# Patient Record
Sex: FEMALE | Race: WHITE | NOT HISPANIC OR LATINO | Employment: OTHER | URBAN - METROPOLITAN AREA
[De-identification: names, ages, dates, MRNs, and addresses within clinical notes are randomized per-mention and may not be internally consistent; named-entity substitution may affect disease eponyms.]

---

## 2017-02-06 ENCOUNTER — HOSPITAL ENCOUNTER (INPATIENT)
Facility: HOSPITAL | Age: 82
LOS: 2 days | Discharge: HOME HEALTH | DRG: 965 | End: 2017-02-09
Attending: EMERGENCY MEDICINE | Admitting: SURGERY
Payer: MEDICARE

## 2017-02-06 DIAGNOSIS — S42.034A CLOSED NONDISPLACED FRACTURE OF ACROMIAL END OF RIGHT CLAVICLE, INITIAL ENCOUNTER: ICD-10-CM

## 2017-02-06 DIAGNOSIS — S09.90XA HEAD TRAUMA: ICD-10-CM

## 2017-02-06 DIAGNOSIS — R53.81 DEBILITY: ICD-10-CM

## 2017-02-06 DIAGNOSIS — S09.90XA HEAD TRAUMA, INITIAL ENCOUNTER: ICD-10-CM

## 2017-02-06 DIAGNOSIS — S32.591A INFERIOR PUBIC RAMUS FRACTURE, RIGHT, CLOSED, INITIAL ENCOUNTER: ICD-10-CM
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
## 2017-02-06 DIAGNOSIS — S06.5XAA SUBDURAL HEMATOMA: Primary | ICD-10-CM

## 2017-02-06 DIAGNOSIS — S42.031A: ICD-10-CM

## 2017-02-06 PROCEDURE — 85025 COMPLETE CBC W/AUTO DIFF WBC: CPT

## 2017-02-06 PROCEDURE — 82550 ASSAY OF CK (CPK): CPT

## 2017-02-06 PROCEDURE — 80048 BASIC METABOLIC PNL TOTAL CA: CPT

## 2017-02-06 PROCEDURE — 85610 PROTHROMBIN TIME: CPT

## 2017-02-06 PROCEDURE — 84484 ASSAY OF TROPONIN QUANT: CPT

## 2017-02-06 NOTE — IP AVS SNAPSHOT
[facility]  [address]  [place] [address]  Phone: [phone]           Patient Discharge Instructions     Our goal is to set you up for success. This packet includes information on your condition, medications, and your home care. It will help you to care for yourself so you don't get sicker and need to go back to the hospital.     Please ask your nurse if you have any questions.        There are many details to remember when preparing to leave the hospital. Here is what you will need to do:    1. Take your medicine. If you are prescribed medications, review your Medication List in the following pages. You may have new medications to  at the pharmacy and others that you'll need to stop taking. Review the instructions for how and when to take your medications. Talk with your doctor or nurses if you are unsure of what to do.     2. Go to your follow-up appointments. Specific follow-up information is listed in the following pages. Your may be contacted by a transition nurse or clinical provider about future appointments. Be sure we have all of the phone numbers to reach you, if needed. Please contact your provider's office if you are unable to make an appointment.     3. Watch for warning signs. Your doctor or nurse will give you detailed warning signs to watch for and when to call for assistance. These instructions may also include educational information about your condition. If you experience any of warning signs to your health, call your doctor.               [facility]  Unless otherwise directed by your provider, please contact [facility], our nurse care line that is available for 24/7 assistance.     [phone] (toll-free)    Registered nurses in the [facility] provide clinical advisement, health education, appointment booking, and other advisory services.                    ** Verify the list of medication(s) below is accurate and up  to date. Carry this with you in case of emergency. If your medications have changed, please notify your healthcare provider.             Medication List      START taking these medications        Additional Info                      docusate sodium 50 MG capsule   Commonly known as:  COLACE   Refills:  0   Dose:  50 mg    Instructions:  Take 1 capsule (50 mg total) by mouth 2 (two) times daily.     Begin Date    AM    Noon    PM    Bedtime       tramadol 50 mg tablet   Commonly known as:  ULTRAM   Quantity:  40 tablet   Refills:  0   Dose:  50 mg    Instructions:  Take 1 tablet (50 mg total) by mouth every 6 (six) hours as needed for Pain.     Begin Date    AM    Noon    PM    Bedtime         CONTINUE taking these medications        Additional Info                      alendronate 70 MG tablet   Commonly known as:  FOSAMAX   Refills:  0      Begin Date    AM    Noon    PM    Bedtime       escitalopram oxalate 20 MG tablet   Commonly known as:  LEXAPRO   Quantity:  90 tablet   Refills:  3    Last time this was given:  20 mg on 2/9/2017  9:42 AM   Instructions:  TAKE 1 TABLET ONCE DAILY     Begin Date    AM    Noon    PM    Bedtime       lisinopril 20 MG tablet   Commonly known as:  PRINIVIL,ZESTRIL   Quantity:  90 tablet   Refills:  3    Last time this was given:  20 mg on 2/9/2017  9:42 AM   Instructions:  TAKE 1 TABLET DAILY FOR    HYPERTENSION     Begin Date    AM    Noon    PM    Bedtime       lovastatin 10 MG tablet   Commonly known as:  MEVACOR   Refills:  0      Begin Date    AM    Noon    PM    Bedtime            Where to Get Your Medications      You can get these medications from any pharmacy     Bring a paper prescription for each of these medications     tramadol 50 mg tablet         Information about where to get these medications is not yet available     ! Ask your nurse or doctor about these medications     docusate sodium 50 MG capsule                  Please bring to all follow up  appointments:    1. A copy of your discharge instructions.  2. All medicines you are currently taking in their original bottles.  3. Identification and insurance card.    Please arrive 15 minutes ahead of scheduled appointment time.    Please call 24 hours in advance if you must reschedule your appointment and/or time.        Your Scheduled Appointments     Feb 20, 2017  2:40 PM CST   New Patient with Red Griffith MD   [facility] ([facility])    [address] [address]   [phone]            Feb 22, 2017  8:00 AM CST   Ct Head Non Contrast with [facility] CT2 64- LIMIT 600 LBS   [facility] ([facility] )    [address] [facility] [id number] [phone]            Feb 22, 2017  9:40 AM CST   Established Patient Visit with LAURA Puga - [facility] ([facility] )    [address]  [place] [id number] [phone]            Feb 22, 2017  2:00 PM CST   New Problem with YUE Ludwig - Orthopedics ([facility] )    [address]  [place] [id number] [phone]              Follow-up Information     Follow up with Efra Fowler - [facility] On 2/22/2017.    Specialty:  Neurosurgery    Why:  Subdural hematoma follow-up scheduled on 2/22/17: 1. 08:00 AM Cat Scan Head on 2nd floor [facility], 2. 09:40 AM see Patrick SANCHEZ 7th floor.     Contact information:    [address] [facility] [id number] [phone]    Additional information:    7th Floor        Follow up with Rekha Gutierrez PA-C. Schedule an appointment as soon as possible for a visit in 2 weeks.    Specialty:  Orthopedic Surgery    Why:  Orthopedic follow-up ([facility] not go to [facility], however, if Son would like to go to their own Orthopedic Dr near home they may)/regarding right clavicle fracture, pelvic fracture: Office to call you w/appt.     Contact  "information:    [address]  [facility] [address]  [phone]          Follow up with Red Griffith MD In 2 weeks.    Specialty:  Family Medicine    Why:  Son requests for transfer to [facility] PCP, Dr. Red Griffith;Hospital follow-up appt scheduled on 2/20/17 at 2:40 PM.     Contact information:    [address] [facility] [address]  [phone]          Follow up with [facility]    Specialties:  Home Health Services, Physical Therapy, Occupational Therapy    Why:  Home health;they will contact you & initial visit is day after discharge    Contact information:    [address]  [address]  [facility] [address]  [phone]          Follow up with [facility]    Specialty:  DME Provider    Why:  DME: Wheelchair, bedside commode to be delivered to hospital room.    Contact information:    [address]  [address]  [facility] [address]  [phone]          Discharge Instructions     Future Orders    CT Head Without Contrast     Questions:    Reason for Exam:  fu CT head, right SDH.    May the Radiologist modify the order per protocol to meet the clinical needs of the patient?:  Yes    Call MD for:  difficulty breathing or increased cough     Call MD for:  persistent nausea and vomiting or diarrhea     Call MD for:  redness, tenderness, or signs of infection (pain, swelling, redness, odor or green/yellow discharge around incision site)     Call MD for:  severe uncontrolled pain     Call MD for:  temperature >100.4     COMMODE FOR HOME USE     Comments:    Discharging home today    Questions:    Type:  Standard    Height:  5' 8" (1.727 m)    Weight:  68 kg (150 lb)    Does patient have medical equipment at home?:   Comment - RW, shower chair    Length of need (1-99 months):  99    Vendor:  Other (use comments) Comment - [employer]    Expected Date of Delivery:  2/9/2017    Expected Time of Delivery:      Diet general     Questions:    Total calories:      Fat restriction, if " "any:      Protein restriction, if any:      Na restriction, if any:      Fluid restriction:      Additional restrictions:      Weight bearing restrictions (specify)     Comments:    NWB RUE and is to wear the sling for comfort  WBAT on LLE    WHEELCHAIR FOR HOME USE     Questions:    Hours in W/C per day:  8    Type of Wheelchair:  Standard    Size(Width):  18"(STD adult)    Leg Support:  STD footrests    Arm Height:      Desired seat depth:      Back height:      Lower leg length:      Actual seat depth:      Lap Belt:  Velcro    Accessories:      Cushion:  Basic    Justification for cushion:      Height:  5' 8" (1.727 m)    Weight:  68 kg (150 lb)    Does patient have medical equipment at home?:   Comment - RW, shower chair    Length of need (1-99 months):  99    Please check all that apply:  Caregiver is capable and willing to operate wheelchair safely.    The patient requires the use of a w/c for activities of daily living within the Home.    Vendor:  Other (use comments) Comment - [employer]     Expected Date of Delivery:  2/9/2017    Expected Time of Delivery:          Primary Diagnosis     Your primary diagnosis was:  Subdural Hematoma      Admission Information     Date & Time Provider Department CSN    2/6/2017 10:08 PM Jack Boles MD [facility] [id number]      Care Providers     Provider Role Specialty Primary office phone    Jack Boles MD Attending Provider General Surgery [phone]      Important Medicare Message          Most Recent Value    Important Message from Medicare Regarding Discharge Appeal Rights  Given to patient/caregiver, Explained to patient/caregiver, Signed/date by patient/caregiver yes 02/09/2017 1115      Your Vitals Were     BP Pulse Temp Resp Height Weight    143/65 (BP Location: Left arm, Patient Position: Sitting, BP Method: Automatic) 78 97.8 °F (36.6 °C) (Oral) 22 5' 8" (1.727 m) 68 kg (150 lb)    SpO2 BMI             96% 22.81 kg/m2    "      Recent Lab Values     No lab values to display.      Allergies as of 2/9/2017        Reactions    Penicillins       Advance Directives     An advance directive is a document which, in the event you are no longer able to make decisions for yourself, tells your healthcare team what kind of treatment you do or do not want to receive, or who you would like to make those decisions for you.  If you do not currently have an advance directive, [facility] encourages you to create one.  For more information call:  [phone]), [phone] ([phone]),  or log on to [email or web address].        Smoking Cessation     If you would like to quit smoking:   You may be eligible for free services if you are a [place] resident and started smoking cigarettes before September 1, 1988.  Call the Smoking Cessation Trust (SCT) toll free at (700) 565-7636 or (343) 407-6356.   Call 6-713-QUIT-NOW if you do not meet the above criteria.            Language Assistance Services     ATTENTION: Language assistance services are available, free of charge. Please call [phone].      ATENCIÓN: Si habla español, tiene a teran disposición servicios gratuitos de asistencia lingüística. Llame al [phone].     ISRRAEL Ý: N?u b?n nói Ti?ng Vi?t, có các d?ch v? h? tr? ngôn ng? mi?n phí dành cho b?n. G?i s? [phone].         [facility] complies with applicable Federal civil rights laws and does not discriminate on the basis of race, color, national origin, age, disability, or sex.

## 2017-02-07 PROBLEM — S42.031A: Status: ACTIVE | Noted: 2017-02-07

## 2017-02-07 PROBLEM — S06.5XAA SUBDURAL HEMATOMA: Status: ACTIVE | Noted: 2017-02-07

## 2017-02-07 PROBLEM — S42.033A: Status: ACTIVE | Noted: 2017-02-07

## 2017-02-07 LAB
ANION GAP SERPL CALC-SCNC: 9 MMOL/L
BASOPHILS # BLD AUTO: 0.02 K/UL
BASOPHILS NFR BLD: 0.2 %
BILIRUB UR QL STRIP: NEGATIVE
BUN SERPL-MCNC: 12 MG/DL
CALCIUM SERPL-MCNC: 9.2 MG/DL
CHLORIDE SERPL-SCNC: 101 MMOL/L
CK SERPL-CCNC: 335 U/L
CLARITY UR REFRACT.AUTO: CLEAR
CO2 SERPL-SCNC: 27 MMOL/L
COLOR UR AUTO: YELLOW
CREAT SERPL-MCNC: 0.9 MG/DL
DIFFERENTIAL METHOD: ABNORMAL
EOSINOPHIL # BLD AUTO: 0 K/UL
EOSINOPHIL NFR BLD: 0 %
ERYTHROCYTE [DISTWIDTH] IN BLOOD BY AUTOMATED COUNT: 14.2 %
EST. GFR  (AFRICAN AMERICAN): >60 ML/MIN/1.73 M^2
EST. GFR  (NON AFRICAN AMERICAN): 58 ML/MIN/1.73 M^2
GLUCOSE SERPL-MCNC: 115 MG/DL
GLUCOSE UR QL STRIP: NEGATIVE
HCT VFR BLD AUTO: 36.3 %
HGB BLD-MCNC: 12.3 G/DL
HGB UR QL STRIP: NEGATIVE
INR PPP: 1
KETONES UR QL STRIP: NEGATIVE
LEUKOCYTE ESTERASE UR QL STRIP: NEGATIVE
LYMPHOCYTES # BLD AUTO: 0.7 K/UL
LYMPHOCYTES NFR BLD: 5.5 %
MCH RBC QN AUTO: 31.6 PG
MCHC RBC AUTO-ENTMCNC: 33.9 %
MCV RBC AUTO: 93 FL
MONOCYTES # BLD AUTO: 1.1 K/UL
MONOCYTES NFR BLD: 8.2 %
NEUTROPHILS # BLD AUTO: 11.3 K/UL
NEUTROPHILS NFR BLD: 86.1 %
NITRITE UR QL STRIP: NEGATIVE
PH UR STRIP: 8 [PH] (ref 5–8)
PLATELET # BLD AUTO: 290 K/UL
PMV BLD AUTO: 10.5 FL
POCT GLUCOSE: 107 MG/DL (ref 70–110)
POTASSIUM SERPL-SCNC: 4.2 MMOL/L
PROT UR QL STRIP: NEGATIVE
PROTHROMBIN TIME: 10.6 SEC
RBC # BLD AUTO: 3.89 M/UL
SODIUM SERPL-SCNC: 137 MMOL/L
SP GR UR STRIP: 1.01 (ref 1–1.03)
TROPONIN I SERPL DL<=0.01 NG/ML-MCNC: 0.02 NG/ML
URN SPEC COLLECT METH UR: NORMAL
UROBILINOGEN UR STRIP-ACNC: NEGATIVE EU/DL
WBC # BLD AUTO: 13.15 K/UL

## 2017-02-07 PROCEDURE — 99291 CRITICAL CARE FIRST HOUR: CPT | Mod: ,,, | Performed by: EMERGENCY MEDICINE

## 2017-02-07 PROCEDURE — 25000003 PHARM REV CODE 250: Performed by: PHYSICIAN ASSISTANT

## 2017-02-07 PROCEDURE — 99223 1ST HOSP IP/OBS HIGH 75: CPT | Mod: ,,, | Performed by: SURGERY

## 2017-02-07 PROCEDURE — 94761 N-INVAS EAR/PLS OXIMETRY MLT: CPT

## 2017-02-07 PROCEDURE — 99291 CRITICAL CARE FIRST HOUR: CPT | Mod: 25

## 2017-02-07 PROCEDURE — 20000000 HC ICU ROOM

## 2017-02-07 PROCEDURE — 25000003 PHARM REV CODE 250: Performed by: SURGERY

## 2017-02-07 PROCEDURE — 99223 1ST HOSP IP/OBS HIGH 75: CPT | Mod: ,,, | Performed by: NEUROLOGICAL SURGERY

## 2017-02-07 PROCEDURE — 81003 URINALYSIS AUTO W/O SCOPE: CPT

## 2017-02-07 RX ORDER — SODIUM CHLORIDE 0.9 % (FLUSH) 0.9 %
3 SYRINGE (ML) INJECTION EVERY 8 HOURS
Status: DISCONTINUED | OUTPATIENT
Start: 2017-02-07 | End: 2017-02-10 | Stop reason: HOSPADM

## 2017-02-07 RX ORDER — ACETAMINOPHEN 325 MG/1
650 TABLET ORAL EVERY 8 HOURS PRN
Status: DISCONTINUED | OUTPATIENT
Start: 2017-02-07 | End: 2017-02-10 | Stop reason: HOSPADM

## 2017-02-07 RX ORDER — ONDANSETRON 8 MG/1
8 TABLET, ORALLY DISINTEGRATING ORAL EVERY 8 HOURS PRN
Status: DISCONTINUED | OUTPATIENT
Start: 2017-02-07 | End: 2017-02-10 | Stop reason: HOSPADM

## 2017-02-07 RX ORDER — HYDROCODONE BITARTRATE AND ACETAMINOPHEN 5; 325 MG/1; MG/1
1 TABLET ORAL EVERY 4 HOURS PRN
Status: DISCONTINUED | OUTPATIENT
Start: 2017-02-07 | End: 2017-02-10 | Stop reason: HOSPADM

## 2017-02-07 RX ORDER — SODIUM CHLORIDE 9 MG/ML
INJECTION, SOLUTION INTRAVENOUS CONTINUOUS
Status: DISCONTINUED | OUTPATIENT
Start: 2017-02-07 | End: 2017-02-08

## 2017-02-07 RX ORDER — ESCITALOPRAM OXALATE 20 MG/1
20 TABLET ORAL DAILY
Status: DISCONTINUED | OUTPATIENT
Start: 2017-02-07 | End: 2017-02-10 | Stop reason: HOSPADM

## 2017-02-07 RX ORDER — HEPARIN SODIUM 5000 [USP'U]/ML
5000 INJECTION, SOLUTION INTRAVENOUS; SUBCUTANEOUS EVERY 8 HOURS
Status: DISCONTINUED | OUTPATIENT
Start: 2017-02-07 | End: 2017-02-10 | Stop reason: HOSPADM

## 2017-02-07 RX ADMIN — Medication 3 ML: at 10:02

## 2017-02-07 RX ADMIN — Medication 3 ML: at 02:02

## 2017-02-07 RX ADMIN — HEPARIN SODIUM 5000 UNITS: 5000 INJECTION, SOLUTION INTRAVENOUS; SUBCUTANEOUS at 10:02

## 2017-02-07 RX ADMIN — Medication 3 ML: at 08:02

## 2017-02-07 RX ADMIN — HEPARIN SODIUM 5000 UNITS: 5000 INJECTION, SOLUTION INTRAVENOUS; SUBCUTANEOUS at 02:02

## 2017-02-07 RX ADMIN — SODIUM CHLORIDE: 0.9 INJECTION, SOLUTION INTRAVENOUS at 08:02

## 2017-02-07 NOTE — PROGRESS NOTES
Paged on call for Dr. Boles. Dr. Sibley on call, asked him if pt still needed to be NPO. Informed him that pt is cleared from Ortho standpoint (no surgical intervention). Dr. Sibley states he would like to read CT of head and then will determine if pt can eat or not. Will inform pt and cont to monitor.

## 2017-02-07 NOTE — ED NOTES
Patient identifiers verified and correct for Oksana Seeling Finger.    LOC: The patient is awake, alert and aware of environment with an appropriate affect, the patient is oriented x 3 and speaking appropriately.  APPEARANCE: Patient resting comfortably and in no acute distress, patient is clean and well groomed, patient's clothing is properly fastened.  SKIN: The skin is warm and dry, color consistent with ethnicity, patient has normal skin turgor and moist mucus membranes, skin intact, no breakdown noted. Bruising to right shoulder, pt has scaling skin to bilateral lower extremities  MUSCULOSKELETAL: Patient moving all extremities spontaneously, no obvious swelling or deformities noted.  RESPIRATORY: Airway is open and patent, respirations are spontaneous, patient has a normal effort and rate, no accessory muscle use noted, bilateral breath sounds even and clear.  CARDIAC: Patient has a normal rate and regular rhythm, no periphreal edema noted, capillary refill < 3 seconds.  ABDOMEN: Soft and non tender to palpation, no distention noted, normoactive bowel sounds present in all four quadrants.  NEUROLOGIC: Pupils equal bilaterally, eyes open spontaneously, behavior appropriate to situation, follows commands, facial expression symmetrical, bilateral hand grasp equal and even, purposeful motor response noted, normal sensation in all extremities when touched with a finger.

## 2017-02-07 NOTE — ED TRIAGE NOTES
Oksana Estrada, a 86 y.o. female presents to the ED complaining that she fell but does not remember when. Pt complaining of a headache and right shoulder pain. Pt denies chest pain, n/v/d, fever      Chief Complaint   Patient presents with    Fall     ems reports the pt c/o falling today at home. Pt states she fall around noon and just pressed her life alert button tonight. Pt denies hitting head and has pain and bruising to her right shoulder     Review of patient's allergies indicates:   Allergen Reactions    Penicillins      Past Medical History   Diagnosis Date    Dementia     Depression     Hypertension     Stroke

## 2017-02-07 NOTE — ED PROVIDER NOTES
"Encounter Date: 2/6/2017    SCRIBE #1 NOTE: I, Octavio-Nusrat Selam , am scribing for, and in the presence of,  Demetrius Stallworth MD. I have scribed the following portions of the note - Other sections scribed: HPI/ROS .       History     Chief Complaint   Patient presents with    Fall     ems reports the pt c/o falling today at home. Pt states she fall around noon and just pressed her life alert button tonight. Pt denies hitting head and has pain and bruising to her right shoulder     Review of patient's allergies indicates:   Allergen Reactions    Penicillins      HPI Comments: CC: Fall     HPI: This 86 y.o. female with HTN, dementia, and hx of CVA presents to the ED via EMS, accompanied by her son, for evaluation of a suspected fall that occurred today. Pt reports bruising and moderate pain (4/10) with movement of her R shoulder that began today. Pt's son states the pt pressed her life alert button around 7 PM tonight, resulting her being found on the floor of her bathroom by EMS. He reports she is normally ambulatory at home with the assistance of a walker, but has not walked since EMS picked her up from home. Pt's son states his sister called the pt around 2 or 3 PM today, but the pt did not answer. Pt initially repeatedly denied a fall tonight stating, "I don't think I fell down." However, she later stated, "I was laying on the cold floor for hours." She does not recall hitting her head or LOC. Pt's son notes she is currently at normal mental baseline, stating, "Her not remembering anything is normal," but that her memory  "comes in and out." Pt states she is not on blood thinners.Pt denies hip pain and CP. Hx is otherwise limited.       The history is provided by the patient and a relative (son). History limited by: dementia  No  was used.     Past Medical History   Diagnosis Date    Dementia     Depression     Hypertension     Stroke      No past medical history pertinent " negatives.  Past Surgical History   Procedure Laterality Date    Breast surgery       History reviewed. No pertinent family history.  Social History   Substance Use Topics    Smoking status: Former Smoker     Types: Cigarettes    Smokeless tobacco: None      Comment: quit 25 years ago    Alcohol use No     Review of Systems   Unable to perform ROS: Dementia   Constitutional: Negative for fever.   HENT: Negative for sore throat.    Respiratory: Negative for shortness of breath.    Cardiovascular: Negative for chest pain.   Gastrointestinal: Negative for nausea.   Genitourinary: Negative for dysuria.   Musculoskeletal: Positive for arthralgias (R shoulder ). Negative for back pain and neck pain.   Skin: Positive for color change (bruising to the R shoulder ). Negative for rash.   Neurological: Negative for weakness and headaches.   Hematological: Does not bruise/bleed easily.       Physical Exam   Initial Vitals   BP Pulse Resp Temp SpO2   02/06/17 2120 02/06/17 2120 02/06/17 2120 02/06/17 2120 02/06/17 2120   151/103 86 18 98.1 °F (36.7 °C) 100 %     Physical Exam    Vitals reviewed.  Constitutional: She appears well-developed and well-nourished.   HENT:   Head: Normocephalic and atraumatic.   Nose: Nose normal.   Mouth/Throat: No oropharyngeal exudate.   Eyes: EOM are normal. Pupils are equal, round, and reactive to light.   Neck: Normal range of motion. Neck supple. No JVD present.   Cardiovascular: Regular rhythm and normal heart sounds. Exam reveals no gallop and no friction rub.    No murmur heard.  Pulmonary/Chest: Breath sounds normal. No stridor. No respiratory distress. She has no wheezes. She has no rhonchi. She has no rales. She exhibits no tenderness.   Abdominal: Soft. Bowel sounds are normal. She exhibits no distension and no mass. There is no tenderness. There is no rebound and no guarding.   Musculoskeletal: Normal range of motion. She exhibits no edema.        Right shoulder: She exhibits  tenderness, bony tenderness and swelling.        Right hip: She exhibits tenderness. She exhibits normal range of motion, normal strength, no swelling, no crepitus and no deformity.        Cervical back: Normal.        Thoracic back: Normal.        Lumbar back: Normal.        Right upper arm: Normal.        Left upper arm: Normal.        Arms:       Right hand: Normal.        Left hand: Normal.        Right upper leg: Normal.        Left upper leg: Normal.        Right lower leg: Normal.        Left lower leg: Normal.   Neurological: She is alert and oriented to person, place, and time. She has normal strength. No cranial nerve deficit or sensory deficit. Coordination normal. GCS eye subscore is 4. GCS verbal subscore is 5. GCS motor subscore is 6.   5 x 5 strength in all extremities.  Strong  strength bilaterally and strong dorsi flexion laterally.  No cranial nerve deficits appreciated   Skin: Skin is warm and dry.   Psychiatric: She has a normal mood and affect. Thought content normal.         ED Course   Critical Care  Date/Time: 2/7/2017 12:23 AM  Performed by: RAKESH COTE  Authorized by: RAKESH COTE   Total critical care time (exclusive of procedural time) : 44 minutes  Critical care was necessary to treat or prevent imminent or life-threatening deterioration of the following conditions: CNS failure or compromise.  Critical care was time spent personally by me on the following activities: discussions with consultants, examination of patient, obtaining history from patient or surrogate, ordering and performing treatments and interventions, ordering and review of laboratory studies, ordering and review of radiographic studies, pulse oximetry, re-evaluation of patient's condition and review of old charts.        Labs Reviewed   CBC W/ AUTO DIFFERENTIAL - Abnormal; Notable for the following:        Result Value    WBC 13.15 (*)     RBC 3.89 (*)     Hematocrit 36.3 (*)     MCH 31.6 (*)      Gran # 11.3 (*)     Lymph # 0.7 (*)     Mono # 1.1 (*)     Gran% 86.1 (*)     Lymph% 5.5 (*)     All other components within normal limits   BASIC METABOLIC PANEL   CK   TROPONIN I   PROTIME-INR          X-Rays:   Independently Interpreted Readings:   Chest X-Ray: Normal heart size.  No infiltrates.  No acute abnormalities. There is a fracture of the right clavicle. No pneumothorax     Medical Decision Making:   History:   Old Medical Records: I decided to obtain old medical records.  Independently Interpreted Test(s):   I have ordered and independently interpreted X-rays - see prior notes.  I have ordered and independently interpreted EKG Reading(s) - see prior notes  Clinical Tests:   Lab Tests: Ordered and Reviewed  Radiological Study: Ordered and Reviewed  Medical Tests: Reviewed and Ordered      Medical decision-making:    The patient received a medical screening exam. If performed, the EKG was independently evaluated by me and is pending final cardiology evaluation.  If performed, all radiographic studies were independently evaluated by me and are pending final radiology evaluation. If labs were ordered, they were reviewed. Vital signs are independently assessed by me.  If performed, the pulse oximetry was independently evaluated by me.  I decided to obtain the patient's past medical record.  If available, I reviewed the patient's past medical record, including most recent labs and radiology reports.    Patient presents via EMS for evaluation after falling in her home today.  Patient is a poor historian and does not remember falling.  Unclear of the etiology.  In total duration of downtime.  Reportedly, she pushed her medical alert button around 7 PM.  EMS arrived and found the patient on her bathroom floor.  EKG does not show any evidence of cardiac ischemia.  Patient denies chest pain.  Troponin is pending.  Metabolic profile is pending.  Concern for rhabdomyolysis.  CPK pending.    Patient had no visible  sign of head trauma.  Patient's family at bedside states that she has baseline memory problems and he would not be uncommon for her not to remember falling.  She is moving all extremities and shows no focal neurological deficits.  A CT scan of the head was performed and shows a right-sided subdural hematoma.  Patient will be transferred to [facility] main [facility] for immediate neurosurgical consultation.    Chest x-ray was performed and shows a right clavicular fracture.  There is no sign of pneumothorax or focal infiltration to suggest pneumonia.  X-ray of the pelvis shows an inferior pubic rami fracture on the right.  This is a closed fracture.  Patient was only minimally tender on exam.    There were no appreciable neurovascular deficits at the time of evaluation.    Transfer center was called and the patient was immediately transferred to [facility].    All the above findings were discussed at length with the patient's son who was at bedside.    EN Stallworth M.D. 12:29 AM 2/7/2017         Scribe Attestation:   Scribe #1: I performed the above scribed service and the documentation accurately describes the services I performed. I attest to the accuracy of the note.    Attending Attestation:           Physician Attestation for Scribe:  Physician Attestation Statement for Scribe #1: I, Demetrius Stallworth MD, reviewed documentation, as scribed by Caroline Doss  in my presence, and it is both accurate and complete.                 ED Course     Clinical Impression:   The primary encounter diagnosis was Subdural hematoma. Diagnoses of Fall, Closed nondisplaced fracture of acromial end of right clavicle, initial encounter, and Inferior pubic ramus fracture, right, closed, initial encounter were also pertinent to this visit.          Demetrius Stallworth MD  02/07/17 0029

## 2017-02-07 NOTE — CONSULTS
Consult Note  General Surgery    Consult Requested By: ED  Reason for Consult: fall/polytrauma    SUBJECTIVE:     History of Present Illness:  Patient is a 86 y.o. female with PMH of CVA and dementia, who lives alone at baseline, she sustained a fall from standing at home around noon today, some time after this she pressed her life alert button. She was taken to an outside facility where work-up revealed an acute SDH, right clavicle fracture and pubic ramus fracture. She has been evaluated by neurosurgery and no acute intervention is necessary and she will be receiving a repeat CT scan to assess for stability. She reports some pain in right shoulder and decreased range of motion, but no other complaints.     Scheduled Meds:   escitalopram oxalate  20 mg Oral Daily    sodium chloride 0.9%  3 mL Intravenous Q8H     Continuous Infusions:   sodium chloride 0.9%       PRN Meds:acetaminophen, hydrocodone-acetaminophen 5-325mg, ondansetron    Review of patient's allergies indicates:   Allergen Reactions    Penicillins        Past Medical History   Diagnosis Date    Dementia     Depression     Hypertension     Stroke      Past Surgical History   Procedure Laterality Date    Breast surgery       History reviewed. No pertinent family history.  Social History   Substance Use Topics    Smoking status: Former Smoker     Types: Cigarettes    Smokeless tobacco: None      Comment: quit 25 years ago    Alcohol use No        Review of Systems:  Constitutional: no fever or chills, pain well controlled  Eyes: no visual changes  ENT: no nasal congestion or sore throat  Respiratory: no cough or shortness of breath  Cardiovascular: no chest pain or palpitations  Gastrointestinal: no nausea or vomiting, tolerating diet  Genitourinary: no hematuria or dysuria  Musculoskeletal: positive for bone pain and right shoulder    OBJECTIVE:     Vital Signs (Most Recent)  Temp: 98.2 °F (36.8 °C) (02/07/17 0020)  Pulse: 92 (02/07/17  0020)  Resp: 18 (02/07/17 0020)  BP: (!) 179/72 (02/07/17 0020)  SpO2: 96 % (02/07/17 0014)    Vital Signs Range (Last 24H):  Temp:  [98.1 °F (36.7 °C)-98.2 °F (36.8 °C)]   Pulse:  [64-92]   Resp:  [18]   BP: (151-181)/()   SpO2:  [96 %-100 %]     Physical Exam:  Awake and alert, cooperative  Stable midface, no ecchymosis  No posterior neck tenderness  Chest wall stable no crepitus  CTAB, RRR  Right shoulder tender to palpation with bruising, left shoulder normal  Abdomen soft and non tender, no distension  Extremities warm well perfused, no tenderness or range of motion isues  neurologically and strength intact    Laboratory:  CBC:   Recent Labs  Lab 02/06/17  2351   WBC 13.15*   RBC 3.89*   HGB 12.3   HCT 36.3*        CMP:   Recent Labs  Lab 02/06/17  2351   *   CALCIUM 9.2      K 4.2   CO2 27      BUN 12   CREATININE 0.9     Coagulation:   Recent Labs  Lab 02/06/17  2351   INR 1.0       Diagnostic Results:  X-Ray: Reviewed  CT: Reviewed    ASSESSMENT/PLAN:     86 year old female s/p fall with small SDH, and clavicle fracture, possible pubic ramus fracture    Neurosurgery following along, adamant that the patient does not need ICU admission  Will repeat CT scan to assess for stability, appreciate their input   Consult orthopedics for recommendations regarding bone fractures  Repeat labs in am  Gentle hydration  Pain control  Npo until consulting services assure no operative interventions  Likely able to be discharged home pending stable imaging and orthopedics evaluation of fractures    I have personally performed a detailed history and physical examination on this patient. My findings are summarized in the resident's note included in the record.

## 2017-02-07 NOTE — ED NOTES
[facility] spoke with Isabella neurosurgeon ; Ramana general surgeon; Kye CAMPO MD ; [phone] number to call report; Link on the way.

## 2017-02-07 NOTE — ED TRIAGE NOTES
Pt reports to ED via EMS after having a fall at home and pressing her life alert button; pt pressed her life alert button around 7pm but does not remember falling or what time she fell; pt has hx of dementia and is currently AAOx4; pt has bruising to R shoulder and pain 5/10 to area; pt's son at bedside; will continue to monitor.

## 2017-02-07 NOTE — PLAN OF CARE
[facility]    HOME HEALTH ORDERS  FACE TO FACE ENCOUNTER    Patient Name: Oksana Estrada  YOB: 1930    PCP: Kaelyn Rae MD   PCP Address: [address] [facility] [place] [address]  PCP Phone Number: [phone]  PCP Fax: [phone]    Discharging Team(s): Data Unavailable    Encounter Date: 02/07/2017    Admit to Home Health    Diagnoses:  Active Hospital Problems    Diagnosis  POA    *Subdural hematoma [I62.00]  Yes    Closed traumatic minimally displaced fracture of acromial end of right clavicle [S42.031A]  Unknown      Resolved Hospital Problems    Diagnosis Date Resolved POA   No resolved problems to display.       No future appointments.  Follow-up Information     Follow up with Efra Fowler - Neurosurgery 7th Fl.    Specialty:  Neurosurgery    Contact information:    [address] [facility] [address] [phone]    Additional information:    7th Floor        Follow up with Efra Fowler - Orthopedics.    Specialty:  Orthopedics    Contact information:    [address] [facility] [address] [phone]    Additional information:    [facility] 5th Floor        Follow up with Kaelyn Rae MD.    Specialty:  Internal Medicine    Contact information:    [address] [facility]  [place] [address] [phone]              I have seen and examined this patient face to face today. My clinical findings that support the need for the home health skilled services and home bound status are the following:  Medical restrictions requiring assistance of another human to leave home due to  Unstable ambulation.    Allergies:  Review of patient's allergies indicates:   Allergen Reactions    Penicillins        Diet: regular diet    Activities: activity as tolerated; Sling for comfort RUE;  - NWB RUE  - FWB LLE    Nursing:   SN to complete comprehensive assessment including routine vital signs.  Instruct on disease process and s/s of complications to report to MD. Review/verify medication list sent home with the patient at time of discharge  and instruct patient/caregiver as needed. Frequency may be adjusted depending on start of care date.    Notify MD if SBP > 160 or < 90; DBP > 90 or < 50; HR > 120 or < 50; Temp > 101        CONSULTS:    Physical Therapy to evaluate and treat. Evaluate for home safety and equipment needs; Establish/upgrade home exercise program. Perform / instruct on therapeutic exercises, gait training, transfer training, and Range of Motion.  Occupational Therapy to evaluate and treat. Evaluate home environment for safety and equipment needs. Perform/Instruct on transfers, ADL training, ROM, and therapeutic exercises.  Aide to provide assistance with personal care, ADLs, and vital signs.      Medications: Review discharge medications with patient and family and provide education.      Current Discharge Medication List      CONTINUE these medications which have NOT CHANGED    Details   lisinopril (PRINIVIL,ZESTRIL) 20 MG tablet TAKE 1 TABLET DAILY FOR    HYPERTENSION  Qty: 90 tablet, Refills: 3      alendronate (FOSAMAX) 70 MG tablet       escitalopram oxalate (LEXAPRO) 20 MG tablet TAKE 1 TABLET ONCE DAILY  Qty: 90 tablet, Refills: 3      lovastatin (MEVACOR) 10 MG tablet              I certify that this patient is confined to her home and needs intermittent skilled nursing care, physical therapy and occupational therapy.

## 2017-02-07 NOTE — CONSULTS
Consult Note  Neurosurgery    Consult Requested By: ED  Reason for Consult: SDH    SUBJECTIVE:     History of Present Illness:  Patient is a 86 y.o. female with pmhx of dementia s/p CVA presents s/p fall at home.  Pt doesn't recall event.  CT head at OSH showed right SDH and xrays showed left inferior pubic ramus fracture and right clavicle fracture.  Pt denies any HA currently, n/v, focal numbness, weakness.  She takes no blood thinners.    Scheduled Meds:  Continuous Infusions:  PRN Meds:    Review of patient's allergies indicates:   Allergen Reactions    Penicillins        Past Medical History   Diagnosis Date    Dementia     Depression     Hypertension     Stroke      Past Surgical History   Procedure Laterality Date    Breast surgery       History reviewed. No pertinent family history.  Social History   Substance Use Topics    Smoking status: Former Smoker     Types: Cigarettes    Smokeless tobacco: None      Comment: quit 25 years ago    Alcohol use No        Review of Systems:  See HPI    OBJECTIVE:     Vital Signs (Most Recent)  Temp: 98.2 °F (36.8 °C) (02/07/17 0020)  Pulse: 92 (02/07/17 0020)  Resp: 18 (02/07/17 0020)  BP: (!) 179/72 (02/07/17 0020)  SpO2: 96 % (02/07/17 0014)    Vital Signs Range (Last 24H):  Temp:  [98.1 °F (36.7 °C)-98.2 °F (36.8 °C)]   Pulse:  [64-92]   Resp:  [18]   BP: (151-181)/()   SpO2:  [96 %-100 %]     Physical Exam:  Awake, alert, oriented to person/place  PERRL, EOMI, face symm, tongue midline  ROD symmetrically  SILT  No drift  Visual fields grossly intact  Mild paraspinal tenderness to palpation    Laboratory:  CBC:   Recent Labs  Lab 02/06/17  2351   WBC 13.15*   RBC 3.89*   HGB 12.3   HCT 36.3*      MCV 93   MCH 31.6*   MCHC 33.9     CMP:   Recent Labs  Lab 02/06/17  2351   *   CALCIUM 9.2      K 4.2   CO2 27      BUN 12   CREATININE 0.9     Coagulation:   Recent Labs  Lab 02/06/17 2351   INR 1.0     Cardiac markers:   Recent  Labs  Lab 02/06/17  2351   TROPONINI 0.020     Specimen     None            Diagnostic Results:  CT head: small right convexity SDH.  No midline shift.    ASSESSMENT/PLAN:     86 F with pmhx of dementia presents s/p fall from standing with right SDH, right clavicle frx, left inf pubic ramus frx  -Pt neuro stable  -Rec admission to gen surgery for polytrauma  -CT c spine  -Fu repeat CT head  -Hold all anticoagulation  -Fu gen surgery recs for extremity fractures.  -Syncope mcguire per primary team.

## 2017-02-07 NOTE — NURSING
Report received from JARROD Medley prior to transfer. Pt admitted to SICU 6079 from Floor 5. Upon arrival, connected to continuous ECG and SpO2 monitoring.  AAOx4 With intermittent confusion. Pupils equal and reactive. Denies HA or blurry vision. Moves all extremities. RUE in sling for comfort; bruising noted.   Unlabored respirations with equal rise/fall of chest. Neurovascular checks Q1. Vital signs stable and patient SpO2 95-97% on RA. Remains NPO at this time.  Skin intact on sacrum, buttocks, posterior of head, and heels. Safety measures maintained.

## 2017-02-07 NOTE — PLAN OF CARE
"Patient lives alone in a 1 story house. Not medically stable for discharge;Consults followin. Neurosurgery, 2. Orthopedics. See below. Updated ALMA Olmedo. MICHAEL will continue to follow.     CM dept out of [facility].        17 1330   Discharge Assessment   Assessment Type Discharge Planning Assessment   Confirmed/corrected address and phone number on facesheet? Yes   Assessment information obtained from? Patient;Medical Record   Expected Length of Stay (days) (2+)   Communicated expected length of stay with patient/caregiver yes   Type of Healthcare Directive Received (Unknown)   Prior to hospitilization cognitive status: Alert/Oriented;No Deficits   Prior to hospitalization functional status: Independent;Assistive Equipment   Current cognitive status: Alert/Oriented;No Deficits   Current Functional Status: Independent;Needs Assistance;Partially Dependent  (Not up out of bed yet;TBD)   Arrived From home or self-care  (Via ER)   Lives With alone   Able to Return to Prior Arrangements unable to determine at this time (comments)  (PT/OT recs pending)   Is patient able to care for self after discharge? Unable to determine at this time (comments)   How many people do you have in your home that can help with your care after discharge? 0   Who are your caregiver(s) and their phone number(s)? (Does not live w/her: Son: Michele BAEZ [phone]. Has ladyKiana, pays her to come help her 3 hours 2 times a week. )   Patient's perception of discharge disposition home or selfcare;home health;other (comments)  (Patient states,"I wish I could get HH. I will take 1st available HH Agency." MICHAEL informed her: PT/OT will work w/her, give recommendations for discharge plan. HELENA MARQUEZ, will  make arrangements accordingly after she visits her about her decision . )   Readmission Within The Last 30 Days no previous admission in last 30 days   Patient currently being followed by outpatient case management? " No   Patient currently receives home health services? No   Does the patient currently use HME? Yes   Name and contact number for HME provider: (Unknown)   Patient currently receives private duty nursing? N/A   Patient currently receives any other outside agency services? No   Equipment Currently Used at Home walker, rolling;shower chair;cane, straight  (Not using straight cane. )   Do you have any problems affording any of your prescribed medications? No   Is the patient taking medications as prescribed? yes   Do you have any financial concerns preventing you from receiving the healthcare you need? No   Does the patient have transportation to healthcare appointments? Yes   Transportation Available car;family or friend will provide   On Dialysis? No   Does the patient receive services at the [facility]? No   Discharge Plan A Home;Home Health  (TBD/Per PT/OT recs. )   Discharge Plan B Skilled Nursing Facility;Rehab  (as above)   Patient/Family In Agreement With Plan unable to assess

## 2017-02-07 NOTE — CONSULTS
Consult Note  Orthopedic Surgery      SUBJECTIVE:     History of Present Illness:  Oksana Estrada is a 86 y.o. left hand dominant female with dementia and prior CVA who presents with right shoulder pain after after fall yesterday at home from standing height. Does not recall events. Used life alert to notify emergency services. Presented to outside ED, where head CT demonstrated right subdural hematoma. Patient was subsequently transferred here. Denies blood thinners. Lives alone. Ambulates at baseline with walker. 12 years ago had pelvic fracture treated nonoperatively. Denies any other MSK pain.    Past Medical History   Diagnosis Date    Dementia     Depression     Hypertension     Stroke        Past Surgical History   Procedure Laterality Date    Breast surgery         History reviewed. No pertinent family history.    Social History     Social History    Marital status:      Spouse name: N/A    Number of children: N/A    Years of education: N/A     Social History Main Topics    Smoking status: Former Smoker     Types: Cigarettes    Smokeless tobacco: None      Comment: quit 25 years ago    Alcohol use No    Drug use: No    Sexual activity: No     Other Topics Concern    None     Social History Narrative       Current Facility-Administered Medications   Medication Dose Route Frequency Provider Last Rate Last Dose    0.9%  NaCl infusion   Intravenous Continuous Denis Johnson MD        acetaminophen tablet 650 mg  650 mg Oral Q8H PRN Denis Johnson MD        escitalopram oxalate tablet 20 mg  20 mg Oral Daily Denis Johnson MD        hydrocodone-acetaminophen 5-325mg per tablet 1 tablet  1 tablet Oral Q4H PRN Denis Johnson MD        ondansetron disintegrating tablet 8 mg  8 mg Oral Q8H PRN Denis Johnson MD        sodium chloride 0.9% flush 3 mL  3 mL Intravenous Q8H Denis Johnson MD         Current Outpatient Prescriptions   Medication Sig Dispense Refill    lisinopril (PRINIVIL,ZESTRIL) 20 MG  tablet TAKE 1 TABLET DAILY FOR    HYPERTENSION 90 tablet 3    alendronate (FOSAMAX) 70 MG tablet       escitalopram oxalate (LEXAPRO) 20 MG tablet TAKE 1 TABLET ONCE DAILY 90 tablet 3    lovastatin (MEVACOR) 10 MG tablet          Review of patient's allergies indicates:   Allergen Reactions    Penicillins          Review of Systems:  ROS: Patient denies constitutional symptoms, cardiac symptoms, respiratory symptoms, GI symptoms. The remainder of the musculoskeletal ROS is included in the HPI.      OBJECTIVE:     Vital Signs (Most Recent)  Vitals:    02/07/17 0531 02/07/17 0532 02/07/17 0601 02/07/17 0631   BP:  (!) 156/76 (!) 140/64 135/61   BP Location:       Patient Position:       Pulse: 81  66 63   Resp:   15 19   Temp:       TempSrc:       SpO2: 95%  96% 95%   Weight:       Height:             Physical Exam:  Constitutional:  NAD; well-developed and well-nourished  Pulmonary/Chest: Effort normal  Skin: Warm and dry  Neuro: Alert and oriented to person, place, and time; no focal numbness or weakness  RUE: 4 inch x 6 inch area of ecchymosis over superior shoulder/trapezius region, tenderness to palpation over distal clavicle, some pain with ROM of shoulder, no pain with ROM of elbow, wrist, or fingers; M,R,U, AIN, PIN, and axillary nerve tested in isolation and no deficits noted, brisk capillary refill, 2+ radial pulse  BLE: skin intact, painless full active ROM of hip, knee, ankle and foot, NVI, no tenderness to palpation    Diagnostic Results:  X-Ray: Reviewed left distal clavicle fracture, shoulder reduced  CT pelvis reviewed: remote left inferior pubic ramus fracture, well healed, no acute fracture    ASSESSMENT/PLAN:     86 y.o. female with right distal clavicle fracture and remote left inferior pubic ramus fracture  - Sling for comfort RUE  - NWB RUE  - FWB LLE  - PT/OT for distal clavicle fracture, gentle passive ROM  - Please call with questions    Wallace Malave MD PGY-1  Orthopedic  Surgery      Attg Note:  Patient seen and examined.  I agree with the above assessment and plan.   TTP and ecchymosis.  Minimally displaced right distal clavicle fracture at AC joint.  No elevation of medial fragment.  Intact CC ligaments.  Early ROM.  Use of arm for ADLs ok.      Brady Chapin MD

## 2017-02-07 NOTE — PROGRESS NOTES
Progress Note  ACS    Admit Date: 2/6/2017  Attending: Ramana  S/P: * No surgery found *    Post-operative Day:      Hospital Day: 2    SUBJECTIVE:     Pt resting comfortably. No focal deficits.  No headache.  No belly pain.     OBJECTIVE:     Vital Signs (Most Recent)  Temp: 98.2 °F (36.8 °C) (02/07/17 0020)  Pulse: 63 (02/07/17 0631)  Resp: 19 (02/07/17 0631)  BP: 135/61 (02/07/17 0631)  SpO2: 95 % (02/07/17 0631)    Vital Signs Range (Last 24H):  Temp:  [98.1 °F (36.7 °C)-98.2 °F (36.8 °C)]   Pulse:  [63-92]   Resp:  [15-19]   BP: (135-181)/()   SpO2:  [95 %-100 %]     I & O (Last 24H):  Intake/Output Summary (Last 24 hours) at 02/07/17 0820  Last data filed at 02/07/17 0555   Gross per 24 hour   Intake                0 ml   Output              700 ml   Net             -700 ml     Physical Exam:  General: well developed, well nourished, no distress  Lungs:  clear to auscultation bilaterally and normal respiratory effort  Heart: regular rate and rhythm  Abdomen: soft, non-tender non-distented; bowel sounds normal; no masses,  no organomegaly  Neuro:  ROD    Wound/Incision:  clean, dry, intact    Laboratory:  CBC:   Recent Labs  Lab 02/06/17  2351   WBC 13.15*   RBC 3.89*   HGB 12.3   HCT 36.3*      MCV 93   MCH 31.6*   MCHC 33.9     CMP:   Recent Labs  Lab 02/06/17  2351   *   CALCIUM 9.2      K 4.2   CO2 27      BUN 12   CREATININE 0.9     Coagulation:   Recent Labs  Lab 02/06/17 2351   INR 1.0     Labs within the past 24 hours have been reviewed.    ASSESSMENT/PLAN:     Assessment: 87 y/o female s/p fall with acute right SDH and right clavicle fracture, possible pelvic fracture    Plan:   Will admit to ICU for q1h neuro checks  F/u repeat Head CT  F/u ortho recs  Hold anticoagulation until repeat CT back and per NSG recs  Likely start diet later today    Carlos Winters MD  General Surgery, PGY-V  [phone]

## 2017-02-07 NOTE — ED PROVIDER NOTES
Encounter Date: 2/6/2017    SCRIBE #1 NOTE: I, Dionicio Sullivan, am scribing for, and in the presence of,  Dr. Fishman. I have scribed the entire note.       History     Chief Complaint   Patient presents with    Fall     ems reports the pt c/o falling today at home. Pt states she fall around noon and just pressed her life alert button tonight. Pt denies hitting head and has pain and bruising to her right shoulder     Review of patient's allergies indicates:   Allergen Reactions    Penicillins      HPI Comments: Time seen by provider: 3:25 AM    This is a 86 y.o. female with active co-morbidities of dementia and CVA status post a fall today at home suspected around noon and she was unable to get up. The patient presented to the outside facility and had a workup there including a Head CT which showed an acute right subdural with no midline shift or mass effect. She had a right clavicle fracture and left pubic ramus fracture. Neurosurgery was consulted and advised transfer.     On evaluation, the patient does not remember falling. She states that she is tired, but denies any C-spine pain.       The history is provided by medical records.     Past Medical History   Diagnosis Date    Dementia     Depression     Hypertension     Stroke      No past medical history pertinent negatives.  Past Surgical History   Procedure Laterality Date    Breast surgery       History reviewed. No pertinent family history.  Social History   Substance Use Topics    Smoking status: Former Smoker     Types: Cigarettes    Smokeless tobacco: None      Comment: quit 25 years ago    Alcohol use No     Review of Systems   Constitutional: Negative for fever.   HENT: Negative for sore throat.    Respiratory: Negative for shortness of breath.    Cardiovascular: Negative for chest pain.   Gastrointestinal: Negative for nausea.   Genitourinary: Negative for dysuria.   Musculoskeletal: Negative for back pain.        Clavicle and pubic ramus  fracture.   Skin: Negative for rash.   Neurological: Negative for weakness.        Subdural hematoma.   Hematological: Does not bruise/bleed easily.       Physical Exam   Initial Vitals   BP Pulse Resp Temp SpO2   02/06/17 2120 02/06/17 2120 02/06/17 2120 02/06/17 2120 02/06/17 2120   151/103 86 18 98.1 °F (36.7 °C) 100 %     Physical Exam    Nursing note and vitals reviewed.  Constitutional: She appears well-developed and well-nourished. She is not diaphoretic. No distress.   HENT:   Head: Normocephalic and atraumatic.   Mouth/Throat: Oropharynx is clear and moist.   No signs of head trauma.   Eyes: Conjunctivae and EOM are normal. Pupils are equal, round, and reactive to light.   Neck: Normal range of motion. Neck supple.   Right lateral clavicle tenderness with diffuse ecchymosis, but no pain to the shoulder with ROM.   Cardiovascular: Normal rate and regular rhythm.   No murmur heard.  Pulmonary/Chest: Breath sounds normal. No respiratory distress.   Abdominal: Soft. She exhibits distension. There is tenderness. There is no guarding.   Some lower abdominal distension with tenderness.   Musculoskeletal: Normal range of motion.   Good ROM at the hips without significant pain.    Neurological: She is alert.   A&O x2. Able to follow commands. Answers simple questions though pt with baseline dementia. No focal weakness.   Skin: Skin is warm and dry. No rash noted.         ED Course   Critical Care  Date/Time: 2/11/2017 11:21 AM  Performed by: MANDI ARREOLA  Authorized by: MARQUIS STONE   Total critical care time (exclusive of procedural time) : 40 minutes  Critical care time was exclusive of teaching time and separately billable procedures and treating other patients.  Critical care was necessary to treat or prevent imminent or life-threatening deterioration of the following conditions: CNS failure or compromise.  Critical care was time spent personally by me on the following activities: pulse oximetry,  review of old charts, ordering and review of laboratory studies, obtaining history from patient or surrogate, discussions with consultants, examination of patient, development of treatment plan with patient or surrogate, ordering and performing treatments and interventions, ordering and review of radiographic studies and re-evaluation of patient's condition.        Labs Reviewed   CBC W/ AUTO DIFFERENTIAL - Abnormal; Notable for the following:        Result Value    WBC 13.15 (*)     RBC 3.89 (*)     Hematocrit 36.3 (*)     MCH 31.6 (*)     Gran # 11.3 (*)     Lymph # 0.7 (*)     Mono # 1.1 (*)     Gran% 86.1 (*)     Lymph% 5.5 (*)     All other components within normal limits   BASIC METABOLIC PANEL - Abnormal; Notable for the following:     Glucose 115 (*)     eGFR if non  58 (*)     All other components within normal limits   CK - Abnormal; Notable for the following:      (*)     All other components within normal limits   TROPONIN I   PROTIME-INR   URINALYSIS             Medical Decision Making:   History:   Old Medical Records: I decided to obtain old medical records.  Initial Assessment:   85 yo f, h/o dementia, s/p fall today, ? Mechanical, with subdural hematoma, R clavicle fx and R inferior pubic ramus fx.  Pt well-appearing with no focal weakness.  No anticoagulation or antiplatelet agents.    Differential Diagnosis:   R subdural: small, neurologically seems stable  -repeat CT head  -add CT cspine  -neurosurgery consulted - think pt should be admitted to general surgery as trauma pt    R inferior pubic ramus fx   -CT pelvis to further characterize    General surgery consulted - will likely admit pt  Clinical Tests:   Lab Tests: Ordered and Reviewed  Radiological Study: Ordered and Reviewed  Medical Tests: Ordered and Reviewed            Scribe Attestation:   Scribe #1: I performed the above scribed service and the documentation accurately describes the services I performed. I attest  to the accuracy of the note.    Attending Attestation:           Physician Attestation for Scribe:  Physician Attestation Statement for Scribe #1: I, Dr. Fishman, reviewed documentation, as scribed by Dionicio Sullivan in my presence, and it is both accurate and complete.                 ED Course     Clinical Impression:   The primary encounter diagnosis was Subdural hematoma. Diagnoses of Fall, Closed nondisplaced fracture of acromial end of right clavicle, initial encounter, Inferior pubic ramus fracture, right, closed, initial encounter, Head trauma, Closed traumatic minimally displaced fracture of acromial end of right clavicle, Head trauma, initial encounter, and Debility were also pertinent to this visit.          Gala Fishman MD  02/11/17 1122

## 2017-02-08 ENCOUNTER — TELEPHONE (OUTPATIENT)
Dept: NEUROSURGERY | Facility: CLINIC | Age: 82
End: 2017-02-08

## 2017-02-08 PROBLEM — S09.90XA HEAD TRAUMA: Status: ACTIVE | Noted: 2017-02-08

## 2017-02-08 LAB
ANION GAP SERPL CALC-SCNC: 8 MMOL/L
BASOPHILS # BLD AUTO: 0.02 K/UL
BASOPHILS NFR BLD: 0.3 %
BUN SERPL-MCNC: 10 MG/DL
CALCIUM SERPL-MCNC: 8.5 MG/DL
CHLORIDE SERPL-SCNC: 104 MMOL/L
CO2 SERPL-SCNC: 25 MMOL/L
CREAT SERPL-MCNC: 0.8 MG/DL
DIFFERENTIAL METHOD: ABNORMAL
EOSINOPHIL # BLD AUTO: 0.3 K/UL
EOSINOPHIL NFR BLD: 4 %
ERYTHROCYTE [DISTWIDTH] IN BLOOD BY AUTOMATED COUNT: 14.5 %
EST. GFR  (AFRICAN AMERICAN): >60 ML/MIN/1.73 M^2
EST. GFR  (NON AFRICAN AMERICAN): >60 ML/MIN/1.73 M^2
GLUCOSE SERPL-MCNC: 84 MG/DL
HCT VFR BLD AUTO: 34.6 %
HGB BLD-MCNC: 11.7 G/DL
INR PPP: 0.9
LYMPHOCYTES # BLD AUTO: 1 K/UL
LYMPHOCYTES NFR BLD: 16.6 %
MAGNESIUM SERPL-MCNC: 1.9 MG/DL
MCH RBC QN AUTO: 31.1 PG
MCHC RBC AUTO-ENTMCNC: 33.8 %
MCV RBC AUTO: 92 FL
MONOCYTES # BLD AUTO: 0.8 K/UL
MONOCYTES NFR BLD: 12 %
NEUTROPHILS # BLD AUTO: 4.2 K/UL
NEUTROPHILS NFR BLD: 66.9 %
PHOSPHATE SERPL-MCNC: 2.7 MG/DL
PLATELET # BLD AUTO: 261 K/UL
PMV BLD AUTO: 10.6 FL
POTASSIUM SERPL-SCNC: 3.8 MMOL/L
PROTHROMBIN TIME: 10.2 SEC
RBC # BLD AUTO: 3.76 M/UL
SODIUM SERPL-SCNC: 137 MMOL/L
WBC # BLD AUTO: 6.25 K/UL

## 2017-02-08 PROCEDURE — 85610 PROTHROMBIN TIME: CPT

## 2017-02-08 PROCEDURE — 83735 ASSAY OF MAGNESIUM: CPT

## 2017-02-08 PROCEDURE — 97161 PT EVAL LOW COMPLEX 20 MIN: CPT

## 2017-02-08 PROCEDURE — 25000003 PHARM REV CODE 250: Performed by: ANESTHESIOLOGY

## 2017-02-08 PROCEDURE — 99233 SBSQ HOSP IP/OBS HIGH 50: CPT | Mod: 24,GC,, | Performed by: SURGERY

## 2017-02-08 PROCEDURE — 25000003 PHARM REV CODE 250: Performed by: SURGERY

## 2017-02-08 PROCEDURE — 84100 ASSAY OF PHOSPHORUS: CPT

## 2017-02-08 PROCEDURE — 63600175 PHARM REV CODE 636 W HCPCS: Performed by: STUDENT IN AN ORGANIZED HEALTH CARE EDUCATION/TRAINING PROGRAM

## 2017-02-08 PROCEDURE — 86580 TB INTRADERMAL TEST: CPT | Performed by: STUDENT IN AN ORGANIZED HEALTH CARE EDUCATION/TRAINING PROGRAM

## 2017-02-08 PROCEDURE — 11000001 HC ACUTE MED/SURG PRIVATE ROOM

## 2017-02-08 PROCEDURE — 97535 SELF CARE MNGMENT TRAINING: CPT

## 2017-02-08 PROCEDURE — 80048 BASIC METABOLIC PNL TOTAL CA: CPT

## 2017-02-08 PROCEDURE — G8978 MOBILITY CURRENT STATUS: HCPCS | Mod: CN

## 2017-02-08 PROCEDURE — 94761 N-INVAS EAR/PLS OXIMETRY MLT: CPT

## 2017-02-08 PROCEDURE — 97165 OT EVAL LOW COMPLEX 30 MIN: CPT

## 2017-02-08 PROCEDURE — 85025 COMPLETE CBC W/AUTO DIFF WBC: CPT

## 2017-02-08 PROCEDURE — 25000003 PHARM REV CODE 250: Performed by: PHYSICIAN ASSISTANT

## 2017-02-08 PROCEDURE — G8979 MOBILITY GOAL STATUS: HCPCS | Mod: CL

## 2017-02-08 RX ORDER — SODIUM,POTASSIUM PHOSPHATES 280-250MG
2 POWDER IN PACKET (EA) ORAL
Status: DISCONTINUED | OUTPATIENT
Start: 2017-02-08 | End: 2017-02-10 | Stop reason: HOSPADM

## 2017-02-08 RX ORDER — LANOLIN ALCOHOL/MO/W.PET/CERES
800 CREAM (GRAM) TOPICAL
Status: DISCONTINUED | OUTPATIENT
Start: 2017-02-08 | End: 2017-02-10 | Stop reason: HOSPADM

## 2017-02-08 RX ORDER — LISINOPRIL 20 MG/1
20 TABLET ORAL DAILY
Status: DISCONTINUED | OUTPATIENT
Start: 2017-02-08 | End: 2017-02-10 | Stop reason: HOSPADM

## 2017-02-08 RX ORDER — POTASSIUM CHLORIDE 20 MEQ/15ML
40 SOLUTION ORAL
Status: DISCONTINUED | OUTPATIENT
Start: 2017-02-08 | End: 2017-02-10 | Stop reason: HOSPADM

## 2017-02-08 RX ORDER — POTASSIUM CHLORIDE 20 MEQ/15ML
60 SOLUTION ORAL
Status: DISCONTINUED | OUTPATIENT
Start: 2017-02-08 | End: 2017-02-10 | Stop reason: HOSPADM

## 2017-02-08 RX ADMIN — Medication 3 ML: at 01:02

## 2017-02-08 RX ADMIN — Medication 3 ML: at 09:02

## 2017-02-08 RX ADMIN — HEPARIN SODIUM 5000 UNITS: 5000 INJECTION, SOLUTION INTRAVENOUS; SUBCUTANEOUS at 01:02

## 2017-02-08 RX ADMIN — Medication 3 ML: at 05:02

## 2017-02-08 RX ADMIN — Medication 5 UNITS: at 01:02

## 2017-02-08 RX ADMIN — ESCITALOPRAM 20 MG: 20 TABLET, FILM COATED ORAL at 08:02

## 2017-02-08 RX ADMIN — HEPARIN SODIUM 5000 UNITS: 5000 INJECTION, SOLUTION INTRAVENOUS; SUBCUTANEOUS at 09:02

## 2017-02-08 RX ADMIN — LISINOPRIL 20 MG: 20 TABLET ORAL at 09:02

## 2017-02-08 RX ADMIN — POTASSIUM & SODIUM PHOSPHATES POWDER PACK 280-160-250 MG 2 PACKET: 280-160-250 PACK at 12:02

## 2017-02-08 RX ADMIN — ACETAMINOPHEN 650 MG: 325 TABLET ORAL at 09:02

## 2017-02-08 RX ADMIN — POTASSIUM & SODIUM PHOSPHATES POWDER PACK 280-160-250 MG 2 PACKET: 280-160-250 PACK at 08:02

## 2017-02-08 RX ADMIN — HEPARIN SODIUM 5000 UNITS: 5000 INJECTION, SOLUTION INTRAVENOUS; SUBCUTANEOUS at 05:02

## 2017-02-08 NOTE — TELEPHONE ENCOUNTER
----- Message from Olya Morrow MA sent at 2/8/2017  7:47 AM CST -----  Patient scheduled. Appointment slips mailed.   ----- Message -----     From: Porter Duenas DO     Sent: 2/7/2017   6:55 PM       To: Olya Morrow MA    Please coordinate fu in clinic in 2 weeks with PA.  I have ordered CT head.  Thanks.

## 2017-02-08 NOTE — PROGRESS NOTES
Progress Note  ACS    Admit Date: 2/6/2017  Attending: Ramana  S/P: * No surgery found *    Post-operative Day:      Hospital Day: 3    SUBJECTIVE:     Pt resting comfortably. No focal deficits.  No headache.  No belly pain. PT saw her this morning,     OBJECTIVE:     Vital Signs (Most Recent)    Temp: 98.4 °F (36.9 °C) (02/08/17 0715)  Pulse: 68 (02/08/17 1000)  Resp: 17 (02/08/17 1000)  BP: (!) 149/74 (02/08/17 1000)  SpO2: 100 % (02/08/17 1000)    Vital Signs Range (Last 24H):  Temp:  [98 °F (36.7 °C)-98.5 °F (36.9 °C)]   Pulse:  [53-75]   Resp:  [15-31]   BP: (127-166)/()   SpO2:  [93 %-100 %]     I & O (Last 24H):    Intake/Output Summary (Last 24 hours) at 02/08/17 1014  Last data filed at 02/08/17 0500   Gross per 24 hour   Intake             1298 ml   Output              200 ml   Net             1098 ml     Physical Exam:    General: well developed, well nourished, no distress  Lungs:  clear to auscultation bilaterally and normal respiratory effort  Heart: regular rate and rhythm  Abdomen: soft, non-tender non-distented; bowel sounds normal; no masses,  no organomegaly  Neuro:  ROD    Wound/Incision:  clean, dry, intact    Laboratory:  CBC:     Recent Labs  Lab 02/08/17  0400   WBC 6.25   RBC 3.76*   HGB 11.7*   HCT 34.6*      MCV 92   MCH 31.1*   MCHC 33.8     CMP:     Recent Labs  Lab 02/08/17  0400   GLU 84   CALCIUM 8.5*      K 3.8   CO2 25      BUN 10   CREATININE 0.8     Coagulation:     Recent Labs  Lab 02/08/17  0400   INR 0.9     Labs within the past 24 hours have been reviewed.    ASSESSMENT/PLAN:     Assessment:     87 y/o female s/p fall with acute right SDH and right clavicle fracture and pelvic fracture    Plan:     Follow up with NSGY in two weeks for repeat CT head  F/u ortho recs: Sling for RUE NWB, WB as tolerated with bilateral LE.   [facility].   [facility] with [facility]  Regular diet.   Dispo: discussion with care management.     Simona  Kera Tavares MD  General Surgery PGY 3  Beeper: [phone]

## 2017-02-08 NOTE — PT/OT/SLP EVAL
Occupational Therapy  Evaluation    Oksana Estrada   MRN: [id number]   Admitting Diagnosis: Subdural hematoma    OT Date of Treatment: 02/08/17   OT Start Time: 1340  OT Stop Time: 1358  OT Total Time (min): 18 min    Billable Minutes:  Evaluation 9  Self Care/Home Management 9    Diagnosis: Subdural hematoma s/p fall at home  Pt also fractured her R clavicle and L pubic rami  She is NWB R UE and is to wear the sling  Pt is WBAT on L LE    Past Medical History   Diagnosis Date    Dementia     Depression     Hypertension     Stroke       Past Surgical History   Procedure Laterality Date    Breast surgery         Referring physician: MD Frieda  Date referred to OT: 2/7/2017    General Precautions: Standard,    Orthopedic Precautions: RUE non weight bearing, LLE weight bearing as tolerated  Braces: UE Sling (Right side)    Do you have any cultural, spiritual, Moravian conflicts, given your current situation?: none     Patient History:  Living Environment  Lives With: alone (but this is not the d/c plan)  Transportation Available: car, family or friend will provide  Equipment Currently Used at Home: walker, rolling, shower chair, cane, straight     PT spoke with pt's son just prior to evaluation. Son wishes to take his mother home and will hire assistance during the hours he is at work.  He does not have steps and has a walk in shower.  Pt will need a bedside commode, w/c and home health OT services.      Prior level of function:   Bed Mobility/Transfers: independent  Grooming: independent  Bathing: independent  Upper Body Dressing: independent  Lower Body Dressing: independent  Toileting: independent  Home Management Skills: independent      Dominant hand: left    Subjective:  Communicated with RN prior to session.  Pt agreeable to therapy. Expressed need for BSC  Chief Complaint: 5/10 pain in R shoulder  Patient/Family stated goals: Pt agreeable to progressing towards independence with ADLs and  functional mobility.     Pain Ratin/10     Pain Addressed: Nurse notified, Reposition  Pain Rating Post-Intervention: 5/10    Objective:    Cognitive Exam:  Oriented to: Person, Place and year but not situation  Follows Commands/attention: Follows one-step commands  Communication: clear/fluent  Memory:  Impaired LTM  Safety awareness/insight to disability: pt kept putting weight onto R UE, attempted to push up using R UE  Coping skills/emotional control: Appropriate to situation    Physical Exam:  Postural examination/scapula alignment: Rounded shoulder and Head forward  Skin integrity: Bruising of R shoulder and upper arm  Edema: Mild R UE    Sensation:   Intact    Upper Extremity Range of Motion:  Right Upper Extremity: not  Tested, immobilized  Left Upper Extremity: WFL    Upper Extremity Strength:  Right Upper Extremity: not tested, immobilized  Left Upper Extremity: WFL   Strength: WFL    Fine motor coordination:   Intact    Gross motor coordination: impaired    Functional Mobility:  Bed Mobility:  Pt was already up in bedside chair upon arrival  Would likely need max A for supine <>sit    Transfers:  Sit <> Stand Assistance: Maximum Assistance  Sit <> Stand Assistive Device: No Assistive Device  Toilet Transfer Assistance: Total Assistance (total assist for ambulating ~4 feet from chair to BSC)  Toilet Transfer Assistive Device: No Assistive Device    Functional Ambulation: total A x2 (leans back and has very short stride length)    Activities of Daily Living:  Feeding Level of Assistance: Activity did not occur  Feeding adaptive equipment: none  UE Dressing Level of Assistance: Total assistance  UE adaptive equipment: none  LE Dressing Level of Assistance: Total assistance  LE adaptive equipment: none  Grooming Level of Assistance: Minimum assistance (for washing hands)  Toileting Where Assessed: Bedside commode  Toileting Level of Assistance: Total assistance (pt is able to transfer via ambulation  "with total assist for gait, requires max A for standing but able to clean front and back while standing supported.)    Balance:   Static Sit: FAIR-: Maintains without assist but inconsistent   Dynamic Sit: FAIR+: Maintains balance through MINIMAL excursions of active trunk motion  Static Stand: 0: Needs MAXIMAL assist to maintain   Dynamic stand: 0: N/A    AM-PAC 6 CLICK ADL  How much help from another person does this patient currently need?  1 = Unable, Total/Dependent Assistance  2 = A lot, Maximum/Moderate Assistance  3 = A little, Minimum/Contact Guard/Supervision  4 = None, Modified [place]/Independent    Putting on and taking off regular lower body clothing? : 1  Bathing (including washing, rinsing, drying)?: 1  Toileting, which includes using toilet, bedpan, or urinal? : 2  Putting on and taking off regular upper body clothing?: 1  Taking care of personal grooming such as brushing teeth?: 3  Eating meals?: 3  Total Score: 11    AM-PAC Raw Score CMS "G-Code Modifier Level of Impairment Assistance   6 % Total / Unable   7 - 9 CM 80 - 100% Maximal Assist   10 - 14 CL 60 - 80% Moderate Assist   15 - 19 CK 40 - 60% Moderate Assist   20 - 22 CJ 20 - 40% Minimal Assist   23 CI 1-20% SBA / CGA   24 CH 0% Independent/ Mod I       Patient left in w/c awaiting stepdown to room 1104 with call button in reach    Assessment:  Oksana Estrada is a 86 y.o. female with a medical diagnosis of Subdural hematoma and presents with decline in ADLs and functional mobility. Pt would benefit from skilled OT services in order to maximize independence with ADLs and facilitate safe discharge.    Pt presented with a low complexity OT evaluation.  Pt required a brief an expanded review of medical history and occupational profile.  Pt demod 5+ performance deficits (physical, cognitive, or psychosocial) resulting in limitations and engagement restrictions.  Clinical decision making required low analytical complexity with " several treatment options.  Pt with cormorbidities and required significant modification of task/assistance with assessment.      Rehab identified problem list/impairments: Rehab identified problem list/impairments: weakness, impaired endurance, impaired sensation, impaired functional mobilty, impaired self care skills, gait instability, decreased coordination, decreased upper extremity function, decreased lower extremity function, decreased safety awareness    Rehab potential is good.    Activity tolerance: Good    Discharge recommendations: Discharge Facility/Level Of Care Needs: home with home health (AND 24 HOUR CARE- IF UNAVAILABLE THEN SNF)     Barriers to discharge: Barriers to Discharge: Inaccessible home environment, Decreased caregiver support    Equipment recommendations: bedside commode, wheelchair are neccessary    GOALS:   Occupational Therapy Goals        Problem: Occupational Therapy Goal    Goal Priority Disciplines Outcome Interventions   Occupational Therapy Goal     OT, PT/OT     Description:  Goals to be met by: 2/18/2017    Patient will increase functional independence with ADLs by performing:    Grooming while seated in w/c with Minimal Assistance.  Toileting from bedside commode with Moderate Assistance for hygiene and clothing management.   Bathing from  sitting at sink in w/c with Moderate Assistance.  Toilet transfer to bedside commode with Minimal Assistance to reduce the need for physical assist during transfers.              PLAN:  Patient to be seen 4 x/week to address the above listed problems via self-care/home management, therapeutic activities, therapeutic exercises, neuromuscular re-education  Plan of Care expires:  3/7/2017  Plan of Care reviewed with: patient, GLO Man  02/08/2017

## 2017-02-08 NOTE — PLAN OF CARE
Problem: Occupational Therapy Goal  Goal: Occupational Therapy Goal  Goals to be met by: 2/18/2017    Patient will increase functional independence with ADLs by performing:    Grooming while seated in w/c with Minimal Assistance.  Toileting from bedside commode with Moderate Assistance for hygiene and clothing management.   Bathing from sitting at sink in w/c with Moderate Assistance.  Toilet transfer to bedside commode with Minimal Assistance to reduce the need for physical assist during transfers.  Evaluation completed.  OT plan of care developed and reviewed with patient.

## 2017-02-08 NOTE — H&P
History & Physical  Surgical Intensive Care    SUBJECTIVE:     Chief Complaint/Reason for Admission: SDH    History of Present Illness:  Patient is a 86 y.o. female with active co-morbidities of dementia, CVA, HTN, and breast cancer treated with axillary dissection and radiation. She is s/p a fall at home suspected around noon on 2/6/2017 .She was unable to get up and called for help with her lifealert. The patient presented to the outside facility and had a workup there including a Head CT which showed an acute right subdural with no midline shift or mass effect. She had a right clavicle fracture and left pubic ramus fracture. Neurosurgery was consulted and advised transfer.      On evaluation, the patient does not remember falling. She states that she is tired, but denies any C-spine pain.       PTA Medications   Medication Sig    lisinopril (PRINIVIL,ZESTRIL) 20 MG tablet TAKE 1 TABLET DAILY FOR    HYPERTENSION    alendronate (FOSAMAX) 70 MG tablet     escitalopram oxalate (LEXAPRO) 20 MG tablet TAKE 1 TABLET ONCE DAILY    lovastatin (MEVACOR) 10 MG tablet        Review of patient's allergies indicates:   Allergen Reactions    Penicillins        Past Medical History   Diagnosis Date    Dementia     Depression     Hypertension     Stroke      Past Surgical History   Procedure Laterality Date    Breast surgery       History reviewed. No pertinent family history.  Social History   Substance Use Topics    Smoking status: Former Smoker     Types: Cigarettes    Smokeless tobacco: None      Comment: quit 25 years ago    Alcohol use No        Review of Systems:  Eyes: no visual changes  Respiratory: no cough or shortness of breath  Cardiovascular: no chest pain or palpitations  Gastrointestinal: no nausea or vomiting   Hematologic/Lymphatic:no chills or lymphadenopathy  Musculoskeletal: no arthralgias or myalgias  Neurological: + confusion, no seizures or tremors  Endocrine: +cold intolerance, no heat  intolerance    OBJECTIVE:     Vital Signs (Most Recent)  Temp: 98.5 °F (36.9 °C) (02/07/17 1900)  Pulse: 63 (02/07/17 2200)  Resp: (!) 29 (02/07/17 2200)  BP: (!) 160/69 (02/07/17 2200)  SpO2: 96 % (02/07/17 2200)  Ventilator Data (Last 24H):       Hemodynamic Parameters (Last 24H):       Physical Exam:  General: AOx3, resting comfortably in bed, NAD  HEENT: PERRLA, EOMI, no lateral tongue deviation, no LAD  Chest: RRR, CTA bibasilar  Abd: midline incision inferior to umbilicus, SNTTTP  Ext: no edema      Lines/Drains:       Peripheral IV - Single Lumen 02/06/17 2351 Right Antecubital (Active)   Site Assessment Clean;Dry;No redness;No swelling;Intact;No drainage;No warmth 2/7/2017  7:00 PM   Line Status Infusing 2/7/2017  7:00 PM   Dressing Status Clean;Intact;Dry 2/7/2017  7:00 PM   Dressing Intervention Dressing reinforced 2/7/2017  7:00 PM   Dressing Change Due 02/10/17 2/7/2017  7:00 PM   Site Change Due 02/10/17 2/7/2017  7:00 PM   Reason Not Rotated Not due 2/7/2017  7:00 PM   Number of days:0       Laboratory  CBC:   Recent Labs  Lab 02/06/17 2351   WBC 13.15*   RBC 3.89*   HGB 12.3   HCT 36.3*      MCV 93   MCH 31.6*   MCHC 33.9     BMP:   Recent Labs  Lab 02/06/17  2351   *      K 4.2      CO2 27   BUN 12   CREATININE 0.9   CALCIUM 9.2     CMP:   Recent Labs  Lab 02/06/17  2351   *   CALCIUM 9.2      K 4.2   CO2 27      BUN 12   CREATININE 0.9     Coagulation:   Recent Labs  Lab 02/06/17  2351   INR 1.0     ABGs: No results for input(s): PH, PCO2, PO2, HCO3, POCSATURATED, BE in the last 168 hours.      Chest X-Ray 2/7/2017:  One view: There is a distal clavicle fracture with mild displacement.  There is DJD.  No dislocation seen.    Diagnostic Results:  CT Head 2/7/17:  - Stable thin subdural hematoma over the right cerebral convexity, without significant mass effect.   - Mild generalized cerebral volume loss and chronic microvascular ischemic changes.  -  Moderate spondylotic changes, but no fracture or traumatic malalignment in the cervical spine.    CT Pelv 2/7/2017:   1.  Remote fracture of the left superior and inferior pubic ramus.  No acute fracture or dislocation.  2.  Sigmoid diverticulosis.    ASSESSMENT/PLAN:       Preventive Measures:    PPx:  - protonix  - SCD, TEDs    Plan:  Neuro:   - AOx3   - Q1hr neuro checks for 24 hrs from fall incident  - pain meds    Pulmonary:   - IS    Cardiac:    Renal:   - BUN/Cr 12/0.9  - trend BUN/Cr    Infectious Disease:   - AF  - WBC 13   - trend WBC    Hematology/Oncology:  - H/H 12.3/36.3  - INR 1.0  - aPTT 28.8    Endocrine:    Fluids/Electrolytes/Nutrition/GI:     Dispo:  - Q1 hr Neuro checks until 24 hrs s/p fall  - stepdown       Gala Vasquez MD  PGY-1  Surgical Intensive Care Unit

## 2017-02-08 NOTE — PLAN OF CARE
Problem: Physical Therapy Goal  Goal: Physical Therapy Goal  Goals to be met by: 2/15/17    Patient will increase functional independence with mobility by performin. Supine to sit with MInimal Assistance - not met  2. Sit to stand transfer with Minimal Assistance - not met  3. Gait x 30 feet with Minimal Assistance using AD if needed.- not met  eval completed and goals appropriate. Carola Donohue, PT 2017

## 2017-02-08 NOTE — PLAN OF CARE
CM met with pt to talk about next level of care, CM also reviewed with PT recommended SNF.  Reviewed this with pt's son and he will discuss this with his sister.  According to son, they have been trying to get their mother to move in with one of them because she has fallen in the past - thus the Life Alert button.  According to son he does not feel pt will do well in a NH / SNF and would rather her go to one of their homes and get PT/OT via HH.  Asked son if someone would be with her during the day, he stated at his sisters house but not at his.      Plan:  Updated team, CM waiting to hear back from son to see which house they have decided on so HH can be set up.    Rebeca Barry RN, CCM [facility]  SICU/ENT/Vascular Surgery  Ext [phone]

## 2017-02-08 NOTE — PT/OT/SLP EVAL
Physical Therapy  Evaluation    Oksana Estrada   MRN: [id number]   Admitting Diagnosis: Subdural hematoma non-displaced fx R clavicle, inferior pubic ramus fx.    PT Received On: 02/08/17  PT Start Time: 1348     PT Stop Time: 1400    PT Total Time (min): 12 min       Billable Minutes:  Evaluation 12 min    Diagnosis: Subdural hematoma  To ED after fall. Pt was transferred from [facility].     Past Medical History   Diagnosis Date    Dementia     Depression     Hypertension     Stroke       Past Surgical History   Procedure Laterality Date    Breast surgery         Referring physician: Wallace Malave  Date referred to PT: 2/7/17    General Precautions: Standard,    Orthopedic Precautions: RUE non weight bearing   Braces:  (RUE sling)       Do you have any cultural, spiritual, Yarsanism conflicts, given your current situation?:  (none)    Patient History:  Lives With: alone (pt had paid assistance 3hrs/day x 2 days/wk prior to admit. pt will live with son after discharge. )  Living Arrangements: house ( 1 story, no steps walk in shower. pt son states that he will hire help.)  Equipment Currently Used at Home:  (RW, shower chair, SC)  DME owned (not currently used): none    Previous Level of Function:  Ambulation Skills: needs device (pt used RW prior to admit.)  Transfer Skills: needs device (pt used RW prior to admit.)    Subjective:  Communicated with nurse prior to session.    Chief Complaint: pt c/o pain during treatment but did not quantify it.  Patient goals: to get better and go home.     Pain Rating:  (pt c/o pain but did not quantify it.)                    Objective:   Patient found with: telemetry, blood pressure cuff, pulse ox (continuous)     Cognitive Exam:  Oriented to: Person, Place and Time    Follows Commands/attention: Follows multistep  commands  Communication: clear/fluent  Safety awareness/insight to disability: impaired    Physical Exam:    Lower Extremity Range of Motion:  Right  Lower Extremity: WFL  Left Lower Extremity: WFL    Lower Extremity Strength:  Right Lower Extremity: WFL  Left Lower Extremity: WFL       Functional Mobility:  Bed Mobility:  Rolling/Turning to Left:  (not tested. pt was sitting in chair for treatment. )    Transfers:  Sit <> Stand Assistance: Maximum Assistance (pt needed verbal cues for functional mobility with NWB RUE.)  Sit <> Stand Assistive Device: No Assistive Device    Gait:   Gait Distance:  3 ft  Assistance 1: Total assistance (max assist x 2. pt leaned backwards with standing and with gait .)  Gait Assistive Device: No device      AM-PAC 6 CLICK MOBILITY  How much help from another person does this patient currently need?   1 = Unable, Total/Dependent Assistance  2 = A lot, Maximum/Moderate Assistance  3 = A little, Minimum/Contact Guard/Supervision  4 = None, Modified [place]/Independent    Turning over in bed (including adjusting bedclothes, sheets and blankets)?: 2  Sitting down on and standing up from a chair with arms (e.g., wheelchair, bedside commode, etc.): 2  Moving from lying on back to sitting on the side of the bed?: 2  Moving to and from a bed to a chair (including a wheelchair)?: 2  Need to walk in hospital room?: 2  Climbing 3-5 steps with a railing?: 1  Total Score: 11     -PAC Raw Score CMS G-Code Modifier Level of Impairment Assistance   6 % Total / Unable   7 - 9 CM 80 - 100% Maximal Assist   10 - 14 CL 60 - 80% Moderate Assist   15 - 19 CK 40 - 60% Moderate Assist   20 - 22 CJ 20 - 40% Minimal Assist   23 CI 1-20% SBA / CGA   24 CH 0% Independent/ Mod I     Patient left up in chair with all lines intact and call button in reach.    Assessment:   Oksana Estrada is a 86 y.o. female with a medical diagnosis of Subdural hematoma, non-displaced fx R clavicle, inferior pubic ramus fx. and presents with decreased mobility, balance, transfers and decreased distance ambulated. Pt would benefit from cont PT to address  deficits and can go home to son's house with HHPT and 24 hour supervision. Pt will need BSC and W/C. Pt will benefit from skilled PT 5x/wk to maximize functional return and lessen burden of care for family. .    Rehab identified problem list/impairments: Rehab identified problem list/impairments: impaired endurance, impaired functional mobilty, gait instability, impaired balance    Rehab potential is good.    Activity tolerance: Fair    Discharge recommendations: Discharge Facility/Level Of Care Needs: home health PT (if 24 hour assistance is available.)     Barriers to discharge: Barriers to Discharge: Decreased caregiver support, Inaccessible home environment    Equipment recommendations: Equipment Needed After Discharge: wheelchair, bedside commode     GOALS:   Physical Therapy Goals        Problem: Physical Therapy Goal    Goal Priority Disciplines Outcome Goal Variances Interventions   Physical Therapy Goal     PT/OT, PT      Description:  Goals to be met by: 2/15/17    Patient will increase functional independence with mobility by performin. Supine to sit with MInimal Assistance - not met  2. Sit to stand transfer with Minimal Assistance - not met  3. Gait  x 30 feet with Minimal Assistance using AD if needed.- not met                PLAN:    Patient to be seen 5 x/week to address the above listed problems via gait training, therapeutic activities  Plan of Care expires: 17  Plan of Care reviewed with: patient    Functional Assessment Tool Used: FIM  Score: 1  Functional Limitation: Mobility: Walking and moving around  Mobility: Walking and Moving Around Current Status (): CN  Mobility: Walking and Moving Around Goal Status (): JACINTO Donohue, PT  2017

## 2017-02-08 NOTE — PROGRESS NOTES
Repeat CT head reviewed.  Small right convexity SDH with no detrimental change from prior.  CT c spine w/o acute frx or dislocation.  Will sign off and will coordinate fu in clinic in 2 weeks with repeat CT head.  Continue care per primary team.

## 2017-02-08 NOTE — PLAN OF CARE
ALMA spoke with the CM.  Pt will dc home with HH and 24 hour assistance.  The pt's children are deciding which of their houses the pt will dc to ([place] vs [place]).  According to the assessment, pt has no HH preference.  SW saved the H&P, orders, and fs to [facility].  Once the family decides where the pt will go at dc, the SW will send the HH to an agency.      Marion Infante, [facility] x [phone]

## 2017-02-08 NOTE — NURSING TRANSFER
Nursing Transfer Note      2/8/2017     Transfer To: [address]    Transfer via wheelchair    Transfer with RN    Transported by [employer]    Medicines sent: NA    Chart send with patient: Yes    Notified: son    Patient reassessed at: 02/08/2017 2:38 PM  (date, time)    Upon arrival to floor: patient oriented to room, call bell in reach and bed in lowest position

## 2017-02-08 NOTE — PLAN OF CARE
Patient stable throughout shift. Neurovascular checks Q1 with no changes in LOC. Pt AAOx4. POC reviewed with patient and family. Safety measures maintained. Refer to flowsheet for details.

## 2017-02-08 NOTE — PLAN OF CARE
Problem: Patient Care Overview  Goal: Plan of Care Review  Outcome: Ongoing (interventions implemented as appropriate)  Plan of care reviewed with pt.  Vitals stable throughout shift.  Neuro checks negative and pt did well throughout the night.  Did not want pain medication but did complain of pain when moving.  Pt sat to edge of bed and used bedpan.  Urine did leak onto the floor so total amount was not able to be measured.  Will likely transfer today.

## 2017-02-09 ENCOUNTER — TELEPHONE (OUTPATIENT)
Dept: ORTHOPEDICS | Facility: CLINIC | Age: 82
End: 2017-02-09

## 2017-02-09 VITALS
HEART RATE: 78 BPM | DIASTOLIC BLOOD PRESSURE: 65 MMHG | WEIGHT: 150 LBS | OXYGEN SATURATION: 96 % | HEIGHT: 68 IN | SYSTOLIC BLOOD PRESSURE: 143 MMHG | TEMPERATURE: 98 F | RESPIRATION RATE: 22 BRPM | BODY MASS INDEX: 22.73 KG/M2

## 2017-02-09 LAB
ANION GAP SERPL CALC-SCNC: 8 MMOL/L
BASOPHILS # BLD AUTO: 0.01 K/UL
BASOPHILS NFR BLD: 0.1 %
BUN SERPL-MCNC: 12 MG/DL
CALCIUM SERPL-MCNC: 8.1 MG/DL
CHLORIDE SERPL-SCNC: 104 MMOL/L
CO2 SERPL-SCNC: 23 MMOL/L
CREAT SERPL-MCNC: 0.8 MG/DL
DIFFERENTIAL METHOD: ABNORMAL
EOSINOPHIL # BLD AUTO: 0.3 K/UL
EOSINOPHIL NFR BLD: 3.9 %
ERYTHROCYTE [DISTWIDTH] IN BLOOD BY AUTOMATED COUNT: 14.7 %
EST. GFR  (AFRICAN AMERICAN): >60 ML/MIN/1.73 M^2
EST. GFR  (NON AFRICAN AMERICAN): >60 ML/MIN/1.73 M^2
GLUCOSE SERPL-MCNC: 100 MG/DL
HCT VFR BLD AUTO: 32.6 %
HGB BLD-MCNC: 11.2 G/DL
LYMPHOCYTES # BLD AUTO: 1.2 K/UL
LYMPHOCYTES NFR BLD: 15.8 %
MCH RBC QN AUTO: 32 PG
MCHC RBC AUTO-ENTMCNC: 34.4 %
MCV RBC AUTO: 93 FL
MONOCYTES # BLD AUTO: 0.6 K/UL
MONOCYTES NFR BLD: 7.3 %
NEUTROPHILS # BLD AUTO: 5.6 K/UL
NEUTROPHILS NFR BLD: 72.6 %
PLATELET # BLD AUTO: 242 K/UL
PMV BLD AUTO: 10.7 FL
POTASSIUM SERPL-SCNC: 3.5 MMOL/L
RBC # BLD AUTO: 3.5 M/UL
SODIUM SERPL-SCNC: 135 MMOL/L
WBC # BLD AUTO: 7.72 K/UL

## 2017-02-09 PROCEDURE — 99223 1ST HOSP IP/OBS HIGH 75: CPT | Mod: GC,,, | Performed by: ORTHOPAEDIC SURGERY

## 2017-02-09 PROCEDURE — 36415 COLL VENOUS BLD VENIPUNCTURE: CPT

## 2017-02-09 PROCEDURE — 63600175 PHARM REV CODE 636 W HCPCS: Performed by: PHYSICIAN ASSISTANT

## 2017-02-09 PROCEDURE — 25000003 PHARM REV CODE 250: Performed by: SURGERY

## 2017-02-09 PROCEDURE — 85025 COMPLETE CBC W/AUTO DIFF WBC: CPT

## 2017-02-09 PROCEDURE — 25000003 PHARM REV CODE 250: Performed by: ANESTHESIOLOGY

## 2017-02-09 PROCEDURE — 80048 BASIC METABOLIC PNL TOTAL CA: CPT

## 2017-02-09 PROCEDURE — 25000003 PHARM REV CODE 250: Performed by: PHYSICIAN ASSISTANT

## 2017-02-09 RX ORDER — TRAMADOL HYDROCHLORIDE 50 MG/1
50 TABLET ORAL EVERY 6 HOURS PRN
Qty: 40 TABLET | Refills: 0 | Status: SHIPPED | OUTPATIENT
Start: 2017-02-09 | End: 2017-02-20

## 2017-02-09 RX ORDER — POTASSIUM CHLORIDE 7.45 MG/ML
10 INJECTION INTRAVENOUS
Status: DISPENSED | OUTPATIENT
Start: 2017-02-09 | End: 2017-02-09

## 2017-02-09 RX ADMIN — LISINOPRIL 20 MG: 20 TABLET ORAL at 09:02

## 2017-02-09 RX ADMIN — HEPARIN SODIUM 5000 UNITS: 5000 INJECTION, SOLUTION INTRAVENOUS; SUBCUTANEOUS at 05:02

## 2017-02-09 RX ADMIN — ESCITALOPRAM 20 MG: 20 TABLET, FILM COATED ORAL at 09:02

## 2017-02-09 RX ADMIN — HEPARIN SODIUM 5000 UNITS: 5000 INJECTION, SOLUTION INTRAVENOUS; SUBCUTANEOUS at 03:02

## 2017-02-09 RX ADMIN — Medication 3 ML: at 05:02

## 2017-02-09 RX ADMIN — POTASSIUM CHLORIDE 10 MEQ: 7.46 INJECTION, SOLUTION INTRAVENOUS at 10:02

## 2017-02-09 NOTE — PLAN OF CARE
Problem: Patient Care Overview  Goal: Plan of Care Review  Outcome: Outcome(s) achieved Date Met:  02/09/17  Pt to be D/C to son's home, c/ transport provided by family.     D/C instructions provided c/ F/U information.     Pt maintained stable VS and remained afebrile.     Pt was free from falls and safety was ensured via upright bed rails x2, continual rounding, and call-light c/in reach.

## 2017-02-09 NOTE — PLAN OF CARE
"Transferred from ICU yesterday. Called son, Michele, ph C [phone], & discussed patient is being discharged today w/HH, DME: equipment: BSC, W/C which is covered under insurer, if he would like;informed him LAURA Morse, informed CM & SW, patient is discharging to sons' home. Asked him what his address is? His address: [address] [place] [address] , [place] [address]. He states,"We have arranged for a personal sitter to be w/her when we are at work. My wife, is a NP, working at [employer]-she will get off today around 5:00 PM, & I will meet them here to pick my mother up today. She drives a car & it will be easier for mother to get into. We would like to use [facility] HH, & yes, order DME: W/C, & BSC." Reassured him all would be arranged.   Spoke to him regarding follow-up appts;he requests for patient to see a different PCP, in [place], Dr. Red Griffith.   Future Appointments  Date Time Provider Department Center   2/20/2017 2:40 PM Red Griffith MD [facility] MED [place] [facility]   2/22/2017 8:00 AM [facility] CT2 64- LIMIT 600 LBS [facility] CT SCAN Efra Fowler   2/22/2017 9:40 AM LAURA Puga [facility] NEUROS7 Efra Fowler      informed him appts will be arranged & be on her discharge paper work. He verbalized his understanding. Updated floor nurse, Bertin WHITAKER [phone], & ALMA Olmedo.        02/09/17 1014   Final Note   Assessment Type Final Discharge Note   Discharge Disposition Home-Health   Discharge planning education complete? Yes   What phone number can be called within the next 1-3 days to see how you are doing after discharge? (son: Michele C [phone])   Hospital Follow Up  Appt(s) scheduled? Yes   Discharge plans and expectations educations in teach back method with documentation complete? Yes   [facility] -- Bedside Delivery? n/a   Discharge/Hospital Encounter Summary to (non-[facility]) PCP n/a   Referral to Outpatient Case Management complete? n/a   Referral to / orders for Home Health " Complete? Yes   30 day supply of medicines given at discharge, if documented non-compliance / non-adherence? n/a   Any social issues identified prior to discharge? No   Did you assess the readiness or willingness of the family or caregiver to support self management of care? Yes   Right Care Referral Info   Post Acute Recommendation Home-care

## 2017-02-09 NOTE — TELEPHONE ENCOUNTER
----- Message from Wallace Malave MD sent at 2/9/2017 10:01 AM CST -----  Please schedule patient for 2 week f/u for right distal clavicle fracture and remote left inf pubic ramus fracture. Thank you.

## 2017-02-09 NOTE — PLAN OF CARE
MICHAEL [facility] for [facility] that team was ready to d/c pt yesterday but we were waiting to hear from son to decide which house pt would be going to.  Son has not returned my phone call at this time.    Rebeca Barry RN, CCM [facility]  SICU/ENT/Vascular Surgery  Ext [phone]

## 2017-02-09 NOTE — TELEPHONE ENCOUNTER
Attempted to reach pt.  No answer.   Scheduled appointment for pt with LAURA Guzman 2/22.   Will see pt after she completes her other appointments same day

## 2017-02-09 NOTE — PROGRESS NOTES
Progress Note  General Surgery    Admit Date: 2/6/2017  Post-operative Day:    Hospital Day: 4    SUBJECTIVE:     Pt seen and examined, no acute events overnight, , patient denies pain, right shoulder in sling, states she will be going to son's house; tolerating regular diet    Scheduled Meds:   escitalopram oxalate  20 mg Oral Daily    heparin (porcine)  5,000 Units Subcutaneous Q8H    lisinopril  20 mg Oral Daily    sodium chloride 0.9%  3 mL Intravenous Q8H     Continuous Infusions:   PRN Meds:acetaminophen, hydrocodone-acetaminophen 5-325mg, magnesium oxide, magnesium oxide, ondansetron, potassium chloride 10%, potassium chloride 10%, potassium chloride 10%, potassium, sodium phosphates, potassium, sodium phosphates, potassium, sodium phosphates    Review of patient's allergies indicates:   Allergen Reactions    Penicillins      OBJECTIVE:     Vital Signs (Most Recent)  Temp: 98.1 °F (36.7 °C) (02/09/17 0700)  Pulse: 70 (02/09/17 0700)  Resp: 18 (02/09/17 0700)  BP: (!) 147/67 (02/09/17 0700)  SpO2: 95 % (02/09/17 0700)    Vital Signs Range (Last 24H):  Temp:  [98.1 °F (36.7 °C)-98.5 °F (36.9 °C)]   Pulse:  [58-79]   Resp:  [16-22]   BP: (141-171)/(66-74)   SpO2:  [94 %-100 %]     I & O (Last 24H):No intake or output data in the 24 hours ending 02/09/17 0914  Physical Exam:  General: well developed, well nourished, no distress  Head: normocephalic, atraumatic  Lungs:  normal respiratory effort  Chest Wall: right shoulder and chest with ecchymosis;   Heart: regular rate and rhythm  Abdomen: soft, NTND, no masses  EXT - right arm in sling    Laboratory:  CBC:   Recent Labs  Lab 02/09/17  0608   WBC 7.72   RBC 3.50*   HGB 11.2*   HCT 32.6*      MCV 93   MCH 32.0*   MCHC 34.4     BMP:   Recent Labs  Lab 02/09/17  0608      *   K 3.5      CO2 23   BUN 12   CREATININE 0.8   CALCIUM 8.1*     CMP:   Recent Labs  Lab 02/09/17  0608      CALCIUM 8.1*   *   K 3.5   CO2 23   CL  104   BUN 12   CREATININE 0.8     Coagulation:   Recent Labs  Lab 02/08/17  0400   INR 0.9     Labs within the past 24 hours have been reviewed.    ASSESSMENT/PLAN:   87 yo female s/p fall with acute right SDH and right clavicle fracture, pelvic fracture  -follow up w NSGY in two weeks w rpt head CT  -ortho recs: sling for RUE NWB, WBAT w bilat LE  -PT/OT recommending SNF; family declines and will be taking her home  -continue regular diet  -hypokalemia - replace  -will d/c today to son's house      Cindy Junior PA-C  [id number]

## 2017-02-09 NOTE — DISCHARGE SUMMARY
[facility]  Discharge Summary  General Surgery      Admit Date: 2/6/2017    Discharge Date and Time:  02/09/2017 9:41 AM    Attending Physician: Jack Boles MD     Discharge Provider: Cindy Junior    Reason for Admission: Subdural hematoma    Procedures Performed: * No surgery found *    Hospital Course (synopsis of major diagnoses, care, treatment, and services provided during the course of the hospital stay):   Patient presented to the ED after fall from standing and was transferred to [facility] after being worked up at OSH. Work up at outside hospital revealed SDH, clavicle fracture and pubic rami fracture. She was admitted to the surgical trama service, neurosurgery and orthopedic surgery were consulted. Both consulting services deemed her a non-operative candidate and she will follow up with them as an outpatient. Repeat imaging of her SDH showed no detrimental change and she was stable to start a diet and dvt prophylaxis. She tolerated a diet and had normal bowel functio prior to discharge. Her vitals remained stable, and she was afebrile all throughout her hospital course. Labs were reviewed and electrolytes were replaced appropriately, hypokalemia. She worked with PT/OT who recommended SNF placement, however, family declined, and decided to take her home. Patient was deemed suitable for discharge on hospital day 3. She was discharged home with medications and instructions as below. She voiced understanding of the instructions prior to discharge. She will follow up with neurosurgery (with CTs scans), orthopedics, and her PCP.    For more thorough information, please refer to the hospital records.       Consults: orthopedic surgery and neurosurgery    Significant Diagnostic Studies: Labs:   BMP:   Recent Labs  Lab 02/08/17  0400 02/09/17  0608   GLU 84 100    135*   K 3.8 3.5    104   CO2 25 23   BUN 10 12   CREATININE 0.8 0.8   CALCIUM 8.5* 8.1*   MG 1.9  --    , CMP    Recent Labs  Lab 02/08/17  0400 02/09/17  0608    135*   K 3.8 3.5    104   CO2 25 23   GLU 84 100   BUN 10 12   CREATININE 0.8 0.8   CALCIUM 8.5* 8.1*   ANIONGAP 8 8   ESTGFRAFRICA >60.0 >60.0   EGFRNONAA >60.0 >60.0   , CBC   Recent Labs  Lab 02/08/17  0400 02/09/17  0608   WBC 6.25 7.72   HGB 11.7* 11.2*   HCT 34.6* 32.6*    242   , INR   Lab Results   Component Value Date    INR 0.9 02/08/2017    INR 1.0 02/06/2017    INR 1.9 (H) 03/24/2005    and Troponin   Recent Labs  Lab 02/06/17  2351   TROPONINI 0.020     Radiology: X-Ray: CXR: X-Ray Chest 1 View (CXR):   Results for orders placed or performed during the hospital encounter of 02/06/17   X-Ray Chest 1 View    Narrative    Technique: AP chest radiograph.    Comparison: Radiograph 11/10/2008.    Findings:    Mediastinal structures are midline.  There is atherosclerotic calcification of the thoracic aorta.  Cardiac silhouette is normal in size paralumbar veins are normal and symmetric.  No focal consolidation.  No pneumothorax or pleural effusions.  Scattered high density opacities project over the right lower lung zone, likely chondroid calcifications.  Left axillary clips noted.  No free air beneath the diaphragm.  There is a nondisplaced fracture of the lateral femoral the right clavicle.    Impression    Nondisplaced fracture of the lateral third of the right clavicle.    No acute pulmonary abnormalities.      Electronically signed by: ANDREW MAGUIRE MD  Date:     02/06/17  Time:    23:36        Final Diagnoses:   Principal Problem: Subdural hematoma   Secondary Diagnoses:   Active Hospital Problems    Diagnosis  POA    *Subdural hematoma [I62.00]  Yes    Head trauma [S09.90XA]  Yes    Closed traumatic minimally displaced fracture of acromial end of right clavicle [S42.031A]  Unknown      Resolved Hospital Problems    Diagnosis Date Resolved POA   No resolved problems to display.       Discharged Condition: good    Disposition: Home  or Self Care    Follow Up/Patient Instructions:     Medications:  Reconciled Home Medications:   Current Discharge Medication List      START taking these medications    Details   docusate sodium (COLACE) 50 MG capsule Take 1 capsule (50 mg total) by mouth 2 (two) times daily.  Refills: 0      tramadol (ULTRAM) 50 mg tablet Take 1 tablet (50 mg total) by mouth every 6 (six) hours as needed for Pain.  Qty: 40 tablet, Refills: 0         CONTINUE these medications which have NOT CHANGED    Details   lisinopril (PRINIVIL,ZESTRIL) 20 MG tablet TAKE 1 TABLET DAILY FOR    HYPERTENSION  Qty: 90 tablet, Refills: 3      alendronate (FOSAMAX) 70 MG tablet       escitalopram oxalate (LEXAPRO) 20 MG tablet TAKE 1 TABLET ONCE DAILY  Qty: 90 tablet, Refills: 3      lovastatin (MEVACOR) 10 MG tablet              Discharge Procedure Orders  CT Head Without Contrast   Standing Status: Future  Standing Exp. Date: 02/07/18   Order Specific Question Answer Comments   Reason for Exam: fu CT head, right SDH.    May the Radiologist modify the order per protocol to meet the clinical needs of the patient? Yes      Diet general     Weight bearing restrictions (specify)   Order Comments: NWB RUE and is to wear the sling for comfort  WBAT on LLE     Call MD for:  difficulty breathing or increased cough     Call MD for:  redness, tenderness, or signs of infection (pain, swelling, redness, odor or green/yellow discharge around incision site)     Call MD for:  severe uncontrolled pain     Call MD for:  temperature >100.4     Call MD for:  persistent nausea and vomiting or diarrhea       Follow-up Information     Follow up with Efra Fowler - Neurosurgery 7th Fl On 2/22/2017.    Specialty:  Neurosurgery    Why:  Subdural hematoma follow-up scheduled on 2/22/17: 1. 08:00 AM Cat Scan Head on 2nd floor [facility], 2. 09:40 AM see Patrick SANCHEZ 7th floor.     Contact information:    [address] Fowler  [facility] [address] [phone]     Additional information:    7th Floor        Follow up with Efra Fowler - Orthopedics.    Specialty:  Orthopedics    Contact information:    [address]  [facility] [address] [phone]    Additional information:    [facility] - 5th Floor        Follow up with Kaelyn Rae MD.    Specialty:  Internal Medicine    Contact information:    [address]  [address]  [facility]  [place] [address] [phone]

## 2017-02-09 NOTE — NURSING
D/C orders provided. Pt's son awaiting p/call from PT/OT for home care instructions.    Case Management calling: pt to be D/C to son's home. Transport by family @ 1700.

## 2017-02-09 NOTE — PLAN OF CARE
Per MICHAEL and Michele (son), family has selected [facility] agency for home care needs.  Pt will be going home w/son and dgr-n-law who live @ [address] [place] [address] [place]. [phone].  Pt is scheduled to be picked up by dgr-n-law between 5:00pm - 5:30pm today.  Pt will discharge w/dme see CM notes for equipment.  Per Michele, pt will have sitters in the home when family is unavailable.    SW has completed referral to [facility] via [facility].    Services completed.    Magnolia Pereira LMSW  Ext. [phone]

## 2017-02-09 NOTE — PLAN OF CARE
Visited patient. AAOX4. PCT at BS. Initialed IMM. Updated her on her discharge plans for today: son & daughter-in-law coming to pick her up after 5:00 PM;HH is arranged;Home DME ordered: W/C, BRE to be delivered to hospital room for her to take home. She verbalized her understanding.        02/09/17 1116   Medicare Message   Important Message from Medicare regarding Discharge Appeal Rights Given to patient/caregiver;Explained to patient/caregiver;Signed/date by patient/caregiver   Date IMM was signed 02/09/17   Time IMM was signed 1116

## 2017-02-10 NOTE — PT/OT/SLP DISCHARGE
Physical Therapy Discharge Summary    Oksana Estrada  MRN: [id number]   Subdural hematoma   Patient Discharged from acute Physical Therapy on 17.  Please refer to prior PT noted date on 17 for functional status.     Assessment:   Patient has not met goals.  GOALS:   Physical Therapy Goals        Problem: Physical Therapy Goal    Goal Priority Disciplines Outcome Goal Variances Interventions   Physical Therapy Goal     PT/OT, PT      Description:  Goals to be met by: 2/15/17    Patient will increase functional independence with mobility by performin. Supine to sit with MInimal Assistance - not met  2. Sit to stand transfer with Minimal Assistance - not met  3. Gait  x 30 feet with Minimal Assistance using AD if needed.- not met              Reasons for Discontinuation of Therapy Services  Transfer to alternate level of care.      Plan:  Patient Discharged to: son's house..

## 2017-02-13 ENCOUNTER — PATIENT OUTREACH (OUTPATIENT)
Dept: ADMINISTRATIVE | Facility: CLINIC | Age: 82
End: 2017-02-13
Payer: MEDICARE

## 2017-02-13 RX ORDER — ACETAMINOPHEN 325 MG/1
325 TABLET ORAL EVERY 6 HOURS PRN
COMMUNITY
End: 2017-03-30

## 2017-02-13 NOTE — PATIENT INSTRUCTIONS
What Is a Subarachnoid Hemorrhage (Stroke)?  A stroke is the interruption of blood flow to part of the brain. A subarachnoid hemorrhage is a type of stroke. A blood vessel on the surface of the brain bursts (hemorrhages). This spills blood into the surrounding tissue. This type of stroke often happens suddenly, with little warning. It is the most serious of all types of strokes.     During a Subarachnoid Hemorrhage  This type of stroke happens when a major blood vessel bursts on the surface of the brain. Normally, the space between the brain and the skull is filled with a clear fluid. When the blood vessel bursts, blood flows into the space between the brain and the skull. The amount of fluid in this space increases and puts pressure on the brain. This pressure can damage nearby parts of the brain.  Most of these strokes happen when a cerebral aneurysm bursts. An aneurysm is a weak spot in the wall of a blood vessel. A bubble often forms in this weak spot. In some cases, a cerebral aneurysm causes pain or other symptoms. In most cases, though, an aneurysm causes no symptoms until it bursts.  Symptoms of a Subarachnoid Hemorrhage  Any stroke is a medical emergency. If you have any of the following symptoms, even if they seem to get better, call 911 right away:  Sudden, excruciating headache with no known cause  Nausea and vomiting (often with headache)  Sudden decrease in alertness or confusion  Trouble moving or loss of feeling, especially on the face, arm, or leg specifically on 1 side of the body  Sudden trouble walking, dizziness, loss of balance or coordination  Sudden trouble talking or understanding speech   Sudden mood changes  Sudden dimness, double vision, or loss of vision, particularly in 1 eye  Eyes suddenly very sensitive to light  Neck and shoulder pain or stiff neck  Seizure  Treating Hemorrhagic Stroke  The acute phase lasts from the first minutes to hours after symptoms begin. During this phase,  treatment focuses on relieving pressure on the brain and preventing further damage. A procedure or surgery may be done to repair the burst aneurysm. A drain may be placed into the brain to relieve pressure. Medications to control blood pressure may be given through an IV line. Seizure medication is often given. Tests will likely be done to check for other aneurysms in the brain. If they are found, surgery may be done to reduce the risk that they will burst and bleed.  After the acute phase, treatment focuses on recovery. Long-term damage from a stroke can include paralysis, trouble speaking or understanding, and trouble thinking clearly. Rehabilitation therapy (rehab) can help reduce these effects and regain skills. Rehab begins in the hospital. It then continues in an inpatient or outpatient facility, and eventually at home. It will focus on regaining lost skills. It may include:  Help regaining movement.  Therapy for speech and language  Help with swallowing  Help reducing risk factors for another stroke, such as smoking  In addition, medications that help prevent another stroke may be given. These include medications to control blood pressure and prevent bleeding.  © 2491-4477 [employer]. [address] [place] [address], [place] [address]. All rights reserved. This information is not intended as a substitute for professional medical care. Always follow your healthcare professional's instructions.

## 2017-02-13 NOTE — PROGRESS NOTES
C3 nurse attempted to contact patient. No answer.  C3 nurse attempted to contact Oksana Estrada  for a TCC post hospital discharge follow up call. No answer at phone number listed and no voicemail available. The patient has a HOS appointment with Red Griffith NP  on 2/20 @ 1038. Message sent to Physician staff.NON [facility]

## 2017-02-13 NOTE — Clinical Note
Please forward this important TCC information to your provider in order to maximize the post discharge care delivery of this patient.  C3 nurse attempted to contact Oksana Estrada  for a TCC post hospital discharge follow up call. No answer. C3 nurse left a voice message and OOC # given. The patient has a scheduled HOSFU appointment with SASHA TRIANA NP on 2/20 @ 7860.  Respectfully, Chantelle Ambrose RN  [facility] C[facility]  [email or web address]  Please do not reply to this message, as this inbox is not routinely monitored.

## 2017-02-20 ENCOUNTER — OFFICE VISIT (OUTPATIENT)
Dept: FAMILY MEDICINE | Facility: CLINIC | Age: 82
End: 2017-02-20
Payer: MEDICARE

## 2017-02-20 VITALS
RESPIRATION RATE: 18 BRPM | HEIGHT: 68 IN | TEMPERATURE: 99 F | HEART RATE: 87 BPM | WEIGHT: 143.94 LBS | SYSTOLIC BLOOD PRESSURE: 130 MMHG | BODY MASS INDEX: 21.81 KG/M2 | OXYGEN SATURATION: 98 % | DIASTOLIC BLOOD PRESSURE: 62 MMHG

## 2017-02-20 DIAGNOSIS — S06.5XAA SUBDURAL HEMATOMA: ICD-10-CM

## 2017-02-20 DIAGNOSIS — F03.91 DEMENTIA WITH BEHAVIORAL DISTURBANCE, UNSPECIFIED DEMENTIA TYPE: ICD-10-CM

## 2017-02-20 DIAGNOSIS — H91.90 HEARING LOSS, UNSPECIFIED HEARING LOSS TYPE, UNSPECIFIED LATERALITY: ICD-10-CM

## 2017-02-20 DIAGNOSIS — S32.9XXS FRACTURE OF PELVIS, UNSPECIFIED PART OF PELVIS, SEQUELA: ICD-10-CM

## 2017-02-20 DIAGNOSIS — S42.031A: Primary | ICD-10-CM

## 2017-02-20 DIAGNOSIS — K57.30 DIVERTICULOSIS OF LARGE INTESTINE WITHOUT HEMORRHAGE: ICD-10-CM

## 2017-02-20 DIAGNOSIS — K59.09 OTHER CONSTIPATION: ICD-10-CM

## 2017-02-20 DIAGNOSIS — S09.90XD HEAD TRAUMA, SUBSEQUENT ENCOUNTER: ICD-10-CM

## 2017-02-20 PROCEDURE — 99203 OFFICE O/P NEW LOW 30 MIN: CPT | Mod: S$GLB,,, | Performed by: FAMILY MEDICINE

## 2017-02-20 RX ORDER — DOCUSATE SODIUM 100 MG/1
100 CAPSULE, LIQUID FILLED ORAL 2 TIMES DAILY
Qty: 100 CAPSULE | Refills: 11
Start: 2017-02-20 | End: 2017-03-30

## 2017-02-20 RX ORDER — QUETIAPINE FUMARATE 25 MG/1
25 TABLET, FILM COATED ORAL NIGHTLY
Qty: 30 TABLET | Refills: 11 | Status: ON HOLD | OUTPATIENT
Start: 2017-02-20 | End: 2018-03-21 | Stop reason: HOSPADM

## 2017-02-20 RX ORDER — DONEPEZIL HYDROCHLORIDE 5 MG/1
5 TABLET, FILM COATED ORAL NIGHTLY
Qty: 30 TABLET | Refills: 11 | Status: SHIPPED | OUTPATIENT
Start: 2017-02-20 | End: 2017-03-30 | Stop reason: SDUPTHER

## 2017-02-20 RX ORDER — TOLTERODINE TARTRATE 1 MG/1
1 TABLET, EXTENDED RELEASE ORAL 2 TIMES DAILY
COMMUNITY
End: 2017-02-20 | Stop reason: SDUPTHER

## 2017-02-20 RX ORDER — ALENDRONATE SODIUM 70 MG/1
70 TABLET ORAL
Qty: 13 TABLET | Refills: 3 | Status: ON HOLD | OUTPATIENT
Start: 2017-02-20 | End: 2018-03-21 | Stop reason: HOSPADM

## 2017-02-20 RX ORDER — TOLTERODINE TARTRATE 1 MG/1
1 TABLET, EXTENDED RELEASE ORAL 2 TIMES DAILY
Qty: 60 TABLET | Refills: 11 | Status: SHIPPED | OUTPATIENT
Start: 2017-02-20 | End: 2017-02-24

## 2017-02-20 RX ORDER — ATORVASTATIN CALCIUM 20 MG/1
20 TABLET, FILM COATED ORAL DAILY
Refills: 1 | COMMUNITY
Start: 2017-01-16 | End: 2017-03-30 | Stop reason: SDUPTHER

## 2017-02-20 NOTE — PROGRESS NOTES
Subjective:      Patient ID: Oksana Estrada is a 86 y.o. female.    Chief Complaint: Establish Care (establish care with PCP in the area, medications)    HPI Comments: Here with son; from [place]; fell at home 2 weeks ago; life alert; fx right shoulder, bled into brain; now with son here; has home health nurse, aid PT; was aphasic Saturday 2 minutes; memory getting worse.    Review of Systems   Constitutional: Negative.    HENT: Negative.    Respiratory: Negative.    Cardiovascular: Negative.    Gastrointestinal: Negative.    Endocrine: Negative.    Genitourinary: Negative.    Musculoskeletal: Positive for arthralgias.   Psychiatric/Behavioral: Positive for agitation, behavioral problems and dysphoric mood. Negative for hallucinations.   All other systems reviewed and are negative.    Objective:     Physical Exam   Constitutional: She is oriented to person, place, and time. She appears well-developed and well-nourished.   HENT:   Head: Normocephalic.   Eyes: Conjunctivae and EOM are normal. Pupils are equal, round, and reactive to light.   Neck: Normal range of motion. Neck supple.   Cardiovascular: Normal rate, regular rhythm and normal heart sounds.    Pulmonary/Chest: Effort normal and breath sounds normal.   Musculoskeletal: Normal range of motion.   Neurological: She is alert and oriented to person, place, and time. She has normal reflexes.   Skin: Skin is warm and dry.   Psychiatric: She has a normal mood and affect. Her behavior is normal. Judgment and thought content normal.   Nursing note and vitals reviewed.    Assessment:     1. Closed traumatic minimally displaced fracture of acromial end of right clavicle    2. Subdural hematoma    3. Head trauma, subsequent encounter    4. Fracture of pelvis, unspecified part of pelvis, sequela    5. Diverticulosis of large intestine without hemorrhage    6. Other constipation    7. Dementia with behavioral disturbance, unspecified dementia type    8. Hearing  loss, unspecified hearing loss type, unspecified laterality      Plan:     New Prescriptions    QUETIAPINE (SEROQUEL) 25 MG TAB    Take 1 tablet (25 mg total) by mouth every evening.     Discontinued Medications    LOVASTATIN (MEVACOR) 10 MG TABLET        TRAMADOL (ULTRAM) 50 MG TABLET    Take 1 tablet (50 mg total) by mouth every 6 (six) hours as needed for Pain.     Modified Medications    Modified Medication Previous Medication    ALENDRONATE (FOSAMAX) 70 MG TABLET alendronate (FOSAMAX) 70 MG tablet       Take 1 tablet (70 mg total) by mouth every 7 days.        DOCUSATE SODIUM (COLACE) 100 MG CAPSULE docusate sodium (COLACE) 50 MG capsule       Take 1 capsule (100 mg total) by mouth 2 (two) times daily.    Take 1 capsule (50 mg total) by mouth 2 (two) times daily.    TOLTERODINE (DETROL) 1 MG TAB tolterodine (DETROL) 1 MG Tab       Take 1 tablet (1 mg total) by mouth 2 (two) times daily.    Take 1 mg by mouth 2 (two) times daily.       Closed traumatic minimally displaced fracture of acromial end of right clavicle    Subdural hematoma    Head trauma, subsequent encounter    Fracture of pelvis, unspecified part of pelvis, sequela    Diverticulosis of large intestine without hemorrhage    Other constipation    Dementia with behavioral disturbance, unspecified dementia type    Hearing loss, unspecified hearing loss type, unspecified laterality    Other orders  -     alendronate (FOSAMAX) 70 MG tablet; Take 1 tablet (70 mg total) by mouth every 7 days.  Dispense: 13 tablet; Refill: 3  -     tolterodine (DETROL) 1 MG Tab; Take 1 tablet (1 mg total) by mouth 2 (two) times daily.  Dispense: 60 tablet; Refill: 11  -     docusate sodium (COLACE) 100 MG capsule; Take 1 capsule (100 mg total) by mouth 2 (two) times daily.  Dispense: 100 capsule; Refill: 11  -     quetiapine (SEROQUEL) 25 MG Tab; Take 1 tablet (25 mg total) by mouth every evening.  Dispense: 30 tablet; Refill: 11

## 2017-02-22 ENCOUNTER — HOSPITAL ENCOUNTER (OUTPATIENT)
Dept: RADIOLOGY | Facility: HOSPITAL | Age: 82
Discharge: HOME | End: 2017-02-22
Payer: MEDICARE

## 2017-02-22 ENCOUNTER — TELEPHONE (OUTPATIENT)
Dept: FAMILY MEDICINE | Facility: CLINIC | Age: 82
End: 2017-02-22

## 2017-02-22 ENCOUNTER — OFFICE VISIT (OUTPATIENT)
Dept: NEUROSURGERY | Facility: CLINIC | Age: 82
End: 2017-02-22
Payer: MEDICARE

## 2017-02-22 ENCOUNTER — HOSPITAL ENCOUNTER (OUTPATIENT)
Dept: RADIOLOGY | Facility: HOSPITAL | Age: 82
Discharge: HOME | End: 2017-02-22
Attending: ORTHOPAEDIC SURGERY
Payer: MEDICARE

## 2017-02-22 ENCOUNTER — OFFICE VISIT (OUTPATIENT)
Dept: ORTHOPEDICS | Facility: CLINIC | Age: 82
End: 2017-02-22
Payer: MEDICARE

## 2017-02-22 VITALS
WEIGHT: 143.06 LBS | HEART RATE: 65 BPM | DIASTOLIC BLOOD PRESSURE: 77 MMHG | RESPIRATION RATE: 16 BRPM | HEIGHT: 68 IN | BODY MASS INDEX: 21.68 KG/M2 | SYSTOLIC BLOOD PRESSURE: 117 MMHG

## 2017-02-22 VITALS
WEIGHT: 143 LBS | HEART RATE: 65 BPM | TEMPERATURE: 98 F | SYSTOLIC BLOOD PRESSURE: 117 MMHG | DIASTOLIC BLOOD PRESSURE: 77 MMHG | BODY MASS INDEX: 21.67 KG/M2 | HEIGHT: 68 IN

## 2017-02-22 DIAGNOSIS — S32.599A: ICD-10-CM

## 2017-02-22 DIAGNOSIS — S06.5XAA SUBDURAL HEMATOMA: Primary | ICD-10-CM

## 2017-02-22 DIAGNOSIS — S42.031A: ICD-10-CM
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
## 2017-02-22 DIAGNOSIS — S42.031A: Primary | ICD-10-CM

## 2017-02-22 DIAGNOSIS — S06.5XAA SUBDURAL HEMATOMA: ICD-10-CM

## 2017-02-22 PROCEDURE — 73000 X-RAY EXAM OF COLLAR BONE: CPT | Mod: 26,RT,, | Performed by: RADIOLOGY

## 2017-02-22 PROCEDURE — 99213 OFFICE O/P EST LOW 20 MIN: CPT | Mod: PBBFAC,27 | Performed by: PHYSICIAN ASSISTANT

## 2017-02-22 PROCEDURE — 72190 X-RAY EXAM OF PELVIS: CPT | Mod: 26,GC,, | Performed by: RADIOLOGY

## 2017-02-22 PROCEDURE — 99213 OFFICE O/P EST LOW 20 MIN: CPT | Mod: S$PBB,,, | Performed by: PHYSICIAN ASSISTANT

## 2017-02-22 PROCEDURE — 99999 PR PBB SHADOW E&M-EST. PATIENT-LVL III: CPT | Mod: PBBFAC,,, | Performed by: PHYSICIAN ASSISTANT

## 2017-02-22 PROCEDURE — 70450 CT HEAD/BRAIN W/O DYE: CPT | Mod: 26,GC,, | Performed by: RADIOLOGY

## 2017-02-22 NOTE — LETTER
February 22, 2017      Red Griffith MD  [address] [facility] [address]           [facility] - Neurosurgery 7th Fl  [address]  [facility] [address]  Phone: [phone]          Patient: Oksana Estrada   MR Number: [id number]   YOB: 1930   Date of Visit: 2/22/2017       Dear Dr. Red Griffith:    Thank you for referring Oksana Estrada to me for evaluation. Attached you will find relevant portions of my assessment and plan of care.    If you have questions, please do not hesitate to call me. I look forward to following Oksana Estrada along with you.    Sincerely,    LAURA Puga    Enclosure  CC:  No Recipients    If you would like to receive this communication electronically, please contact [email or web address] or [phone] to request more information on [email or web address] Link access.    For providers and/or their staff who would like to refer a patient to [facility], please contact us through our one-stop-shop provider referral line, Mukul Lott, at [phone].    If you feel you have received this communication in error or would no longer like to receive these types of communications, please e-mail [email or web address]

## 2017-02-22 NOTE — PROGRESS NOTES
[facility] - Neurosurgery 7th Fl  Established Patient  Neurosurgery    SUBJECTIVE:     Chief Complaint/Reason for Admission: FU SDH    History of Present Illness:  Ms. Estrada is an 86 y.o. female who was transferred to [facility] [place] [place] on 2/6/17 for management of a SDH that occurred after a mechanical fall. She did not have a history of chronic anti coagulation. Patient presents to clinic today with her son. She states that since discharge from the hospital on 2/9/17, she has been doing well. Denies any recent falls. Denies headache, dizziness, N/V, vision changes, weakness or incontinence. Son denies increased confusion or lethargy. States patient is at her baseline.         (Not in a hospital admission)    Review of patient's allergies indicates:   Allergen Reactions    Penicillins        Past Medical History   Diagnosis Date    Dementia     Depression     Hypertension     Stroke      Past Surgical History   Procedure Laterality Date    Breast surgery       History reviewed. No pertinent family history.  Social History   Substance Use Topics    Smoking status: Former Smoker     Types: Cigarettes    Smokeless tobacco: None      Comment: quit 25 years ago    Alcohol use No        OBJECTIVE:     Vital Signs (Most Recent):  Temp: 97.9 °F (36.6 °C) (02/22/17 0927)  Pulse: 65 (02/22/17 0927)  BP: 117/77 (02/22/17 0927)    Physical Exam:  General: well developed, well nourished, no distress.   Head: normocephalic, atraumatic  Neurologic: Alert and oriented. Thought content appropriate.  GCS: Motor: 6/Verbal: 5/Eyes: 4 GCS Total: 15  Mental Status: Awake, Alert, Oriented x 4  Language: No aphasia  Speech: No dysarthria  Cranial nerves: face symmetric, tongue midline, CN II-XII grossly intact.   Eyes: pupils equal, round, reactive to light with accomodation, EOMI.   Pulmonary: normal respirations, no signs of respiratory distress  Abdomen: soft, non-distended, not tender to palpation  Sensory: intact to light touch  throughout  Motor Strength:Moves all extremities spontaneously with good tone. Generalized deconditioning. Upper and lower extremities 5-/5. No abnormal movements seen.   Pronator Drift: no drift noted  Finger-to-nose: Intact bilaterally  Engle: absent  Clonus: absent  2+ pitting edema in BLE  Skin: Skin is warm, dry and intact.  Gait: not assessed; in wheel chair        Diagnostic Results:  Head CT:  I independently reviewed the imaging.     Expected interval temporal temporal evolution of the small right frontal subdural hematoma.    Mild generalized cerebral volume loss with chronic microvascular ischemic changes.    ASSESSMENT/PLAN:     Assessment:  Ms. Estrada is an 86 year old female s/p mechanical fall, with a small right frontal SDH.    Plan:  -Patient neurologically stable on exam  -Head CT shows decrease in the size of the SDH; almost resolved. No evidence of new hemorrhage.   -Continue fall precautions  -No medication restrictions  -Return to clinic PRN new or worsening symptoms  -Encouraged patient and her son to call the clinic with any questions or concerns       Dee Nguyen PA-C   Neurosurgery   Pager: [phone]

## 2017-02-23 NOTE — PROGRESS NOTES
Subjective:      Patient ID: Oksana Estrada is a 86 y.o. female.    Chief Complaint: Clavicle Injury (right clavicle)    HPI    Patient is a 86 year old female who presents to clinic for follow up from hospital orthopedic consult for left distal clavicle fracture and left inferior pubic ramus fracture( well healed, no acute fracture, patient reports that this is 12 year old) that occurred on  02/06/2017 due to all from standing height. Patients shoulder has been treated non-operatively. She is at home. She is receiving therapy. She stated that she is doing very well and has no pain. Patient denied numbness or tingling.     Review of Systems   Constitution: Negative for chills and fever.   Cardiovascular: Negative for chest pain.   Respiratory: Negative for cough and shortness of breath.    Skin: Negative for color change, dry skin, itching, nail changes, poor wound healing and rash.   Musculoskeletal:        Left distal clavicle fracture.    Neurological: Negative for dizziness.   Psychiatric/Behavioral: Negative for altered mental status. The patient is not nervous/anxious.    All other systems reviewed and are negative.        Objective:      General    Constitutional: She is oriented to person, place, and time. She appears well-developed and well-nourished. No distress.   HENT:   Head: Atraumatic.   Eyes: Conjunctivae are normal.   Cardiovascular: Normal rate.    Pulmonary/Chest: Effort normal.   Neurological: She is alert and oriented to person, place, and time.   Psychiatric: She has a normal mood and affect. Her behavior is normal.             Left Shoulder Exam     Inspection/Observation   Swelling: present  Bruising: present    Tenderness   The patient is experiencing no tenderness.         Range of Motion   Active Abduction:  80 abnormal   Forward Flexion: 100   Forward Elevation: abnormal  Adduction: normal  External Rotation 0 degrees: abnormal   External Rotation 90 degrees: abnormal  Internal  Rotation 0 degrees: abnormal   Internal Rotation 90 degrees: abnormal     Comments:  No tenting of skin.   Though range of motion normal it is similar to other side.       Vascular Exam       Left Pulses      Radial:                    2+      Capillary Refill  Left Hand: normal capillary refill    RADS: healing distal RIGHT clavicle fracture      Assessment:       Encounter Diagnoses   Name Primary?    Closed traumatic minimally displaced fracture of acromial end of right clavicle Yes    Fracture of multiple pubic rami, unspecified laterality, closed, initial encounter           Plan:       Discussed treatment plan with patient and family.   - non-operative treatment.   - Continue PT/OT   - range of motion as tolerated.    - avoid lifting pushing pulling.   - pain medication: no refill needed.   - return to clinic in 6 weeks at time x-ray of her clavicle is needed.

## 2017-03-02 NOTE — TELEPHONE ENCOUNTER
Patient requesting refills    1. Escitalopram  20 MG daily         #30 to [facility]                   and        #90 to [facility]    2. Patient is taking Tolterodine 1 mg 1 BID to [facility]  ** change this medication **        per our discussion     Notify pt son once complete  Michele   [phone]

## 2017-03-05 RX ORDER — ESCITALOPRAM OXALATE 20 MG/1
20 TABLET ORAL DAILY
Qty: 90 TABLET | Refills: 3 | Status: ON HOLD | OUTPATIENT
Start: 2017-03-05 | End: 2018-03-21 | Stop reason: HOSPADM

## 2017-03-14 ENCOUNTER — TELEPHONE (OUTPATIENT)
Dept: FAMILY MEDICINE | Facility: CLINIC | Age: 82
End: 2017-03-14

## 2017-03-14 NOTE — TELEPHONE ENCOUNTER
----- Message from Dora Vitale sent at 3/14/2017  4:04 PM CDT -----  Contact: Michele pink's son/ [phone]  Patient's son states patient has had diarrhea for over a week off and on. Patient has been taking imodium but it's not making the problem goes away then it comes right back in a day or so, after taking the medication. Please send something to [facility] in [place]

## 2017-03-15 RX ORDER — DIPHENOXYLATE HYDROCHLORIDE AND ATROPINE SULFATE 2.5; .025 MG/1; MG/1
1 TABLET ORAL 4 TIMES DAILY PRN
Qty: 20 TABLET | Refills: 0 | Status: SHIPPED | OUTPATIENT
Start: 2017-03-15 | End: 2017-03-25

## 2017-03-30 ENCOUNTER — OFFICE VISIT (OUTPATIENT)
Dept: FAMILY MEDICINE | Facility: CLINIC | Age: 82
End: 2017-03-30
Payer: MEDICARE

## 2017-03-30 VITALS
TEMPERATURE: 98 F | BODY MASS INDEX: 23.49 KG/M2 | RESPIRATION RATE: 18 BRPM | SYSTOLIC BLOOD PRESSURE: 118 MMHG | HEIGHT: 67 IN | DIASTOLIC BLOOD PRESSURE: 70 MMHG | HEART RATE: 75 BPM | WEIGHT: 149.69 LBS | OXYGEN SATURATION: 98 %

## 2017-03-30 DIAGNOSIS — K58.0 IRRITABLE BOWEL SYNDROME WITH DIARRHEA: Primary | ICD-10-CM

## 2017-03-30 PROCEDURE — 99213 OFFICE O/P EST LOW 20 MIN: CPT | Mod: S$GLB,,, | Performed by: FAMILY MEDICINE

## 2017-03-30 RX ORDER — CHLORDIAZEPOXIDE HYDROCHLORIDE AND CLIDINIUM BROMIDE 5; 2.5 MG/1; MG/1
1 CAPSULE ORAL 3 TIMES DAILY PRN
Qty: 30 CAPSULE | Refills: 0 | Status: ON HOLD | OUTPATIENT
Start: 2017-03-30 | End: 2018-03-21 | Stop reason: HOSPADM

## 2017-03-30 RX ORDER — LISINOPRIL 20 MG/1
20 TABLET ORAL DAILY
Qty: 90 TABLET | Refills: 3 | Status: ON HOLD | OUTPATIENT
Start: 2017-03-30 | End: 2018-03-21 | Stop reason: HOSPADM

## 2017-03-30 RX ORDER — TOLTERODINE TARTRATE 1 MG/1
1 TABLET, EXTENDED RELEASE ORAL 2 TIMES DAILY
Qty: 180 TABLET | Refills: 3 | Status: ON HOLD | OUTPATIENT
Start: 2017-03-30 | End: 2018-03-21 | Stop reason: HOSPADM

## 2017-03-30 RX ORDER — DONEPEZIL HYDROCHLORIDE 5 MG/1
5 TABLET, FILM COATED ORAL NIGHTLY
Qty: 90 TABLET | Refills: 3 | Status: ON HOLD | OUTPATIENT
Start: 2017-03-30 | End: 2018-03-21 | Stop reason: HOSPADM

## 2017-03-30 RX ORDER — CHOLESTYRAMINE 4 G/4.8G
1 POWDER, FOR SUSPENSION ORAL 2 TIMES DAILY
Qty: 30 PACKET | Refills: 1 | Status: ON HOLD | OUTPATIENT
Start: 2017-03-30 | End: 2018-03-21 | Stop reason: HOSPADM

## 2017-03-30 RX ORDER — ATORVASTATIN CALCIUM 20 MG/1
20 TABLET, FILM COATED ORAL DAILY
Qty: 90 TABLET | Refills: 3 | Status: ON HOLD | OUTPATIENT
Start: 2017-03-30 | End: 2018-03-21 | Stop reason: HOSPADM

## 2017-03-30 RX ORDER — OXYBUTYNIN CHLORIDE 5 MG/1
5 TABLET ORAL DAILY
Qty: 90 TABLET | Refills: 3 | Status: CANCELLED | OUTPATIENT
Start: 2017-03-30 | End: 2018-03-30

## 2017-03-30 NOTE — MR AVS SNAPSHOT
[facility]  [address] [address]  Phone: [phone]  Fax: [phone]                  Oksana Estrada   3/30/2017 8:00 AM   Office Visit    Description:  Female : 10/6/1930   Provider:  Red Griffith MD   Department:  [facility]           Reason for Visit     Diarrhea           Diagnoses this Visit        Comments    Irritable bowel syndrome with diarrhea    -  Primary            To Do List           Goals (5 Years of Data)     None      Follow-Up and Disposition     Return in about 3 months (around 2017).       These Medications        Disp Refills Start End    tolterodine (DETROL) 1 MG Tab 180 tablet 3 3/30/2017 3/30/2018    Take 1 tablet (1 mg total) by mouth 2 (two) times daily. - Oral    Pharmacy: [facility] [address] [facility] AT [facility] Ph #: [phone]       lisinopril (PRINIVIL,ZESTRIL) 20 MG tablet 90 tablet 3 3/30/2017     Take 1 tablet (20 mg total) by mouth once daily. - Oral    Pharmacy: [facility] [address] [facility] AT [facility] Ph #: [phone]       atorvastatin (LIPITOR) 20 MG tablet 90 tablet 3 3/30/2017     Take 1 tablet (20 mg total) by mouth once daily. - Oral    Pharmacy: [facility] [place] [address] [facility] AT [facility] Ph #: [phone]       donepezil (ARICEPT) 5 MG tablet 90 tablet 3 3/30/2017 3/30/2018    Take 1 tablet (5 mg total) by mouth every evening. For memory - Oral    Pharmacy: [facility] [address] [place] [address] [address] [facility] AT [facility] Ph #: [phone]       cholestyramine-aspartame (QUESTRAN LIGHT) 4 gram PwPk 30 packet 1 3/30/2017 3/30/2018    Take 1 packet (4 g total) by mouth 2 (two) times daily. As needed for diarrhea - Oral    Pharmacy: [facility] [address] [place] [address] [address] [place] [facility] Ph #: [phone]       chlordiazepoxide-clidinium 5-2.5 mg (LIBRAX) 5-2.5 mg Cap 30 capsule 0 3/30/2017     Take 1 capsule by mouth 3 (three) times daily as needed. For irritable bowel syndrome - Oral    Pharmacy: [facility] [id number] [place] [address] [address] AT [facility] of [facility] Ph #: [phone]         [facility] On Call     [facility] On Call Nurse Care Line - 24/7 Assistance  Unless otherwise directed by your provider, please contact [facility], our nurse care line that is available for 24/7 assistance.     Registered nurses in the [facility] Center provide: appointment scheduling, clinical advisement, health education, and other advisory services.  Call: [phone] (toll free)               Medications           Message regarding Medications     Verify the changes and/or additions to your medication regime listed below are the same as discussed with your clinician today.  If any of these changes or additions are incorrect, please notify your healthcare provider.        START taking these NEW medications        Refills    tolterodine (DETROL) 1 MG Tab 3    Sig: Take 1 tablet (1 mg total) by mouth 2 (two) times daily.    Class: Normal    Route: Oral    cholestyramine-aspartame (QUESTRAN LIGHT) 4 gram PwPk 1    Sig: Take 1 packet (4 g total) by mouth 2 (two) times daily. As needed for diarrhea    Class: Normal    Route: Oral    chlordiazepoxide-clidinium 5-2.5 mg (LIBRAX) 5-2.5 mg Cap 0    Sig: Take 1 capsule by mouth 3 (three) times daily as needed. For irritable bowel syndrome    Class: Print    Route: Oral      CHANGE how you are taking these medications     Start Taking Instead of    lisinopril (PRINIVIL,ZESTRIL) 20 MG tablet lisinopril (PRINIVIL,ZESTRIL) 20 MG tablet    Dosage:  Take 1 tablet (20 mg total) by mouth once daily. Dosage:  TAKE 1 TABLET DAILY FOR    HYPERTENSION    Reason for Change:  Reorder     atorvastatin (LIPITOR) 20 MG tablet  atorvastatin (LIPITOR) 20 MG tablet    Dosage:  Take 1 tablet (20 mg total) by mouth once daily. Dosage:  Take 20 mg by mouth once daily.    Reason for Change:  Reorder     donepezil (ARICEPT) 5 MG tablet donepezil (ARICEPT) 5 MG tablet    Dosage:  Take 1 tablet (5 mg total) by mouth every evening. For memory Dosage:  Take 1 tablet (5 mg total) by mouth every evening.    Reason for Change:  Reorder       STOP taking these medications     acetaminophen (TYLENOL) 325 MG tablet Take 325 mg by mouth every 6 (six) hours as needed for Pain.    docusate sodium (COLACE) 100 MG capsule Take 1 capsule (100 mg total) by mouth 2 (two) times daily.    oxybutynin (DITROPAN) 5 MG Tab Take 1 tablet (5 mg total) by mouth once daily. For bladder           Verify that the below list of medications is an accurate representation of the medications you are currently taking.  If none reported, the list may be blank. If incorrect, please contact your healthcare provider. Carry this list with you in case of emergency.           Current Medications     alendronate (FOSAMAX) 70 MG tablet Take 1 tablet (70 mg total) by mouth every 7 days.    atorvastatin (LIPITOR) 20 MG tablet Take 1 tablet (20 mg total) by mouth once daily.    chlordiazepoxide-clidinium 5-2.5 mg (LIBRAX) 5-2.5 mg Cap Take 1 capsule by mouth 3 (three) times daily as needed. For irritable bowel syndrome    cholestyramine-aspartame (QUESTRAN LIGHT) 4 gram PwPk Take 1 packet (4 g total) by mouth 2 (two) times daily. As needed for diarrhea    donepezil (ARICEPT) 5 MG tablet Take 1 tablet (5 mg total) by mouth every evening. For memory    escitalopram oxalate (LEXAPRO) 20 MG tablet Take 1 tablet (20 mg total) by mouth once daily.    lisinopril (PRINIVIL,ZESTRIL) 20 MG tablet Take 1 tablet (20 mg total) by mouth once daily.    quetiapine (SEROQUEL) 25 MG Tab Take 1 tablet (25 mg total) by mouth every evening.    tolterodine (DETROL) 1 MG Tab Take 1 tablet (1 mg total) by mouth 2  "(two) times daily.           Clinical Reference Information           Your Vitals Were     BP Pulse Temp Resp Height Weight    118/70 (BP Location: Right arm, Patient Position: Sitting, BP Method: Manual) 75 98.4 °F (36.9 °C) (Oral) 18 5' 7" (1.702 m) 67.9 kg (149 lb 11.1 oz)    SpO2 BMI             98% 23.45 kg/m2         Blood Pressure          Most Recent Value    BP  118/70      Allergies as of 3/30/2017     Penicillins      Immunizations Administered on Date of Encounter - 3/30/2017     None      Language Assistance Services     ATTENTION: Language assistance services are available, free of charge. Please call [phone].      ATENCIÓN: Si rositala neri, tiene a teran disposición servicios gratuitos de asistencia lingüística. Llame al [phone].     ISRRAEL Ý: N?u b?n nói Ti?ng Vi?t, có các d?ch v? h? tr? ngôn ng? mi?n phí dành cho b?n. G?i s? [phone].         [facility] complies with applicable Federal civil rights laws and does not discriminate on the basis of race, color, national origin, age, disability, or sex.        "

## 2017-03-30 NOTE — PROGRESS NOTES
Subjective:      Patient ID: Oksana Estrada is a 86 y.o. female.    Chief Complaint: Diarrhea (medication prescribed didn't work.)    HPI Comments: Diarrhea for 1.5 weeks; no blood, pain; has had in past; suspect Irritable bowel due to change in living situation; needs refills; lomotil no help     Diarrhea        Review of Systems   Constitutional: Negative.    HENT: Negative.    Respiratory: Negative.    Cardiovascular: Negative.    Gastrointestinal: Positive for diarrhea.   Endocrine: Negative.    Genitourinary: Negative.    Musculoskeletal: Negative.    Psychiatric/Behavioral: Positive for confusion.   All other systems reviewed and are negative.    Objective:     Physical Exam   Constitutional: She is oriented to person, place, and time. She appears well-developed and well-nourished.   HENT:   Head: Normocephalic.   Eyes: Conjunctivae and EOM are normal. Pupils are equal, round, and reactive to light.   Neck: Normal range of motion. Neck supple.   Cardiovascular: Normal rate, regular rhythm and normal heart sounds.    Pulmonary/Chest: Effort normal and breath sounds normal.   Musculoskeletal: Normal range of motion.   Neurological: She is alert and oriented to person, place, and time. She has normal reflexes.   Skin: Skin is warm and dry.   Psychiatric: She has a normal mood and affect. Her behavior is normal. Judgment and thought content normal.   Nursing note and vitals reviewed.    Assessment:     1. Irritable bowel syndrome with diarrhea      Plan:     New Prescriptions    CHLORDIAZEPOXIDE-CLIDINIUM 5-2.5 MG (LIBRAX) 5-2.5 MG CAP    Take 1 capsule by mouth 3 (three) times daily as needed. For irritable bowel syndrome    CHOLESTYRAMINE-ASPARTAME (QUESTRAN LIGHT) 4 GRAM PWPK    Take 1 packet (4 g total) by mouth 2 (two) times daily. As needed for diarrhea    TOLTERODINE (DETROL) 1 MG TAB    Take 1 tablet (1 mg total) by mouth 2 (two) times daily.     Discontinued Medications    ACETAMINOPHEN (TYLENOL)  325 MG TABLET    Take 325 mg by mouth every 6 (six) hours as needed for Pain.    DOCUSATE SODIUM (COLACE) 100 MG CAPSULE    Take 1 capsule (100 mg total) by mouth 2 (two) times daily.    OXYBUTYNIN (DITROPAN) 5 MG TAB    Take 1 tablet (5 mg total) by mouth once daily. For bladder     Modified Medications    Modified Medication Previous Medication    ATORVASTATIN (LIPITOR) 20 MG TABLET atorvastatin (LIPITOR) 20 MG tablet       Take 1 tablet (20 mg total) by mouth once daily.    Take 20 mg by mouth once daily.    DONEPEZIL (ARICEPT) 5 MG TABLET donepezil (ARICEPT) 5 MG tablet       Take 1 tablet (5 mg total) by mouth every evening. For memory    Take 1 tablet (5 mg total) by mouth every evening.    LISINOPRIL (PRINIVIL,ZESTRIL) 20 MG TABLET lisinopril (PRINIVIL,ZESTRIL) 20 MG tablet       Take 1 tablet (20 mg total) by mouth once daily.    TAKE 1 TABLET DAILY FOR    HYPERTENSION       Irritable bowel syndrome with diarrhea    Other orders  -     tolterodine (DETROL) 1 MG Tab; Take 1 tablet (1 mg total) by mouth 2 (two) times daily.  Dispense: 180 tablet; Refill: 3  -     lisinopril (PRINIVIL,ZESTRIL) 20 MG tablet; Take 1 tablet (20 mg total) by mouth once daily.  Dispense: 90 tablet; Refill: 3  -     atorvastatin (LIPITOR) 20 MG tablet; Take 1 tablet (20 mg total) by mouth once daily.  Dispense: 90 tablet; Refill: 3  -     Cancel: oxybutynin (DITROPAN) 5 MG Tab; Take 1 tablet (5 mg total) by mouth once daily. For bladder  Dispense: 90 tablet; Refill: 3  -     donepezil (ARICEPT) 5 MG tablet; Take 1 tablet (5 mg total) by mouth every evening. For memory  Dispense: 90 tablet; Refill: 3  -     cholestyramine-aspartame (QUESTRAN LIGHT) 4 gram PwPk; Take 1 packet (4 g total) by mouth 2 (two) times daily. As needed for diarrhea  Dispense: 30 packet; Refill: 1  -     chlordiazepoxide-clidinium 5-2.5 mg (LIBRAX) 5-2.5 mg Cap; Take 1 capsule by mouth 3 (three) times daily as needed. For irritable bowel syndrome  Dispense: 30  capsule; Refill: 0

## 2017-09-25 ENCOUNTER — TELEPHONE (OUTPATIENT)
Dept: FAMILY MEDICINE | Facility: CLINIC | Age: 82
End: 2017-09-25

## 2017-09-25 DIAGNOSIS — F03.90 DEMENTIA WITHOUT BEHAVIORAL DISTURBANCE, UNSPECIFIED DEMENTIA TYPE: ICD-10-CM

## 2017-09-25 DIAGNOSIS — I63.9 CEREBROVASCULAR ACCIDENT (CVA), UNSPECIFIED MECHANISM: ICD-10-CM

## 2017-09-25 DIAGNOSIS — S06.5XAA SUBDURAL HEMATOMA: Primary | ICD-10-CM

## 2017-09-25 NOTE — TELEPHONE ENCOUNTER
----- Message from Saumya Wilson sent at 9/25/2017 10:05 AM CDT -----  Contact: son Michele  Patient currently living in skilled nursing community. Son would like  to come out for a week or so just to help her get settled in with the transition.    Address: [facility]  [address] [place] [address]  [phone] ()

## 2017-10-19 ENCOUNTER — HOSPITAL ENCOUNTER (OUTPATIENT)
Facility: HOSPITAL | Age: 82
Discharge: HOME | End: 2017-10-20
Attending: EMERGENCY MEDICINE | Admitting: INTERNAL MEDICINE
Payer: MEDICARE
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
## 2017-10-19 DIAGNOSIS — F01.50 VASCULAR DEMENTIA WITHOUT BEHAVIORAL DISTURBANCE: ICD-10-CM

## 2017-10-19 DIAGNOSIS — Z86.73 HISTORY OF CVA (CEREBROVASCULAR ACCIDENT): ICD-10-CM

## 2017-10-19 DIAGNOSIS — E86.0 DEHYDRATION: ICD-10-CM

## 2017-10-19 DIAGNOSIS — R53.1 WEAKNESS: ICD-10-CM

## 2017-10-19 DIAGNOSIS — I21.4 NSTEMI (NON-ST ELEVATED MYOCARDIAL INFARCTION): ICD-10-CM

## 2017-10-19 DIAGNOSIS — R19.7 DIARRHEA, UNSPECIFIED TYPE: ICD-10-CM

## 2017-10-19 DIAGNOSIS — R79.89 ELEVATED TROPONIN: ICD-10-CM

## 2017-10-19 DIAGNOSIS — R60.0 PERIPHERAL EDEMA: ICD-10-CM

## 2017-10-19 PROBLEM — R23.4 FISSURE IN SKIN OF FOOT: Status: ACTIVE | Noted: 2017-10-19

## 2017-10-19 LAB
ALBUMIN SERPL BCP-MCNC: 3.5 G/DL
ALP SERPL-CCNC: 111 U/L
ALT SERPL W/O P-5'-P-CCNC: 29 U/L
ANION GAP SERPL CALC-SCNC: 11 MMOL/L
AST SERPL-CCNC: 26 U/L
BASOPHILS # BLD AUTO: 0.01 K/UL
BASOPHILS NFR BLD: 0.1 %
BILIRUB SERPL-MCNC: 0.5 MG/DL
BNP SERPL-MCNC: 365 PG/ML
BUN SERPL-MCNC: 16 MG/DL
CALCIUM SERPL-MCNC: 9 MG/DL
CHLORIDE SERPL-SCNC: 103 MMOL/L
CK MB SERPL-MCNC: 5 NG/ML
CK MB SERPL-RTO: 1.3 %
CK SERPL-CCNC: 392 U/L
CO2 SERPL-SCNC: 26 MMOL/L
CREAT SERPL-MCNC: 0.9 MG/DL
DIASTOLIC DYSFUNCTION: YES
DIFFERENTIAL METHOD: ABNORMAL
EOSINOPHIL # BLD AUTO: 0 K/UL
EOSINOPHIL NFR BLD: 0 %
ERYTHROCYTE [DISTWIDTH] IN BLOOD BY AUTOMATED COUNT: 15.3 %
EST. GFR  (AFRICAN AMERICAN): >60 ML/MIN/1.73 M^2
EST. GFR  (NON AFRICAN AMERICAN): 58 ML/MIN/1.73 M^2
ESTIMATED PA SYSTOLIC PRESSURE: 43.09
GLOBAL PERICARDIAL EFFUSION: ABNORMAL
GLUCOSE SERPL-MCNC: 106 MG/DL
HCT VFR BLD AUTO: 34.8 %
HGB BLD-MCNC: 11.8 G/DL
LACTATE SERPL-SCNC: 2.1 MMOL/L
LYMPHOCYTES # BLD AUTO: 0.1 K/UL
LYMPHOCYTES NFR BLD: 1.1 %
MCH RBC QN AUTO: 31.7 PG
MCHC RBC AUTO-ENTMCNC: 33.9 G/DL
MCV RBC AUTO: 94 FL
MITRAL VALVE MOBILITY: NORMAL
MITRAL VALVE REGURGITATION: ABNORMAL
MONOCYTES # BLD AUTO: 0.3 K/UL
MONOCYTES NFR BLD: 2.7 %
NEUTROPHILS # BLD AUTO: 10.1 K/UL
NEUTROPHILS NFR BLD: 96.1 %
PLATELET # BLD AUTO: 222 K/UL
PMV BLD AUTO: 10.7 FL
POTASSIUM SERPL-SCNC: 3.8 MMOL/L
PROT SERPL-MCNC: 6.8 G/DL
RBC # BLD AUTO: 3.72 M/UL
RETIRED EF AND QEF - SEE NOTES: 50 (ref 55–65)
SODIUM SERPL-SCNC: 140 MMOL/L
TRICUSPID VALVE REGURGITATION: ABNORMAL
TROPONIN I SERPL DL<=0.01 NG/ML-MCNC: 0.02 NG/ML
TROPONIN I SERPL DL<=0.01 NG/ML-MCNC: 0.04 NG/ML
WBC # BLD AUTO: 10.48 K/UL

## 2017-10-19 PROCEDURE — 80053 COMPREHEN METABOLIC PANEL: CPT

## 2017-10-19 PROCEDURE — 93005 ELECTROCARDIOGRAM TRACING: CPT

## 2017-10-19 PROCEDURE — 83880 ASSAY OF NATRIURETIC PEPTIDE: CPT

## 2017-10-19 PROCEDURE — G0378 HOSPITAL OBSERVATION PER HR: HCPCS

## 2017-10-19 PROCEDURE — 84484 ASSAY OF TROPONIN QUANT: CPT

## 2017-10-19 PROCEDURE — 84484 ASSAY OF TROPONIN QUANT: CPT | Mod: 91

## 2017-10-19 PROCEDURE — 83605 ASSAY OF LACTIC ACID: CPT

## 2017-10-19 PROCEDURE — 87040 BLOOD CULTURE FOR BACTERIA: CPT | Mod: 59

## 2017-10-19 PROCEDURE — 25000003 PHARM REV CODE 250: Performed by: NURSE PRACTITIONER

## 2017-10-19 PROCEDURE — 93306 TTE W/DOPPLER COMPLETE: CPT

## 2017-10-19 PROCEDURE — 96360 HYDRATION IV INFUSION INIT: CPT

## 2017-10-19 PROCEDURE — 82553 CREATINE MB FRACTION: CPT

## 2017-10-19 PROCEDURE — 96361 HYDRATE IV INFUSION ADD-ON: CPT

## 2017-10-19 PROCEDURE — 25000003 PHARM REV CODE 250: Performed by: EMERGENCY MEDICINE

## 2017-10-19 PROCEDURE — 85025 COMPLETE CBC W/AUTO DIFF WBC: CPT

## 2017-10-19 PROCEDURE — 99285 EMERGENCY DEPT VISIT HI MDM: CPT | Mod: 25

## 2017-10-19 PROCEDURE — 93010 ELECTROCARDIOGRAM REPORT: CPT | Mod: ,,, | Performed by: INTERNAL MEDICINE

## 2017-10-19 PROCEDURE — 93306 TTE W/DOPPLER COMPLETE: CPT | Mod: 26,,, | Performed by: INTERNAL MEDICINE

## 2017-10-19 PROCEDURE — 63600175 PHARM REV CODE 636 W HCPCS: Performed by: NURSE PRACTITIONER

## 2017-10-19 PROCEDURE — 36415 COLL VENOUS BLD VENIPUNCTURE: CPT

## 2017-10-19 RX ORDER — SODIUM CHLORIDE 9 MG/ML
1000 INJECTION, SOLUTION INTRAVENOUS
Status: COMPLETED | OUTPATIENT
Start: 2017-10-19 | End: 2017-10-19

## 2017-10-19 RX ORDER — SODIUM CHLORIDE 9 MG/ML
INJECTION, SOLUTION INTRAVENOUS CONTINUOUS
Status: DISCONTINUED | OUTPATIENT
Start: 2017-10-19 | End: 2017-10-19

## 2017-10-19 RX ORDER — OXYBUTYNIN CHLORIDE 5 MG/1
5 TABLET, EXTENDED RELEASE ORAL DAILY
Status: DISCONTINUED | OUTPATIENT
Start: 2017-10-20 | End: 2017-10-20 | Stop reason: HOSPADM

## 2017-10-19 RX ORDER — DONEPEZIL HYDROCHLORIDE 5 MG/1
5 TABLET, FILM COATED ORAL NIGHTLY
Status: DISCONTINUED | OUTPATIENT
Start: 2017-10-19 | End: 2017-10-20 | Stop reason: HOSPADM

## 2017-10-19 RX ORDER — FUROSEMIDE 10 MG/ML
20 INJECTION INTRAMUSCULAR; INTRAVENOUS ONCE
Status: COMPLETED | OUTPATIENT
Start: 2017-10-19 | End: 2017-10-19

## 2017-10-19 RX ORDER — NAPROXEN SODIUM 220 MG/1
81 TABLET, FILM COATED ORAL
Status: COMPLETED | OUTPATIENT
Start: 2017-10-19 | End: 2017-10-19

## 2017-10-19 RX ORDER — ATORVASTATIN CALCIUM 10 MG/1
20 TABLET, FILM COATED ORAL DAILY
Status: DISCONTINUED | OUTPATIENT
Start: 2017-10-20 | End: 2017-10-20 | Stop reason: HOSPADM

## 2017-10-19 RX ORDER — QUETIAPINE FUMARATE 25 MG/1
25 TABLET, FILM COATED ORAL NIGHTLY
Status: DISCONTINUED | OUTPATIENT
Start: 2017-10-19 | End: 2017-10-20 | Stop reason: HOSPADM

## 2017-10-19 RX ORDER — PANTOPRAZOLE SODIUM 40 MG/1
40 TABLET, DELAYED RELEASE ORAL DAILY
Status: DISCONTINUED | OUTPATIENT
Start: 2017-10-20 | End: 2017-10-20 | Stop reason: HOSPADM

## 2017-10-19 RX ADMIN — DONEPEZIL HYDROCHLORIDE 5 MG: 5 TABLET, FILM COATED ORAL at 08:10

## 2017-10-19 RX ADMIN — QUETIAPINE FUMARATE 25 MG: 25 TABLET, FILM COATED ORAL at 08:10

## 2017-10-19 RX ADMIN — FUROSEMIDE 20 MG: 10 INJECTION, SOLUTION INTRAMUSCULAR; INTRAVENOUS at 06:10

## 2017-10-19 RX ADMIN — SODIUM CHLORIDE 1000 ML: 0.9 INJECTION, SOLUTION INTRAVENOUS at 09:10

## 2017-10-19 RX ADMIN — ASPIRIN 81 MG 81 MG: 81 TABLET ORAL at 12:10

## 2017-10-19 NOTE — ASSESSMENT & PLAN NOTE
Patient with minimally elevated troponin.  EKG is non-ischemic. CXR without acute findings.  She denies chest pain. Plan for continuous telemetry. Obtain serial troponin levels x 2. Monitor.

## 2017-10-19 NOTE — ED PROVIDER NOTES
"Encounter Date: 10/19/2017    SCRIBE #1 NOTE: I, Azael Caceres, am scribing for, and in the presence of,  Neno Lyles MD. I have scribed the following portions of the note - Other sections scribed: HPI and ROS.       History     Chief Complaint   Patient presents with    Fatigue     Pt reports diarrhea and fatigue x 2 days after starting antibiotics, fell yesterday but denies complaints from fall     Chief Complaint: Fatigue    HPI: This 87 y.o. Female with HTN, stroke, depression and dementia presents to the ED secondary to fatigue. Patient she she could not walk this morning. She also states she was not able to wake self up. There's associated generalized weakness. She also reports intermittent diarrhea that has worsened the past few days. Patient states she was "feeling fine" on yesterday. However, hx is limited secondary to dementia.       The history is provided by the patient. No  was used.     Review of patient's allergies indicates:   Allergen Reactions    Penicillins      Past Medical History:   Diagnosis Date    Dementia     Depression     Hypertension     Stroke      Past Surgical History:   Procedure Laterality Date    BREAST SURGERY       History reviewed. No pertinent family history.  Social History   Substance Use Topics    Smoking status: Former Smoker     Types: Cigarettes    Smokeless tobacco: Never Used      Comment: quit 25 years ago    Alcohol use No     Review of Systems   Unable to perform ROS: Dementia   Constitutional: Positive for fatigue.   HENT: Negative.    Eyes: Negative.    Respiratory: Negative.    Cardiovascular: Negative.    Gastrointestinal: Positive for diarrhea.   Endocrine: Negative.    Genitourinary: Negative.    Musculoskeletal: Negative.    Skin: Negative.    Allergic/Immunologic: Negative.    Neurological: Positive for weakness.   Hematological: Negative.    Psychiatric/Behavioral: Negative.        Physical Exam     Initial Vitals " [10/19/17 0842]   BP Pulse Resp Temp SpO2   (!) 113/56 60 18 98 °F (36.7 °C) 95 %      MAP       75         Physical Exam    Nursing note and vitals reviewed.  Constitutional: She appears well-developed and well-nourished.   DMM   HENT:   Head: Normocephalic and atraumatic.   Eyes: EOM are normal. Pupils are equal, round, and reactive to light.   Neck: Normal range of motion. Neck supple.   Cardiovascular: Normal rate and regular rhythm.   Pulmonary/Chest: Breath sounds normal.   Abdominal: Soft. Bowel sounds are normal.   Genitourinary:   Genitourinary Comments: deferred   Musculoskeletal: Normal range of motion.   Neurological: She is alert and oriented to person, place, and time. She has normal strength and normal reflexes.   Skin: Skin is warm. Capillary refill takes less than 2 seconds.   Psychiatric: She has a normal mood and affect. Her behavior is normal. Thought content normal.         ED Course   Procedures  Labs Reviewed   COMPREHENSIVE METABOLIC PANEL - Abnormal; Notable for the following:        Result Value    eGFR if non  58 (*)     All other components within normal limits   CBC W/ AUTO DIFFERENTIAL - Abnormal; Notable for the following:     RBC 3.72 (*)     Hemoglobin 11.8 (*)     Hematocrit 34.8 (*)     MCH 31.7 (*)     RDW 15.3 (*)     Gran # 10.1 (*)     Lymph # 0.1 (*)     Gran% 96.1 (*)     Lymph% 1.1 (*)     Mono% 2.7 (*)     All other components within normal limits   TROPONIN I - Abnormal; Notable for the following:     Troponin I 0.038 (*)     All other components within normal limits   CULTURE, BLOOD   CULTURE, BLOOD   CLOSTRIDIUM DIFFICILE   LACTIC ACID, PLASMA   URINALYSIS     EKG Readings: (Independently Interpreted)   Initial Reading: No STEMI. Rhythm: Normal Sinus Rhythm. Heart Rate: 63. Ectopy: No Ectopy. ST Segments: Normal ST Segments. T Waves: Normal. Axis: Left Axis Deviation. Clinical Impression: Normal Sinus Rhythm          Medical Decision Making:   Initial  Assessment:   Patient is a 7-year-old female nursing home resident presents emergency room reporting diarrhea, weakness ×2 days.  He denies chest pain shortness of breath, pain fever chills associated with weakness and diarrhea.  States she has been on antibiotics, does not know she is being treated for.  Differential Diagnosis:   C. difficile diarrhea  Diarrhea  Dehydration  Clinical Tests:   Lab Tests: Ordered and Reviewed  The following lab test(s) were unremarkable: CBC, Troponin and CMP  Radiological Study: Ordered and Reviewed  ED Management:  IVF, oral hydration, and ASA 81 mg PO            Scribe Attestation:   Scribe #1: I performed the above scribed service and the documentation accurately describes the services I performed. I attest to the accuracy of the note.    Attending Attestation:           Physician Attestation for Scribe:  Physician Attestation Statement for Scribe #1: I, Neno Lyles MD, reviewed documentation, as scribed by Azael Caceres in my presence, and it is both accurate and complete.                 ED Course      Clinical Impression:   The primary encounter diagnosis was Diarrhea, unspecified type. Diagnoses of Weakness, NSTEMI (non-ST elevated myocardial infarction), and Dehydration were also pertinent to this visit.    Disposition:   Disposition: Admitted  Condition: Stable                        Neno Lyles MD  10/19/17 1156       Neno Lyles MD  10/19/17 1220       Neno Lyles MD  10/19/17 1351

## 2017-10-19 NOTE — NURSING
Pt arrive to the unit by stretcher  accompanied by ED staff.  Assisted pt to bed. Tele monitoring initiated.  Admit assessment initiated.   No distress noted. Patient had 2 diapers on that were saturated upon admit to the unit.

## 2017-10-19 NOTE — ED TRIAGE NOTES
Pt arrived to ED via EMS. EMS reported pt tripped and fell this morning. Pt has been had diarrhea and weakness for 2 days, following antibiotic. Pt c/o of dry throat, denies of nausea, headache.pt is AAOx3.

## 2017-10-19 NOTE — PROGRESS NOTES
Patient to scheduled testing as ordered. Tele monitoring in progress. Patient awake, alert, oriented. No apparent distress noted.

## 2017-10-19 NOTE — ASSESSMENT & PLAN NOTE
Patient with dementia, however, tells me she has not had a BM in several day. Medical history includes IBS + recent ?abx therapy. Stool for c-diff pending. Isolation for c-diff.

## 2017-10-19 NOTE — ASSESSMENT & PLAN NOTE
BLE with 3/4 pitting edema + erythematous + tenderness to touch + warmth. She is afebrile and without leukocytosis. Differential diagnosis statis dermatitis +/- cellulitis +/- chronic venous insufficiency. Plan for 1 dose of IV diuretics. Elevation. Echo pending. Wound consult for recs. ID consulted appreciated.

## 2017-10-19 NOTE — HPI
"Oksana Estrada is a 87 y.o. female with a history as below who resided at [facility] and is unable to participate in her history as she is a poor historian with dementia. No family at beside to provided additonal information.  Per the ED note, patient "presents to the ED secondary to fatigue. Patient she she could not walk this morning. She also states she was not able to wake self up. There's associated generalized weakness. She also reports intermittent diarrhea that has worsened the past few days. Patient states she was "feeling fine" on yesterday. However, hx is limited secondary to dementia." Patient tells me she is not sure why she is here "because this is definitely not where I want to be." Additionally, per ED, patient with elevated troponin being admitted for ACS rule out. Patient also with reported liquid stool s/p ?abx therapy, stool for c-diff pending.          "

## 2017-10-19 NOTE — H&P
"[facility] Hospital Medicine  History & Physical    Patient Name: Oksana Estrada  MRN: [id number]  Admission Date: 10/19/2017  Attending Physician: Shikha Acuna MD   Primary Care Provider: Red Griffith MD         Patient information was obtained from ER records.     Subjective:     Principal Problem:<principal problem not specified>    Chief Complaint:   Chief Complaint   Patient presents with    Fatigue     Pt reports diarrhea and fatigue x 2 days after starting antibiotics, fell yesterday but denies complaints from fall        HPI: Oksana Estrada is a 87 y.o. female with a history as below who resided at [facility] and is unable to participate in her history as she is a poor historian with dementia. No family at beside to provided additonal information.  Per the ED note, patient "presents to the ED secondary to fatigue. Patient she she could not walk this morning. She also states she was not able to wake self up. There's associated generalized weakness. She also reports intermittent diarrhea that has worsened the past few days. Patient states she was "feeling fine" on yesterday. However, hx is limited secondary to dementia." Patient tells me she is not sure why she is here "because this is definitely not where I want to be." Additionally, per ED, patient with elevated troponin being admitted for ACS rule out. Patient also with reported liquid stool s/p ?abx therapy, stool for c-diff pending.            Past Medical History:   Diagnosis Date    Dementia     Depression     Hypertension     Stroke        Past Surgical History:   Procedure Laterality Date    BREAST SURGERY         Review of patient's allergies indicates:   Allergen Reactions    Penicillins        No current facility-administered medications on file prior to encounter.      Current Outpatient Prescriptions on File Prior to Encounter   Medication Sig    alendronate (FOSAMAX) 70 MG tablet Take 1 tablet " (70 mg total) by mouth every 7 days.    atorvastatin (LIPITOR) 20 MG tablet Take 1 tablet (20 mg total) by mouth once daily.    chlordiazepoxide-clidinium 5-2.5 mg (LIBRAX) 5-2.5 mg Cap Take 1 capsule by mouth 3 (three) times daily as needed. For irritable bowel syndrome    cholestyramine-aspartame (QUESTRAN LIGHT) 4 gram PwPk Take 1 packet (4 g total) by mouth 2 (two) times daily. As needed for diarrhea    donepezil (ARICEPT) 5 MG tablet Take 1 tablet (5 mg total) by mouth every evening. For memory    escitalopram oxalate (LEXAPRO) 20 MG tablet Take 1 tablet (20 mg total) by mouth once daily.    lisinopril (PRINIVIL,ZESTRIL) 20 MG tablet Take 1 tablet (20 mg total) by mouth once daily.    quetiapine (SEROQUEL) 25 MG Tab Take 1 tablet (25 mg total) by mouth every evening.    tolterodine (DETROL) 1 MG Tab Take 1 tablet (1 mg total) by mouth 2 (two) times daily.     Family History     None        Social History Main Topics    Smoking status: Former Smoker     Types: Cigarettes    Smokeless tobacco: Never Used      Comment: quit 25 years ago    Alcohol use No    Drug use: No    Sexual activity: No     Review of Systems   Unable to perform ROS: Dementia     Objective:     Vital Signs (Most Recent):  Temp: 97.9 °F (36.6 °C) (10/19/17 1156)  Pulse: 70 (10/19/17 1455)  Resp: 16 (10/19/17 1255)  BP: (!) 149/61 (10/19/17 1455)  SpO2: 96 % (10/19/17 1455) Vital Signs (24h Range):  Temp:  [97.9 °F (36.6 °C)-98.4 °F (36.9 °C)] 97.9 °F (36.6 °C)  Pulse:  [60-70] 70  Resp:  [16-18] 16  SpO2:  [95 %-98 %] 96 %  BP: (113-149)/(54-71) 149/61     Weight: 68 kg (150 lb)  Body mass index is 23.49 kg/m².    Physical Exam   Constitutional: She is oriented to person, place, and time. She appears well-developed and well-nourished. She is cooperative.  Non-toxic appearance. No distress.   HENT:   Head: Normocephalic and atraumatic.   Eyes: Conjunctivae and lids are normal. Pupils are equal, round, and reactive to light.   Neck:  Trachea normal, normal range of motion and full passive range of motion without pain. Neck supple. Normal carotid pulses and no JVD present. No tracheal deviation present. No thyroid mass and no thyromegaly present.   Cardiovascular: Normal rate, regular rhythm, S1 normal, S2 normal, normal heart sounds and normal pulses.    Pulmonary/Chest: Effort normal and breath sounds normal. No stridor.   Abdominal: Soft. Normal appearance and bowel sounds are normal. There is no tenderness.   Musculoskeletal: Normal range of motion.   Neurological: She is alert and oriented to person, place, and time. She has normal strength. No cranial nerve deficit or sensory deficit.   Skin: Skin is warm, dry and intact. Rash noted. She is not diaphoretic. There is erythema. No cyanosis. Nails show no clubbing.        BLE + redness, 3/4 pitting edema, warmth and tenderness with touch   Psychiatric: She has a normal mood and affect. Her speech is normal and behavior is normal. Judgment and thought content normal. Cognition and memory are normal.        Significant Labs:   CBC:   Recent Labs  Lab 10/19/17  0953   WBC 10.48   HGB 11.8*   HCT 34.8*        CMP:   Recent Labs  Lab 10/19/17  0953      K 3.8      CO2 26      BUN 16   CREATININE 0.9   CALCIUM 9.0   PROT 6.8   ALBUMIN 3.5   BILITOT 0.5   ALKPHOS 111   AST 26   ALT 29   ANIONGAP 11   EGFRNONAA 58*       Significant Imaging:   Imaging Results          X-Ray Chest 1 View (Final result)  Result time 10/19/17 10:27:48    Final result by Seferino Jamison MD (10/19/17 10:27:48)                 Impression:     Small right pleural effusion, otherwise no acute cardiopulmonary disease      Electronically signed by: SEFERINO JAMISON MD  Date:     10/19/17  Time:    10:27              Narrative:    Portable AP view of the chest was obtained.  Comparison -- 2/6/17.  The heart size is normal.  Mediastinum shows aortic atherosclerosis.  Lungs are expanded and clear.   No lung consolidation is identified.  Small pleural effusion blunts the right sulcus.  Clips are present at the left axilla.                                Assessment/Plan:     Diarrhea    Patient with dementia, however, tells me she has not had a BM in several day. Medical history includes IBS + recent ?abx therapy. Stool for c-diff pending. Isolation for c-diff.               Fall    Per EMS patient with weakness and non-traumatic fall.  Plan for PT/OT to evaluate and treat. Fall precaution           Elevated troponin    Patient with minimally elevated troponin.  EKG is non-ischemic. CXR without acute findings.  She denies chest pain. Plan for continuous telemetry. Obtain serial troponin levels x 2. Monitor.           Peripheral edema    BLE with 3/4 pitting edema + erythematous + tenderness to touch + warmth. She is afebrile and without leukocytosis. Differential diagnosis statis dermatitis +/- cellulitis +/- chronic venous insufficiency. Plan for 1 dose of IV diuretics. Elevation. Echo pending. Wound consult for recs. ID consulted appreciated.            History of CVA (cerebrovascular accident)    No acute issues. Continue GDMT        Dementia    No acute issues. Continue current home medication.             VTE Risk Mitigation         Ordered     Medium Risk of VTE  Once      10/19/17 1537     Place VENUS hose  Until discontinued      10/19/17 1537             CARMEN Mancilla  Department of Hospital Medicine   [facility]

## 2017-10-19 NOTE — CONSULTS
An 87 year old female admitted 10/19/17 from [facility] with diarrhea; weakness; NSTEMI; and dehydration  PMH: Dementia; depression; HTN; stroke; subdural hematoma; breast cancer; bilateral knee replacements  Reports recently moving into [facility]. Was living with son in [place], but wanted to return to [place]  10/19 WBC 10.48 Hgb 11.8 Hct 34.8 Alb 3.5  Consulted for cellulitis of BLE  Assessment:  Photodocumentation    Edema/erythema bilateral lower extremities. No open wounds on lower legs, but fissure between 2nd and 3rd toes right foot. Strong palpable DP pulses.   States legs have been swollen. Denies injury to legs and high sodium dietary intake.   Treatment:  Cleansed legs with Coloplast wipes and chlorhexidine gluconate. Applied single layer of Tubigrip F to bilateral lower legs to manage edema. 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

## 2017-10-19 NOTE — SUBJECTIVE & OBJECTIVE
Past Medical History:   Diagnosis Date    Dementia     Depression     Hypertension     Stroke        Past Surgical History:   Procedure Laterality Date    BREAST SURGERY         Review of patient's allergies indicates:   Allergen Reactions    Penicillins        No current facility-administered medications on file prior to encounter.      Current Outpatient Prescriptions on File Prior to Encounter   Medication Sig    alendronate (FOSAMAX) 70 MG tablet Take 1 tablet (70 mg total) by mouth every 7 days.    atorvastatin (LIPITOR) 20 MG tablet Take 1 tablet (20 mg total) by mouth once daily.    chlordiazepoxide-clidinium 5-2.5 mg (LIBRAX) 5-2.5 mg Cap Take 1 capsule by mouth 3 (three) times daily as needed. For irritable bowel syndrome    cholestyramine-aspartame (QUESTRAN LIGHT) 4 gram PwPk Take 1 packet (4 g total) by mouth 2 (two) times daily. As needed for diarrhea    donepezil (ARICEPT) 5 MG tablet Take 1 tablet (5 mg total) by mouth every evening. For memory    escitalopram oxalate (LEXAPRO) 20 MG tablet Take 1 tablet (20 mg total) by mouth once daily.    lisinopril (PRINIVIL,ZESTRIL) 20 MG tablet Take 1 tablet (20 mg total) by mouth once daily.    quetiapine (SEROQUEL) 25 MG Tab Take 1 tablet (25 mg total) by mouth every evening.    tolterodine (DETROL) 1 MG Tab Take 1 tablet (1 mg total) by mouth 2 (two) times daily.     Family History     None        Social History Main Topics    Smoking status: Former Smoker     Types: Cigarettes    Smokeless tobacco: Never Used      Comment: quit 25 years ago    Alcohol use No    Drug use: No    Sexual activity: No     Review of Systems   Unable to perform ROS: Dementia     Objective:     Vital Signs (Most Recent):  Temp: 97.9 °F (36.6 °C) (10/19/17 1156)  Pulse: 70 (10/19/17 1455)  Resp: 16 (10/19/17 1255)  BP: (!) 149/61 (10/19/17 1455)  SpO2: 96 % (10/19/17 1455) Vital Signs (24h Range):  Temp:  [97.9 °F (36.6 °C)-98.4 °F (36.9 °C)] 97.9 °F (36.6  °C)  Pulse:  [60-70] 70  Resp:  [16-18] 16  SpO2:  [95 %-98 %] 96 %  BP: (113-149)/(54-71) 149/61     Weight: 68 kg (150 lb)  Body mass index is 23.49 kg/m².    Physical Exam   Constitutional: She is oriented to person, place, and time. She appears well-developed and well-nourished. She is cooperative.  Non-toxic appearance. No distress.   HENT:   Head: Normocephalic and atraumatic.   Eyes: Conjunctivae and lids are normal. Pupils are equal, round, and reactive to light.   Neck: Trachea normal, normal range of motion and full passive range of motion without pain. Neck supple. Normal carotid pulses and no JVD present. No tracheal deviation present. No thyroid mass and no thyromegaly present.   Cardiovascular: Normal rate, regular rhythm, S1 normal, S2 normal, normal heart sounds and normal pulses.    Pulmonary/Chest: Effort normal and breath sounds normal. No stridor.   Abdominal: Soft. Normal appearance and bowel sounds are normal. There is no tenderness.   Musculoskeletal: Normal range of motion.   Neurological: She is alert and oriented to person, place, and time. She has normal strength. No cranial nerve deficit or sensory deficit.   Skin: Skin is warm, dry and intact. Rash noted. She is not diaphoretic. There is erythema. No cyanosis. Nails show no clubbing.        BLE + redness, 3/4 pitting edema, warmth and tenderness with touch   Psychiatric: She has a normal mood and affect. Her speech is normal and behavior is normal. Judgment and thought content normal. Cognition and memory are normal.        Significant Labs:   CBC:   Recent Labs  Lab 10/19/17  0953   WBC 10.48   HGB 11.8*   HCT 34.8*        CMP:   Recent Labs  Lab 10/19/17  0953      K 3.8      CO2 26      BUN 16   CREATININE 0.9   CALCIUM 9.0   PROT 6.8   ALBUMIN 3.5   BILITOT 0.5   ALKPHOS 111   AST 26   ALT 29   ANIONGAP 11   EGFRNONAA 58*       Significant Imaging:   Imaging Results          X-Ray Chest 1 View (Final result)   Result time 10/19/17 10:27:48    Final result by Seferino Jamison MD (10/19/17 10:27:48)                 Impression:     Small right pleural effusion, otherwise no acute cardiopulmonary disease      Electronically signed by: SEFERINO JAMISON MD  Date:     10/19/17  Time:    10:27              Narrative:    Portable AP view of the chest was obtained.  Comparison -- 2/6/17.  The heart size is normal.  Mediastinum shows aortic atherosclerosis.  Lungs are expanded and clear.  No lung consolidation is identified.  Small pleural effusion blunts the right sulcus.  Clips are present at the left axilla.

## 2017-10-19 NOTE — ASSESSMENT & PLAN NOTE
Per EMS patient with weakness and non-traumatic fall.  Plan for PT/OT to evaluate and treat. Fall precaution

## 2017-10-19 NOTE — ED NOTES
Pt states cannot give urine at this moment and refused to be catherized. Dr. Lyles notified and verbalized understanding.

## 2017-10-20 VITALS
RESPIRATION RATE: 18 BRPM | WEIGHT: 153.88 LBS | SYSTOLIC BLOOD PRESSURE: 144 MMHG | OXYGEN SATURATION: 97 % | HEIGHT: 67 IN | HEART RATE: 64 BPM | BODY MASS INDEX: 24.15 KG/M2 | TEMPERATURE: 100 F | DIASTOLIC BLOOD PRESSURE: 66 MMHG

## 2017-10-20 LAB
ANION GAP SERPL CALC-SCNC: 7 MMOL/L
BASOPHILS # BLD AUTO: 0.01 K/UL
BASOPHILS NFR BLD: 0.2 %
BUN SERPL-MCNC: 19 MG/DL
CALCIUM SERPL-MCNC: 8 MG/DL
CHLORIDE SERPL-SCNC: 104 MMOL/L
CO2 SERPL-SCNC: 26 MMOL/L
CREAT SERPL-MCNC: 0.9 MG/DL
DIFFERENTIAL METHOD: ABNORMAL
EOSINOPHIL # BLD AUTO: 0 K/UL
EOSINOPHIL NFR BLD: 0.9 %
ERYTHROCYTE [DISTWIDTH] IN BLOOD BY AUTOMATED COUNT: 15.5 %
EST. GFR  (AFRICAN AMERICAN): >60 ML/MIN/1.73 M^2
EST. GFR  (NON AFRICAN AMERICAN): 58 ML/MIN/1.73 M^2
GLUCOSE SERPL-MCNC: 95 MG/DL
HCT VFR BLD AUTO: 28.9 %
HGB BLD-MCNC: 9.5 G/DL
LYMPHOCYTES # BLD AUTO: 0.3 K/UL
LYMPHOCYTES NFR BLD: 7.3 %
MCH RBC QN AUTO: 30.5 PG
MCHC RBC AUTO-ENTMCNC: 32.9 G/DL
MCV RBC AUTO: 93 FL
MONOCYTES # BLD AUTO: 0.4 K/UL
MONOCYTES NFR BLD: 9.1 %
NEUTROPHILS # BLD AUTO: 3.6 K/UL
NEUTROPHILS NFR BLD: 82.5 %
PLATELET # BLD AUTO: 168 K/UL
PMV BLD AUTO: 10.3 FL
POTASSIUM SERPL-SCNC: 3.5 MMOL/L
RBC # BLD AUTO: 3.11 M/UL
SODIUM SERPL-SCNC: 137 MMOL/L
TROPONIN I SERPL DL<=0.01 NG/ML-MCNC: 0.04 NG/ML
WBC # BLD AUTO: 4.39 K/UL

## 2017-10-20 PROCEDURE — G8979 MOBILITY GOAL STATUS: HCPCS | Mod: CJ

## 2017-10-20 PROCEDURE — G8980 MOBILITY D/C STATUS: HCPCS | Mod: CK

## 2017-10-20 PROCEDURE — 97161 PT EVAL LOW COMPLEX 20 MIN: CPT

## 2017-10-20 PROCEDURE — G8978 MOBILITY CURRENT STATUS: HCPCS | Mod: CK

## 2017-10-20 PROCEDURE — 97535 SELF CARE MNGMENT TRAINING: CPT

## 2017-10-20 PROCEDURE — 84484 ASSAY OF TROPONIN QUANT: CPT

## 2017-10-20 PROCEDURE — 85025 COMPLETE CBC W/AUTO DIFF WBC: CPT

## 2017-10-20 PROCEDURE — G8987 SELF CARE CURRENT STATUS: HCPCS | Mod: CL

## 2017-10-20 PROCEDURE — 80048 BASIC METABOLIC PNL TOTAL CA: CPT

## 2017-10-20 PROCEDURE — G8989 SELF CARE D/C STATUS: HCPCS | Mod: CL

## 2017-10-20 PROCEDURE — 25500020 PHARM REV CODE 255: Performed by: INTERNAL MEDICINE

## 2017-10-20 PROCEDURE — G8988 SELF CARE GOAL STATUS: HCPCS | Mod: CL

## 2017-10-20 PROCEDURE — 97165 OT EVAL LOW COMPLEX 30 MIN: CPT

## 2017-10-20 PROCEDURE — 25000003 PHARM REV CODE 250: Performed by: NURSE PRACTITIONER

## 2017-10-20 PROCEDURE — G0378 HOSPITAL OBSERVATION PER HR: HCPCS

## 2017-10-20 PROCEDURE — 96374 THER/PROPH/DIAG INJ IV PUSH: CPT

## 2017-10-20 PROCEDURE — 36415 COLL VENOUS BLD VENIPUNCTURE: CPT

## 2017-10-20 RX ORDER — CHLORTHALIDONE 25 MG/1
25 TABLET ORAL DAILY PRN
Status: ON HOLD | COMMUNITY
End: 2018-01-23 | Stop reason: HOSPADM

## 2017-10-20 RX ORDER — SULFAMETHOXAZOLE AND TRIMETHOPRIM 800; 160 MG/1; MG/1
1 TABLET ORAL 2 TIMES DAILY
Status: ON HOLD | COMMUNITY
End: 2017-10-20 | Stop reason: HOSPADM

## 2017-10-20 RX ORDER — TERBINAFINE HYDROCHLORIDE 250 MG/1
250 TABLET ORAL DAILY
Status: ON HOLD | COMMUNITY
End: 2018-01-23 | Stop reason: HOSPADM

## 2017-10-20 RX ADMIN — ATORVASTATIN CALCIUM 20 MG: 10 TABLET, FILM COATED ORAL at 09:10

## 2017-10-20 RX ADMIN — OXYBUTYNIN CHLORIDE 5 MG: 5 TABLET, EXTENDED RELEASE ORAL at 09:10

## 2017-10-20 RX ADMIN — PANTOPRAZOLE SODIUM 40 MG: 40 TABLET, DELAYED RELEASE ORAL at 09:10

## 2017-10-20 RX ADMIN — IOHEXOL 70 ML: 350 INJECTION, SOLUTION INTRAVENOUS at 01:10

## 2017-10-20 NOTE — PLAN OF CARE
[facility]    HOME HEALTH ORDERS  FACE TO FACE ENCOUNTER    Patient Name: Oksana Estrada  YOB: 1930    PCP: Red Griffith MD   PCP Address: [address] / [place] [address]  PCP Phone Number: [phone]  PCP Fax: [phone]    Encounter Date: 10/20/2017    Admit to Home Health    Diagnoses:  Active Hospital Problems    Diagnosis  POA    Diarrhea [R19.7]  Yes     Priority: 2     History of CVA (cerebrovascular accident) [Z86.73]  Not Applicable    Peripheral edema [R60.9]  Yes    Bilateral edema of lower extremity [R60.0]  Yes    Fissure in skin of foot [R23.4]  Yes    Elevated troponin [R74.8]  Yes    Fall [W19.XXXA]  Yes    Dementia [F03.90]  Yes      Resolved Hospital Problems    Diagnosis Date Resolved POA   No resolved problems to display.       No future appointments.        I have seen and examined this patient face to face today. My clinical findings that support the need for the home health skilled services and home bound status are the following:  Weakness/numbness causing balance and gait disturbance due to Weakness/Debility making it taxing to leave home.    Allergies:  Review of patient's allergies indicates:   Allergen Reactions    Penicillins        Diet: cardiac diet    Activities: activity as tolerated    Nursing:   SN to complete comprehensive assessment including routine vital signs. Instruct on disease process and s/s of complications to report to MD. Review/verify medication list sent home with the patient at time of discharge  and instruct patient/caregiver as needed. Frequency may be adjusted depending on start of care date.    Notify MD if SBP > 160 or < 90; DBP > 90 or < 50; HR > 120 or < 50; Temp > 101;      CONSULTS:    Physical Therapy to evaluate and treat. Evaluate for home safety and equipment needs; Establish/upgrade home exercise program. Perform / instruct on therapeutic exercises, gait training, transfer training, and Range  of Motion.  Occupational Therapy to evaluate and treat. Evaluate home environment for safety and equipment needs. Perform/Instruct on transfers, ADL training, ROM, and therapeutic exercises.  Aide to provide assistance with personal care, ADLs, and vital signs.    MISCELLANEOUS CARE:  Edema/erythema bilateral lower extremities. No open wounds on lower legs, but fissure between 2nd and 3rd toes right foot.     WOUND CARE ORDERS  yes:  Cleansed legs with Coloplast wipes and chlorhexidine gluconate. Applied single layer of Tubigrip F to bilateral lower legs to manage edema. 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       Medications: Review discharge medications with patient and family and provide education.      Current Discharge Medication List      CONTINUE these medications which have NOT CHANGED    Details   chlorthalidone (HYGROTEN) 25 MG Tab Take 25 mg by mouth daily as needed.      terbinafine HCl (LAMISIL) 250 mg tablet Take 250 mg by mouth once daily.      alendronate (FOSAMAX) 70 MG tablet Take 1 tablet (70 mg total) by mouth every 7 days.  Qty: 13 tablet, Refills: 3      atorvastatin (LIPITOR) 20 MG tablet Take 1 tablet (20 mg total) by mouth once daily.  Qty: 90 tablet, Refills: 3      chlordiazepoxide-clidinium 5-2.5 mg (LIBRAX) 5-2.5 mg Cap Take 1 capsule by mouth 3 (three) times daily as needed. For irritable bowel syndrome  Qty: 30 capsule, Refills: 0      cholestyramine-aspartame (QUESTRAN LIGHT) 4 gram PwPk Take 1 packet (4 g total) by mouth 2 (two) times daily. As needed for diarrhea  Qty: 30 packet, Refills: 1      donepezil (ARICEPT) 5 MG tablet Take 1 tablet (5 mg total) by mouth every evening. For memory  Qty: 90 tablet, Refills: 3      escitalopram oxalate (LEXAPRO) 20 MG tablet Take 1 tablet (20 mg total) by mouth once daily.  Qty: 90 tablet, Refills: 3      lisinopril (PRINIVIL,ZESTRIL) 20 MG tablet Take 1 tablet (20 mg total) by mouth once daily.  Qty: 90 tablet, Refills: 3      quetiapine  (SEROQUEL) 25 MG Tab Take 1 tablet (25 mg total) by mouth every evening.  Qty: 30 tablet, Refills: 11      tolterodine (DETROL) 1 MG Tab Take 1 tablet (1 mg total) by mouth 2 (two) times daily.  Qty: 180 tablet, Refills: 3         STOP taking these medications       sulfamethoxazole-trimethoprim 800-160mg (BACTRIM DS) 800-160 mg Tab Comments:   Reason for Stopping:               I certify that this patient is confined to her home and needs intermittent skilled nursing care, physical therapy and occupational therapy and A.

## 2017-10-20 NOTE — PLAN OF CARE
Problem: Fall Risk (Adult)  Goal: Absence of Falls  Patient will demonstrate the desired outcomes by discharge/transition of care.   Outcome: Outcome(s) achieved Date Met: 10/20/17   10/20/17 1418   Fall Risk (Adult)   Absence of Falls achieves outcome       Problem: Patient Care Overview  Goal: Plan of Care Review  Outcome: Outcome(s) achieved Date Met: 10/20/17   10/20/17 1418   Coping/Psychosocial   Plan Of Care Reviewed With patient;son       Problem: Infection, Risk/Actual (Adult)  Goal: Infection Prevention/Resolution  Patient will demonstrate the desired outcomes by discharge/transition of care.   Outcome: Outcome(s) achieved Date Met: 10/20/17   10/20/17 1418   Infection, Risk/Actual (Adult)   Infection Prevention/Resolution achieves outcome

## 2017-10-20 NOTE — NURSING
Unable to obtain weight, due to patient unable to stand to zero bed out. Weight function not available until bed is zeroed out.  Passed on to oncoming nurse

## 2017-10-20 NOTE — PT/OT/SLP EVAL
Occupational Therapy  Evaluation/Discharge    Oksana Estrada   MRN: [id number]   Admitting Diagnosis: <principal problem not specified>    OT Date of Treatment: 10/20/17   OT Start Time: 0952  OT Stop Time: 1030  OT Total Time (min): 38 min    Billable Minutes:  Evaluation 10 with co treatment with PT  Self Care/Home Management 28    Diagnosis: <principal problem not specified>       Past Medical History:   Diagnosis Date    Dementia     Depression     Hypertension     Stroke       Past Surgical History:   Procedure Laterality Date    BREAST SURGERY         Referring physician: Jacob  Date referred to OT: 10.19.17    General Precautions: Standard, fall  Orthopedic Precautions: N/A  Braces:            Patient History:  Living Environment  Lives With: alone  Living Arrangements: independent living facility  Home Accessibility:  (no concerns)  Transportation Available: car, family or friend will provide  Equipment Currently Used at Home: walker, rolling, bedside commode    Prior level of function:   Bed Mobility/Transfers: independent  Grooming: independent  Bathing: needs assist  Upper Body Dressing: independent  Lower Body Dressing: independent  Toileting: needs assist  Home Management Skills: needs assist  Homemaking Responsibilities: No  Driving License: No     Dominant hand: left    Subjective:  Communicated with nurse Medley prior to session.    Chief Complaint: Weakness  Patient/Family stated goals: to go home    Pain/Comfort  Pain Rating 1: 0/10  Pain Rating Post-Intervention 1: 0/10    Objective:  Patient found with: telemetry, peripheral IV    Cognitive Exam:  Oriented to: Person, Place, Time and Situation  Follows Commands/attention: Follows two-step commands  Communication: clear/fluent  Memory:  No Deficits noted  Safety awareness/insight to disability: intact  Coping skills/emotional control: Appropriate to situation    Visual/perceptual:  Intact    Physical Exam:  Postural examination/scapula  alignment: Rounded shoulder  Skin integrity: Visible skin intact  Edema: Moderate left UE    Sensation:   Intact    Upper Extremity Range of Motion:  Right Upper Extremity: WFL  Left Upper Extremity: WFL    Upper Extremity Strength:  Right Upper Extremity: WFL  Left Upper Extremity: WFL   Strength: good    Fine motor coordination:   Intact    Gross motor coordination: WFL    Functional Mobility:  Bed Mobility:  Rolling/Turning to Left: Moderate assistance  Supine to Sit: Moderate Assistance  Sit to Supine: Moderate Assistance    Transfers:  Sit <> Stand Assistance: Moderate Assistance  Sit <> Stand Assistive Device: Rolling Walker  Bed <> Chair Technique: Stand Pivot  Bed <> Chair Transfer Assistance: Minimum Assistance, Moderate Assistance  Bed <> Chair Assistive Device: Rolling Walker    Functional Ambulation: Pt able to ambulate with assistance     Activities of Daily Living:  Feeding Level of Assistance: Set-up Assistance  UE Dressing Level of Assistance: Minimum assistance  Grooming Position: Seated  Grooming Level of Assistance: Supervision  Toileting Where Assessed: Toilet  Toileting Level of Assistance: Maximum assistance   Balance:   Static Sit: FAIR+: Able to take MINIMAL challenges from all directions  Dynamic Sit: FAIR+: Maintains balance through MINIMAL excursions of active trunk motion  Static Stand: POOR: Needs MODERATE assist to maintain  Dynamic stand: POOR: N/A    Therapeutic Activities and Exercises:  OT addressed self care in bedside chair, Pt requires increased time to complete.    AM-PAC 6 CLICK ADL  How much help from another person does this patient currently need?  1 = Unable, Total/Dependent Assistance  2 = A lot, Maximum/Moderate Assistance  3 = A little, Minimum/Contact Guard/Supervision  4 = None, Modified [place]/Independent    Putting on and taking off regular lower body clothing? : 1  Bathing (including washing, rinsing, drying)?: 2  Toileting, which includes using toilet,  "bedpan, or urinal? : 1  Putting on and taking off regular upper body clothing?: 3  Taking care of personal grooming such as brushing teeth?: 3  Eating meals?: 3  Total Score: 13    AM-PAC Raw Score CMS "G-Code Modifier Level of Impairment Assistance   6 % Total / Unable   7 - 9 CM 80 - 100% Maximal Assist   10-14 CL 60 - 80% Moderate Assist   15 - 19 CK 40 - 60% Moderate Assist   20 - 22 CJ 20 - 40% Minimal Assist   23 CI 1-20% SBA / CGA   24 CH 0% Independent/ Mod I       Patient left up in chair with call button in reach    Assessment:  Oksana Estrada is a 87 y.o. female with a medical diagnosis of <principal problem not specified> and presents with good family support with the assistance of a PCT in the independent living center.    Rehab identified problem list/impairments: Rehab identified problem list/impairments: weakness, impaired endurance, impaired self care skills, gait instability, impaired functional mobilty, impaired balance, decreased lower extremity function, decreased ROM, edema, impaired joint extensibility, impaired muscle length    Rehab potential is good.    Activity tolerance: Good    Discharge recommendations: Discharge Facility/Level Of Care Needs: home health OT, home health PT     Barriers to discharge: Barriers to Discharge:  (PCA assistance at home)    Equipment recommendations: bedside commode, walker, rolling     GOALS:    Occupational Therapy Goals     Not on file          Multidisciplinary Problems (Resolved)        Problem: Occupational Therapy Goal    Goal Priority Disciplines Outcome Interventions   Occupational Therapy Goal   (Resolved)     OT, PT/OT Outcome(s) achieved                    PLAN: OT completed evaluation with no needs at this time.  Plan of Care reviewed with: patient    OT G-codes  Functional Assessment Tool Used: [facility]  Score: 13  Functional Limitation: Self care  Self Care Current Status (): CL  Self Care Goal Status (): CL  Self Care " Discharge Status (): CL    Mellisa Stephens OT  10/20/2017

## 2017-10-20 NOTE — DISCHARGE SUMMARY
"[facility] Hospital Medicine  Discharge Summary      Patient Name: Oksana Estrada  MRN: [id number]  Admission Date: 10/19/2017  Hospital Length of Stay: 0 days  Discharge Date and Time:  10/20/2017 2:07 PM  Attending Physician: Shikha Acuna MD   Discharging Provider: CARMEN Mancilla  Primary Care Provider: Red Griffith MD      HPI:   Oksana Estrada is a 87 y.o. female with a history as below who resided at [facility] and is unable to participate in her history as she is a poor historian with dementia. No family at beside to provided additonal information.  Per the ED note, patient "presents to the ED secondary to fatigue. Patient she she could not walk this morning. She also states she was not able to wake self up. There's associated generalized weakness. She also reports intermittent diarrhea that has worsened the past few days. Patient states she was "feeling fine" on yesterday. However, hx is limited secondary to dementia." Patient tells me she is not sure why she is here "because this is definitely not where I want to be." Additionally, per ED, patient with elevated troponin being admitted for ACS rule out. Patient also with reported liquid stool s/p ?abx therapy, stool for c-diff pending.            * No surgery found *      Indwelling Lines/Drains at time of discharge:   Lines/Drains/Airways          No matching active lines, drains, or airways        Hospital Course:   Patient evaluated for a non-traumatic fall 2/2 generalized weakness and was found to have mildly elevated so was admitted for rule out ACS. She denies chest pain. EKG is non ischemic. Serial troponin level minimally elevated, but flat. CXR without acute cardiothoracic processes. Echocardiogram shows EF 50% + DD. PTA patient apparently had some episodes of diarrhea which seemed to have resolved. She was recently started on abx. Per son, patient took ~ 3 doses. He further states she has IBS and " has intermittent episode of diarrhea.  Doubt C-diff as patient did not have any BMs during hospitalization. Son further tells me she was on antibiotics for ?cellulitis to bilat LE.  ID consulted and evaluated and notes statis dermatitis to LE, discontinue antibiotics. PT/OT evaluated with recommendation for HH PT + rolling walker and bedside commode. Discharge discussed with son via telephone. Patient is stable and will discharge home at this time with instructions to follow up with PCP in one week.        Consults:   Consults         Status Ordering Provider     Inpatient consult to Infectious Diseases  Once     Provider:  Chandni Pinzon MD    Completed JACOB FERNANDEZ          Significant Diagnostic Studies: Labs: All labs within the past 24 hours have been reviewed    Pending Diagnostic Studies:     None        Final Active Diagnoses:    Diagnosis Date Noted POA    PRINCIPAL PROBLEM:  Fall [W19.XXXA] 10/19/2017 Yes    Diarrhea [R19.7] 10/19/2017 Yes    History of CVA (cerebrovascular accident) [Z86.73] 10/19/2017 Not Applicable    Peripheral edema [R60.9] 10/19/2017 Yes    Bilateral edema of lower extremity [R60.0] 10/19/2017 Yes    Fissure in skin of foot [R23.4] 10/19/2017 Yes    Elevated troponin [R74.8] 10/19/2017 Yes    Dementia [F03.90] 09/25/2017 Yes      Problems Resolved During this Admission:    Diagnosis Date Noted Date Resolved POA      * Fall    Per EMS patient with weakness and non-traumatic fall.  Plan for PT/OT to evaluate and treat. Fall precaution           Diarrhea    Patient with dementia, however, tells me she has not had a BM in several day. Medical history includes IBS + recent ?abx therapy. Stool for c-diff pending. Isolation for c-diff.               Elevated troponin    Patient with minimally elevated troponin.  EKG is non-ischemic. CXR without acute findings.  She denies chest pain. Plan for continuous telemetry. Obtain serial troponin levels x 2. Monitor.          "      Discharged Condition: fair    Disposition: Home or Self Care    Follow Up:  Follow-up Information     Red Griffith MD.    Specialty:  Family Medicine  Contact information:  [address] [facility] [place] [address] [phone]             [facility].    Specialty:  Home Health Services  Why:  Home Health  Contact information:  [address] [place] [address]  [phone]                 Patient Instructions:     COMMODE FOR HOME USE   Order Specific Question Answer Comments   Type: Standard    Height: 5' 7" (1.702 m)    Weight: 69.8 kg (153 lb 14.1 oz)    Length of need (1-99 months): 99      WALKER FOR HOME USE   Order Specific Question Answer Comments   Type of Walker: Adult (5'4"-6'6")    With wheels? Yes    Height: 5' 7" (1.702 m)    Weight: 69.8 kg (153 lb 14.1 oz)    Length of need (1-99 months): 99    Please check all that apply: Patient's condition impairs ambulation.      Diet general   Order Specific Question Answer Comments   Na restriction, if any: 2gNa      Activity as tolerated     Call MD for:  temperature >100.4     Call MD for:  persistent nausea and vomiting or diarrhea     Call MD for:  severe uncontrolled pain     Call MD for:  difficulty breathing or increased cough     Call MD for:  severe persistent headache     Call MD for:  persistent dizziness, light-headedness, or visual disturbances     Call MD for:  increased confusion or weakness       Medications:  Reconciled Home Medications:   Current Discharge Medication List      CONTINUE these medications which have NOT CHANGED    Details   chlorthalidone (HYGROTEN) 25 MG Tab Take 25 mg by mouth daily as needed.      terbinafine HCl (LAMISIL) 250 mg tablet Take 250 mg by mouth once daily.      alendronate (FOSAMAX) 70 MG tablet Take 1 tablet (70 mg total) by mouth every 7 days.  Qty: 13 tablet, Refills: 3      atorvastatin (LIPITOR) 20 MG tablet Take 1 tablet (20 mg total) by mouth once daily.  Qty: 90 tablet, Refills: 3    "   chlordiazepoxide-clidinium 5-2.5 mg (LIBRAX) 5-2.5 mg Cap Take 1 capsule by mouth 3 (three) times daily as needed. For irritable bowel syndrome  Qty: 30 capsule, Refills: 0      cholestyramine-aspartame (QUESTRAN LIGHT) 4 gram PwPk Take 1 packet (4 g total) by mouth 2 (two) times daily. As needed for diarrhea  Qty: 30 packet, Refills: 1      donepezil (ARICEPT) 5 MG tablet Take 1 tablet (5 mg total) by mouth every evening. For memory  Qty: 90 tablet, Refills: 3      escitalopram oxalate (LEXAPRO) 20 MG tablet Take 1 tablet (20 mg total) by mouth once daily.  Qty: 90 tablet, Refills: 3      lisinopril (PRINIVIL,ZESTRIL) 20 MG tablet Take 1 tablet (20 mg total) by mouth once daily.  Qty: 90 tablet, Refills: 3      quetiapine (SEROQUEL) 25 MG Tab Take 1 tablet (25 mg total) by mouth every evening.  Qty: 30 tablet, Refills: 11      tolterodine (DETROL) 1 MG Tab Take 1 tablet (1 mg total) by mouth 2 (two) times daily.  Qty: 180 tablet, Refills: 3         STOP taking these medications       sulfamethoxazole-trimethoprim 800-160mg (BACTRIM DS) 800-160 mg Tab Comments:   Reason for Stopping:             Time spent on the discharge of patient: 30 minutes      CARMEN Mancilla  Department of Hospital Medicine  [facility]

## 2017-10-20 NOTE — PROGRESS NOTES
Sitting up in chair at bedside. Tubigrip in place to bilateral lower legs. 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 Less edema/erythema in bilateral lower legs since assessed 10/19.   Discharging back to assisted living with Home Health.

## 2017-10-20 NOTE — HOSPITAL COURSE
Patient evaluated for a non-traumatic fall 2/2 generalized weakness and was found to have mildly elevated so was admitted for rule out ACS. She denies chest pain. EKG is non ischemic. Serial troponin level minimally elevated, but flat. CXR without acute cardiothoracic processes. Echocardiogram shows EF 50% + DD. PTA patient apparently had some episodes of diarrhea which seemed to have resolved. She was recently started on abx. Per son, patient took ~ 3 doses. He further states she has IBS and has intermittent episode of diarrhea.  Doubt C-diff as patient did not have any BMs during hospitalization. Son further tells me she was on antibiotics for ?cellulitis to bilat LE.  ID consulted and evaluated and notes statis dermatitis to LE, discontinue antibiotics. PT/OT evaluated with recommendation for HH PT + rolling walker and bedside commode. Discharge discussed with son via telephone. Patient is stable and will discharge home at this time with instructions to follow up with PCP in one week.

## 2017-10-20 NOTE — NURSING
Pt  Oxygen sats dropped 89-91% on R/A no shortness of breath but labored breathing noted, Oxygen 2L NC applied for comfort, spoke with Kiko Blood, stated to remove oxygen  and continue to monitor stats

## 2017-10-20 NOTE — PT/OT/SLP EVAL
"Physical Therapy  Evaluation / Discharge    Oksana Estrada   MRN: [id number]   Admitting Diagnosis: Fall    PT Received On: 10/20/17  PT Start Time: 0952     PT Stop Time: 1017    PT Total Time (min): 25 min       Billable Minutes:  Evaluation  25 with OT present     Diagnosis: Fall    Past Medical History:   Diagnosis Date    Dementia     Depression     Hypertension     Stroke       Past Surgical History:   Procedure Laterality Date    BREAST SURGERY       Referring physician: ANDRIY Garcia  Date referred to PT: 10/19/17    General Precautions: Standard, fall  Orthopedic Precautions: N/A   Braces: N/A       Patient History:  Lives With: alone  Living Arrangements: assisted living ([facility])  Home Accessibility: other (see comments) (no concerns)  Living Environment Comment: No family present to confirm PLOF. Per nurse report, patient has 24 hour sitters at [facility].   Equipment Currently Used at Home: rollator, shower chair    Previous Level of Function:  Ambulation Skills: needs device  Transfer Skills: needs device    Subjective:  Communicated with nurse Medley prior to session.  Patient agreeable to participate in PT evaluation.   Chief Complaint: "I need to go to the bathroom"  Patient goals: To go back to assisted living.     Pain/Comfort  Pain Rating 1: 0/10    Objective:   Patient found with: telemetry, peripheral IV     Cognitive Exam:  Oriented to: Person    Follows Commands/attention: Follows one-step commands  Communication: clear/fluent  Safety awareness/insight to disability: impaired    Physical Exam:  Postural examination/scapula alignment: Rounded shoulder and Head forward    Skin integrity: Visible skin intact  Edema: Moderate left elbow and B LE's    Sensation: Impaired to B LE's    Lower Extremity Range of Motion:  Right Lower Extremity: WFL  Left Lower Extremity: WFL    Lower Extremity Strength:  Right Lower Extremity: 3+/5  Left Lower Extremity: 3+/5    Gross motor coordination: " impaired due to weakness    Functional Mobility:  Bed Mobility:  Supine to Sit: Minimum Assistance, With side rail    Transfers:  Sit <> Stand Assistance: Moderate Assistance (x2 trials from bed with verbal/tactile cues for safe technique )  Sit <> Stand Assistive Device: Rolling Walker    Gait:   Gait Distance: ~30ft with forward flexed posture and patient requiring verbal cues to obtain upright posture   Assistance 1: Contact Guard Assistance  Gait Assistive Device: Rolling walker  Gait Pattern: 3-point gait  Gait Deviation(s): decreased carlos, increased time in double stance, decreased velocity of limb motion, decreased step length    Balance:   Static Sit: FAIR+: Able to take MINIMAL challenges from all directions  Dynamic Sit: FAIR+: Maintains balance through MINIMAL excursions of active trunk motion  Static Stand: FAIR: Maintains without assist but unable to take challenges  Dynamic stand: FAIR: Needs CONTACT GUARD during gait    AM-PAC 6 CLICK MOBILITY  How much help from another person does this patient currently need?   1 = Unable, Total/Dependent Assistance  2 = A lot, Maximum/Moderate Assistance  3 = A little, Minimum/Contact Guard/Supervision  4 = None, Modified [place]/Independent    Turning over in bed (including adjusting bedclothes, sheets and blankets)?: 3  Sitting down on and standing up from a chair with arms (e.g., wheelchair, bedside commode, etc.): 3  Moving from lying on back to sitting on the side of the bed?: 3  Moving to and from a bed to a chair (including a wheelchair)?: 3  Need to walk in hospital room?: 3  Climbing 3-5 steps with a railing?: 1  Total Score: 16     AM-PAC Raw Score CMS G-Code Modifier Level of Impairment Assistance   6 % Total / Unable   7 - 9 CM 80 - 100% Maximal Assist   10 - 14 CL 60 - 80% Moderate Assist   15 - 19 CK 40 - 60% Moderate Assist   20 - 22 CJ 20 - 40% Minimal Assist   23 CI 1-20% SBA / CGA   24 CH 0% Independent/ Mod I     Patient left  reclined in bedside chair  with all lines intact, call button in reach, nurse notified and OT  present.    Assessment:   Oksana Estrada is a 87 y.o. female with a medical diagnosis of Fall and presents with decreased strength and impaired balance for functional mobility. She was limited in PT evaluation due to stiffness from laying in bed since admission. She has 24 hour assistance in her assisted living apartment. NP and nurse notified that she would benefit from home health PT, RW, and BSC at discharge. She was discharged home after PT session.     Rehab identified problem list/impairments: Rehab identified problem list/impairments: impaired endurance, weakness, impaired self care skills, impaired functional mobilty, impaired balance, gait instability, decreased lower extremity function, decreased safety awareness, decreased ROM, edema, impaired cardiopulmonary response to activity    Rehab potential is fair.    Activity tolerance: Fair    Discharge recommendations: Discharge Facility/Level Of Care Needs: home health PT     Barriers to discharge: Barriers to Discharge: Other (Comment) (has 24 hour assistance at home )    Equipment recommendations: Equipment Needed After Discharge: walker, rolling, bedside commode     GOALS:    Physical Therapy Goals     Not on file          Multidisciplinary Problems (Resolved)        Problem: Physical Therapy Goal    Goal Priority Disciplines Outcome Goal Variances Interventions   Physical Therapy Goal   (Resolved)     PT/OT, PT Outcome(s) achieved     Description:  Goals to be met by: 10/27/17     Patient will increase functional independence with mobility by performin. Supine to sit with Stand-by Assistance  2. Sit to stand transfer with Stand-by Assistance  3. Gait  x250 feet with Stand-by Assistance using Rolling Walker  4. Lower extremity exercise program x30 reps per handout, with supervision                    PLAN:    Plan of Care reviewed with:  patient    Functional Assessment Tool Used: AM PAC   Score: 16  Functional Limitation: Mobility: Walking and moving around  Mobility: Walking and Moving Around Current Status (): ARELIS  Mobility: Walking and Moving Around Goal Status (): CJ  Mobility: Walking and Moving Around Discharge Status (): ARELIS [place], PT, MOT  10/20/2017

## 2017-10-20 NOTE — PLAN OF CARE
Problem: Physical Therapy Goal  Goal: Physical Therapy Goal  Goals to be met by: 10/27/17     Patient will increase functional independence with mobility by performin. Supine to sit with Stand-by Assistance  2. Sit to stand transfer with Stand-by Assistance  3. Gait  x250 feet with Stand-by Assistance using Rolling Walker  4. Lower extremity exercise program x30 reps per handout, with supervision      Patient would benefit RW, BSC, and HH PT. She has 24 hour assistance at [facility].

## 2017-10-20 NOTE — CONSULTS
Consult Note  Infectious Disease    Consult Requested By: Shikha Acuna MD    Reason for Consult: stasis dermatitis vs cellulitis    SUBJECTIVE:     History of Present Illness:  Patient is a 87 y.o. female presents with  Diarrhea following antibiotics. She apparently was taking an antibiotic for ? unknown reasons. A stool for c diff is pending. Her wbc is normal.she has no fever. She has bilateral leg swelling and mild erythema,. tte shows an ef of 50%. She now has her legs wrapped.    Past Medical History:   Diagnosis Date    Dementia     Depression     Hypertension     Stroke      Past Surgical History:   Procedure Laterality Date    BREAST SURGERY       History reviewed. No pertinent family history.  Social History   Substance Use Topics    Smoking status: Former Smoker     Types: Cigarettes    Smokeless tobacco: Never Used      Comment: quit 25 years ago    Alcohol use No       Review of patient's allergies indicates:   Allergen Reactions    Penicillins         Antibiotics     None          Review of Systems:  Constitutional: no fever or chills  Eyes: no visual changes  ENT: no nasal congestion or sore throat  Respiratory: no cough or shortness of breath  Cardiovascular: no chest pain or palpitations  Gastrointestinal: no nausea or vomiting, no abdominal pain or change in bowel habits  Genitourinary: no hematuria or dysuria  Integument/Breast: no rash or pruritis  Musculoskeletal: no arthralgias or myalgias    OBJECTIVE:     Vital Signs (Most Recent)  Temp: 99.9 °F (37.7 °C) (10/20/17 1131)  Pulse: 64 (10/20/17 1131)  Resp: 18 (10/20/17 1131)  BP: (!) 144/66 (10/20/17 1131)  SpO2: 97 % (10/20/17 1131)    Temperature Range Min/Max (Last 24H):  Temp:  [97.3 °F (36.3 °C)-99.9 °F (37.7 °C)]     Physical Exam:  General: appears stated age  Eyes: conjunctivae/corneas clear. PERRL..  HENT: Head:normocephalic, atraumatic. Ears:not examined. Nose: Nares normal. Septum midline. Mucosa normal. No drainage or  sinus tenderness., no discharge. Throat: lips, mucosa, and tongue normal; teeth and gums normal and no throat erythema.  Neck: supple, symmetrical, trachea midline, no JVD and thyroid not enlarged, symmetric, no tenderness/mass/nodules  Lungs:  clear to auscultation bilaterally and normal respiratory effort  Cardiovascular: Heart: regular rate and rhythm, S1, S2 normal, no murmur, click, rub or gallop. Chest Wall: no tenderness. Extremities: both legs with stasis dermatits. Pulses: 2+ and symmetric.  Abdomen/Rectal: Abdomen: soft, non-tender non-distented; bowel sounds normal; no masses,  no organomegaly. Rectal: not examined  Skin: Skin color, texture, turgor normal. No rashes or lesions  Musculoskeletal:no clubbing, cyanosis    Laboratory:  CBC    Recent Labs  Lab 10/20/17  0711   WBC 4.39   RBC 3.11*   HGB 9.5*   HCT 28.9*        BMP    Recent Labs  Lab 10/20/17  0711   CO2 26   BUN 19   CREATININE 0.9   CALCIUM 8.0*     No results for input(s): COLORU, CLARITYU, SPECGRAV, PHUR, PROTEINUA, GLUCOSEU, BILIRUBINCON, BLOODU, WBCU, RBCU, BACTERIA, MUCUS, NITRITE, LEUKOCYTESUR, UROBILINOGEN, HYALINECASTS in the last 168 hours.  Microbiology Results (last 7 days)     Procedure Component Value Units Date/Time    Blood culture #1 **CANNOT BE ORDERED STAT** [[id number]] Collected:  10/19/17 0932    Order Status:  Completed Specimen:  Blood from Peripheral, Hand, Right Updated:  10/20/17 1103     Blood Culture, Routine No Growth to date     Blood Culture, Routine No Growth to date    Blood Culture #2 **CANNOT BE ORDERED STAT** [[id number]] Collected:  10/19/17 0953    Order Status:  Completed Specimen:  Blood from Peripheral, Wrist, Right Updated:  10/20/17 1103     Blood Culture, Routine No Growth to date     Blood Culture, Routine No Growth to date    Clostridium difficile EIA [[id number]]     Order Status:  Canceled Specimen:  Stool from Stool           Diagnostic Results:  Labs: Reviewed  X-Ray:  Reviewed    ASSESSMENT/PLAN:     Active Hospital Problems    Diagnosis  POA    Diarrhea [R19.7]  Yes    History of CVA (cerebrovascular accident) [Z86.73]  Not Applicable    Peripheral edema [R60.9]  Yes    Bilateral edema of lower extremity [R60.0]  Yes    Fissure in skin of foot [R23.4]  Yes    Elevated troponin [R74.8]  Yes    Fall [W19.XXXA]  Yes    Dementia [F03.90]  Yes      Resolved Hospital Problems    Diagnosis Date Resolved POA   No resolved problems to display.       1. stasis dermatitis  No abx  2. Doubt c diff with normal wbc

## 2017-10-20 NOTE — PLAN OF CARE
10/20/17 1045   Discharge Assessment   Assessment Type Discharge Planning Assessment   Confirmed/corrected address and phone number on facesheet? Yes   Assessment information obtained from? Caregiver;Medical Record   Prior to hospitilization cognitive status: (pt has hx of dementia)   Prior to hospitalization functional status: Independent;Assistive Equipment   Current cognitive status: (pt has hx of dementia)   Current Functional Status: Independent;Assistive Equipment   Lives With facility resident  (pt lives at [facility] and has private sitters)   Able to Return to Prior Arrangements yes   Is patient able to care for self after discharge? Yes   Who are your caregiver(s) and their phone number(s)? yinka Bautista [phone]   Patient's perception of discharge disposition home or selfcare;home health   Readmission Within The Last 30 Days no previous admission in last 30 days   Patient currently being followed by outpatient case management? No   Patient currently receives any other outside agency services? Yes   Equipment Currently Used at Home bedside commode;walker, rolling   Do you have any problems affording any of your prescribed medications? No   Is the patient taking medications as prescribed? yes   Does the patient have transportation home? Yes   Transportation Available car;family or friend will provide   Does the patient receive services at the [facility]? No   Discharge Plan A Home with family;Home Health   Patient/Family In Agreement With Plan yes     [facility] - [place], [place] - [address]  [address]  [address]  [place] [address]  Phone: [phone] Fax: [phone]    [id number] - [place], [place] - [address] AT [facility] [address]  [address] [facility] [address]  Phone: [phone] Fax: [phone]

## 2017-10-20 NOTE — PLAN OF CARE
Problem: Occupational Therapy Goal  Goal: Occupational Therapy Goal  Outcome: Outcome(s) achieved Date Met: 10/20/17  OT completed evaluation. Pt  Has a sitter that is available to all needs.    REC: home health OT and Home health PT and RW and Bedside commode.

## 2017-10-24 LAB
BACTERIA BLD CULT: NORMAL
BACTERIA BLD CULT: NORMAL

## 2017-12-20 ENCOUNTER — HOSPITAL ENCOUNTER (INPATIENT)
Facility: HOSPITAL | Age: 82
LOS: 8 days | Discharge: NURSING, REHAB OR LONG-TERM CARE | DRG: 481 | End: 2017-12-28
Attending: EMERGENCY MEDICINE | Admitting: HOSPITALIST
Payer: MEDICARE

## 2017-12-20 DIAGNOSIS — S72.90XA FEMUR FRACTURE: ICD-10-CM

## 2017-12-20 DIAGNOSIS — S72.409A CLOSED FRACTURE OF DISTAL END OF FEMUR, UNSPECIFIED FRACTURE MORPHOLOGY, INITIAL ENCOUNTER: Primary | ICD-10-CM
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
## 2017-12-20 DIAGNOSIS — S82.892A CLOSED FRACTURE OF LEFT ANKLE, INITIAL ENCOUNTER: ICD-10-CM

## 2017-12-20 DIAGNOSIS — S72.401A CLOSED FRACTURE OF DISTAL END OF RIGHT FEMUR: ICD-10-CM

## 2017-12-20 PROBLEM — F03.90 DEMENTIA: Chronic | Status: ACTIVE | Noted: 2017-09-25

## 2017-12-20 PROBLEM — R60.0 BILATERAL EDEMA OF LOWER EXTREMITY: Status: RESOLVED | Noted: 2017-10-19 | Resolved: 2017-12-20

## 2017-12-20 PROBLEM — R79.89 ELEVATED TROPONIN: Status: RESOLVED | Noted: 2017-10-19 | Resolved: 2017-12-20

## 2017-12-20 PROBLEM — Z86.73 HISTORY OF CVA (CEREBROVASCULAR ACCIDENT): Chronic | Status: ACTIVE | Noted: 2017-10-19

## 2017-12-20 PROBLEM — Z86.79 HISTORY OF SUBDURAL HEMATOMA: Chronic | Status: ACTIVE | Noted: 2017-02-07

## 2017-12-20 PROBLEM — E78.5 HYPERLIPIDEMIA: Chronic | Status: ACTIVE | Noted: 2017-12-20

## 2017-12-20 PROBLEM — R19.7 DIARRHEA: Status: RESOLVED | Noted: 2017-10-19 | Resolved: 2017-12-20

## 2017-12-20 PROBLEM — S09.90XA HEAD TRAUMA: Status: RESOLVED | Noted: 2017-02-08 | Resolved: 2017-12-20

## 2017-12-20 PROBLEM — R60.0 PERIPHERAL EDEMA: Status: RESOLVED | Noted: 2017-10-19 | Resolved: 2017-12-20

## 2017-12-20 PROBLEM — R23.4 FISSURE IN SKIN OF FOOT: Status: RESOLVED | Noted: 2017-10-19 | Resolved: 2017-12-20

## 2017-12-20 PROBLEM — S42.031A: Status: RESOLVED | Noted: 2017-02-07 | Resolved: 2017-12-20

## 2017-12-20 PROBLEM — I10 ESSENTIAL HYPERTENSION: Chronic | Status: ACTIVE | Noted: 2017-12-20

## 2017-12-20 PROBLEM — I63.9 STROKE: Status: RESOLVED | Noted: 2017-09-25 | Resolved: 2017-12-20

## 2017-12-20 PROBLEM — D63.8 ANEMIA OF CHRONIC DISEASE: Chronic | Status: ACTIVE | Noted: 2017-12-20

## 2017-12-20 LAB
ABO + RH BLD: NORMAL
ALBUMIN SERPL BCP-MCNC: 3.5 G/DL
ALP SERPL-CCNC: 101 U/L
ALT SERPL W/O P-5'-P-CCNC: 27 U/L
ANION GAP SERPL CALC-SCNC: 10 MMOL/L
APTT BLDCRRT: 29.1 SEC
AST SERPL-CCNC: 29 U/L
BACTERIA #/AREA URNS HPF: NORMAL /HPF
BASOPHILS # BLD AUTO: 0.01 K/UL
BASOPHILS NFR BLD: 0.1 %
BILIRUB SERPL-MCNC: 0.3 MG/DL
BILIRUB UR QL STRIP: NEGATIVE
BLD GP AB SCN CELLS X3 SERPL QL: NORMAL
BUN SERPL-MCNC: 13 MG/DL
CALCIUM SERPL-MCNC: 9 MG/DL
CHLORIDE SERPL-SCNC: 101 MMOL/L
CLARITY UR: CLEAR
CO2 SERPL-SCNC: 24 MMOL/L
COLOR UR: YELLOW
CREAT SERPL-MCNC: 0.9 MG/DL
DIFFERENTIAL METHOD: ABNORMAL
EOSINOPHIL # BLD AUTO: 0 K/UL
EOSINOPHIL NFR BLD: 0.1 %
ERYTHROCYTE [DISTWIDTH] IN BLOOD BY AUTOMATED COUNT: 15.2 %
EST. GFR  (AFRICAN AMERICAN): >60 ML/MIN/1.73 M^2
EST. GFR  (NON AFRICAN AMERICAN): 58 ML/MIN/1.73 M^2
GLUCOSE SERPL-MCNC: 105 MG/DL
GLUCOSE UR QL STRIP: NEGATIVE
HCT VFR BLD AUTO: 35.6 %
HGB BLD-MCNC: 11.8 G/DL
HGB UR QL STRIP: ABNORMAL
INR PPP: 1
KETONES UR QL STRIP: ABNORMAL
LEUKOCYTE ESTERASE UR QL STRIP: ABNORMAL
LYMPHOCYTES # BLD AUTO: 0.5 K/UL
LYMPHOCYTES NFR BLD: 6 %
MCH RBC QN AUTO: 30.3 PG
MCHC RBC AUTO-ENTMCNC: 33.1 G/DL
MCV RBC AUTO: 92 FL
MICROSCOPIC COMMENT: NORMAL
MONOCYTES # BLD AUTO: 1 K/UL
MONOCYTES NFR BLD: 11.6 %
NEUTROPHILS # BLD AUTO: 6.9 K/UL
NEUTROPHILS NFR BLD: 81.8 %
NITRITE UR QL STRIP: NEGATIVE
PH UR STRIP: 6 [PH] (ref 5–8)
PLATELET # BLD AUTO: 250 K/UL
PMV BLD AUTO: 10.4 FL
POTASSIUM SERPL-SCNC: 4.4 MMOL/L
PROT SERPL-MCNC: 6.9 G/DL
PROT UR QL STRIP: NEGATIVE
PROTHROMBIN TIME: 10.7 SEC
RBC # BLD AUTO: 3.89 M/UL
RBC #/AREA URNS HPF: 2 /HPF (ref 0–4)
SODIUM SERPL-SCNC: 135 MMOL/L
SP GR UR STRIP: 1.02 (ref 1–1.03)
SQUAMOUS #/AREA URNS HPF: 3 /HPF
TROPONIN I SERPL DL<=0.01 NG/ML-MCNC: <0.006 NG/ML
URN SPEC COLLECT METH UR: ABNORMAL
UROBILINOGEN UR STRIP-ACNC: NEGATIVE EU/DL
WBC # BLD AUTO: 8.48 K/UL
WBC #/AREA URNS HPF: 1 /HPF (ref 0–5)

## 2017-12-20 PROCEDURE — 93005 ELECTROCARDIOGRAM TRACING: CPT

## 2017-12-20 PROCEDURE — 85730 THROMBOPLASTIN TIME PARTIAL: CPT

## 2017-12-20 PROCEDURE — 86920 COMPATIBILITY TEST SPIN: CPT

## 2017-12-20 PROCEDURE — 93010 ELECTROCARDIOGRAM REPORT: CPT | Mod: ,,, | Performed by: INTERNAL MEDICINE

## 2017-12-20 PROCEDURE — 99285 EMERGENCY DEPT VISIT HI MDM: CPT | Mod: 25

## 2017-12-20 PROCEDURE — 84484 ASSAY OF TROPONIN QUANT: CPT

## 2017-12-20 PROCEDURE — 12000002 HC ACUTE/MED SURGE SEMI-PRIVATE ROOM

## 2017-12-20 PROCEDURE — 96361 HYDRATE IV INFUSION ADD-ON: CPT

## 2017-12-20 PROCEDURE — 80053 COMPREHEN METABOLIC PANEL: CPT

## 2017-12-20 PROCEDURE — 63600175 PHARM REV CODE 636 W HCPCS: Performed by: EMERGENCY MEDICINE

## 2017-12-20 PROCEDURE — 85025 COMPLETE CBC W/AUTO DIFF WBC: CPT

## 2017-12-20 PROCEDURE — 87086 URINE CULTURE/COLONY COUNT: CPT

## 2017-12-20 PROCEDURE — 81000 URINALYSIS NONAUTO W/SCOPE: CPT

## 2017-12-20 PROCEDURE — 25000003 PHARM REV CODE 250: Performed by: EMERGENCY MEDICINE

## 2017-12-20 PROCEDURE — 96374 THER/PROPH/DIAG INJ IV PUSH: CPT

## 2017-12-20 PROCEDURE — 86901 BLOOD TYPING SEROLOGIC RH(D): CPT

## 2017-12-20 PROCEDURE — P9612 CATHETERIZE FOR URINE SPEC: HCPCS

## 2017-12-20 PROCEDURE — 85610 PROTHROMBIN TIME: CPT

## 2017-12-20 RX ORDER — CHLORTHALIDONE 25 MG/1
25 TABLET ORAL DAILY
Status: DISCONTINUED | OUTPATIENT
Start: 2017-12-21 | End: 2017-12-28 | Stop reason: HOSPADM

## 2017-12-20 RX ORDER — ONDANSETRON 2 MG/ML
8 INJECTION INTRAMUSCULAR; INTRAVENOUS EVERY 8 HOURS PRN
Status: DISCONTINUED | OUTPATIENT
Start: 2017-12-20 | End: 2017-12-28 | Stop reason: HOSPADM

## 2017-12-20 RX ORDER — ACETAMINOPHEN 325 MG/1
650 TABLET ORAL EVERY 4 HOURS PRN
Status: DISCONTINUED | OUTPATIENT
Start: 2017-12-20 | End: 2017-12-20

## 2017-12-20 RX ORDER — RAMELTEON 8 MG/1
8 TABLET ORAL NIGHTLY PRN
Status: DISCONTINUED | OUTPATIENT
Start: 2017-12-20 | End: 2017-12-28 | Stop reason: HOSPADM

## 2017-12-20 RX ORDER — QUETIAPINE FUMARATE 25 MG/1
25 TABLET, FILM COATED ORAL NIGHTLY
Status: DISCONTINUED | OUTPATIENT
Start: 2017-12-20 | End: 2017-12-28 | Stop reason: HOSPADM

## 2017-12-20 RX ORDER — DONEPEZIL HYDROCHLORIDE 5 MG/1
5 TABLET, FILM COATED ORAL NIGHTLY
Status: DISCONTINUED | OUTPATIENT
Start: 2017-12-20 | End: 2017-12-28 | Stop reason: HOSPADM

## 2017-12-20 RX ORDER — TRAMADOL HYDROCHLORIDE 50 MG/1
50 TABLET ORAL EVERY 6 HOURS PRN
Status: DISCONTINUED | OUTPATIENT
Start: 2017-12-20 | End: 2017-12-28 | Stop reason: HOSPADM

## 2017-12-20 RX ORDER — MORPHINE SULFATE 10 MG/ML
2 INJECTION INTRAMUSCULAR; INTRAVENOUS; SUBCUTANEOUS EVERY 4 HOURS PRN
Status: DISCONTINUED | OUTPATIENT
Start: 2017-12-20 | End: 2017-12-20

## 2017-12-20 RX ORDER — MORPHINE SULFATE 10 MG/ML
4 INJECTION INTRAMUSCULAR; INTRAVENOUS; SUBCUTANEOUS EVERY 4 HOURS PRN
Status: DISCONTINUED | OUTPATIENT
Start: 2017-12-20 | End: 2017-12-23

## 2017-12-20 RX ORDER — SODIUM CHLORIDE 0.9 % (FLUSH) 0.9 %
3 SYRINGE (ML) INJECTION
Status: DISCONTINUED | OUTPATIENT
Start: 2017-12-20 | End: 2017-12-20

## 2017-12-20 RX ORDER — LISINOPRIL 20 MG/1
20 TABLET ORAL DAILY
Status: DISCONTINUED | OUTPATIENT
Start: 2017-12-21 | End: 2017-12-28 | Stop reason: HOSPADM

## 2017-12-20 RX ORDER — CLONIDINE HYDROCHLORIDE 0.1 MG/1
0.1 TABLET ORAL 3 TIMES DAILY PRN
Status: DISCONTINUED | OUTPATIENT
Start: 2017-12-20 | End: 2017-12-28 | Stop reason: HOSPADM

## 2017-12-20 RX ORDER — ONDANSETRON 2 MG/ML
4 INJECTION INTRAMUSCULAR; INTRAVENOUS EVERY 6 HOURS PRN
Status: DISCONTINUED | OUTPATIENT
Start: 2017-12-20 | End: 2017-12-20

## 2017-12-20 RX ORDER — MORPHINE SULFATE 10 MG/ML
2 INJECTION INTRAMUSCULAR; INTRAVENOUS; SUBCUTANEOUS
Status: COMPLETED | OUTPATIENT
Start: 2017-12-20 | End: 2017-12-20

## 2017-12-20 RX ORDER — AMOXICILLIN 250 MG
1 CAPSULE ORAL 2 TIMES DAILY
Status: DISCONTINUED | OUTPATIENT
Start: 2017-12-20 | End: 2017-12-28 | Stop reason: HOSPADM

## 2017-12-20 RX ORDER — PROCHLORPERAZINE EDISYLATE 5 MG/ML
5 INJECTION INTRAMUSCULAR; INTRAVENOUS EVERY 6 HOURS PRN
Status: DISCONTINUED | OUTPATIENT
Start: 2017-12-20 | End: 2017-12-28 | Stop reason: HOSPADM

## 2017-12-20 RX ORDER — ACETAMINOPHEN 500 MG
500 TABLET ORAL EVERY 6 HOURS PRN
Status: DISCONTINUED | OUTPATIENT
Start: 2017-12-20 | End: 2017-12-28 | Stop reason: HOSPADM

## 2017-12-20 RX ORDER — POLYETHYLENE GLYCOL 3350 17 G/17G
17 POWDER, FOR SOLUTION ORAL DAILY
Status: DISCONTINUED | OUTPATIENT
Start: 2017-12-21 | End: 2017-12-20

## 2017-12-20 RX ORDER — ATORVASTATIN CALCIUM 10 MG/1
20 TABLET, FILM COATED ORAL DAILY
Status: DISCONTINUED | OUTPATIENT
Start: 2017-12-21 | End: 2017-12-28 | Stop reason: HOSPADM

## 2017-12-20 RX ORDER — SODIUM CHLORIDE 9 MG/ML
125 INJECTION, SOLUTION INTRAVENOUS ONCE
Status: COMPLETED | OUTPATIENT
Start: 2017-12-20 | End: 2017-12-20

## 2017-12-20 RX ADMIN — SODIUM CHLORIDE 125 ML/HR: 0.9 INJECTION, SOLUTION INTRAVENOUS at 03:12

## 2017-12-20 RX ADMIN — DONEPEZIL HYDROCHLORIDE 5 MG: 5 TABLET, FILM COATED ORAL at 09:12

## 2017-12-20 RX ADMIN — MORPHINE SULFATE 2 MG: 10 INJECTION INTRAVENOUS at 02:12

## 2017-12-20 RX ADMIN — QUETIAPINE FUMARATE 25 MG: 25 TABLET, FILM COATED ORAL at 09:12

## 2017-12-20 RX ADMIN — DOCUSATE SODIUM AND SENNOSIDES 1 TABLET: 8.6; 5 TABLET, FILM COATED ORAL at 09:12

## 2017-12-20 NOTE — ED PROVIDER NOTES
Encounter Date: 12/20/2017    SCRIBE #1 NOTE: I, Sanchez Pisano, am scribing for, and in the presence of,  Megan Beebe MD. I have scribed the following portions of the note - Other sections scribed: HPI/ROS/PE.       History     Chief Complaint   Patient presents with    Fall     twice this morning. Right knee dislocated, splint in place. 100mcg of Fentanyl given.      CC: Fall    HPI: This 87 y.o. Female with a medical history of dementia, depression, HTN, and stroke presents to the ED via EMS for an evaluation of acute onset right leg pain after fall this morning. Patient reports she was attempting to get out of the bed to use the restroom, but ended up falling. She received 100mcg of Fentanyl en route to ED. She does not have any complaints of pain at this time. No alleviating or exacerbating factors. Otherwise, patient denies fever, chills, chest pain, SOB, head injury, and LOC.      The history is provided by the patient. No  was used.     Review of patient's allergies indicates:   Allergen Reactions    Codeine     Penicillins      Past Medical History:   Diagnosis Date    Dementia     Depression     Hypertension     Stroke      Past Surgical History:   Procedure Laterality Date    BREAST SURGERY      JOINT REPLACEMENT      bilateral knee replacement     History reviewed. No pertinent family history.  Social History   Substance Use Topics    Smoking status: Former Smoker     Types: Cigarettes    Smokeless tobacco: Never Used      Comment: quit 25 years ago    Alcohol use No     Review of Systems   Constitutional: Negative for chills and fever.   HENT: Negative for congestion, ear pain, rhinorrhea and sore throat.    Eyes: Negative for pain and visual disturbance.   Respiratory: Negative for cough and shortness of breath.    Cardiovascular: Negative for chest pain.   Gastrointestinal: Positive for abdominal pain (occasional). Negative for diarrhea, nausea and vomiting.    Genitourinary: Negative for dysuria.   Musculoskeletal: Positive for gait problem and joint swelling. Negative for back pain and neck pain.   Skin: Negative for rash.   Neurological: Negative for headaches.        (-) Head Injury  (-) LOC       Physical Exam     Initial Vitals [12/20/17 1102]   BP Pulse Resp Temp SpO2   (!) 175/75 69 17 98.6 °F (37 °C) 98 %      MAP       108.33         Physical Exam    Nursing note and vitals reviewed.  Constitutional: She appears well-developed and well-nourished.  Non-toxic appearance. She does not appear ill.   HENT:   Head: Normocephalic and atraumatic.   Eyes: EOM are normal.   Neck: Neck supple.   Cardiovascular: Normal rate, regular rhythm and intact distal pulses.   Pulmonary/Chest: Effort normal and breath sounds normal. No respiratory distress.   Abdominal: Soft. Normal appearance and bowel sounds are normal. She exhibits no distension. There is no tenderness.   Musculoskeletal: She exhibits edema and tenderness.   +right knee deformity. Pain with attempted ROM of the right knee. Able to wiggle toes, plantarflex and dosriflex rt foot. DPs palpable and symmetrical   Neurological: She is alert. She has normal strength. No cranial nerve deficit or sensory deficit.   Skin: Skin is warm and dry.   Psychiatric: She has a normal mood and affect.         ED Course   Procedures  Labs Reviewed   CBC W/ AUTO DIFFERENTIAL - Abnormal; Notable for the following:        Result Value    RBC 3.89 (*)     Hemoglobin 11.8 (*)     Hematocrit 35.6 (*)     RDW 15.2 (*)     Lymph # 0.5 (*)     Gran% 81.8 (*)     Lymph% 6.0 (*)     All other components within normal limits   COMPREHENSIVE METABOLIC PANEL - Abnormal; Notable for the following:     Sodium 135 (*)     eGFR if non  58 (*)     All other components within normal limits   URINALYSIS - Abnormal; Notable for the following:     Ketones, UA 2+ (*)     Occult Blood UA 1+ (*)     Leukocytes, UA Trace (*)     All other  components within normal limits   CULTURE, URINE   TROPONIN I   PROTIME-INR   APTT   URINALYSIS MICROSCOPIC   TYPE & SCREEN     EKG Readings: (Independently Interpreted)   Initial Reading: No STEMI. Previous EKG Date: 10/19/17. Rhythm: Normal Sinus Rhythm. Heart Rate: 75. Ectopy: No Ectopy. Conduction: LAFP. ST Segments: Normal ST Segments. T Waves: Normal.       X-Rays:   Independently Interpreted Readings:   Chest X-Ray: Scarring vs consolidation in RUL     Medical Decision Making:   History:   Old Medical Records: I decided to obtain old medical records.  Old Records Summarized: other records.       <> Summary of Records: Seen in ER in 10/2017 after fall  Initial Assessment:   RLE deformity after fall  Differential Diagnosis:   Fracture  Dislocation  contusion  Independently Interpreted Test(s):   I have ordered and independently interpreted X-rays - see prior notes.  I have ordered and independently interpreted EKG Reading(s) - see prior notes  Clinical Tests:   Lab Tests: Ordered and Reviewed  Radiological Study: Ordered and Reviewed  Medical Tests: Ordered and Reviewed  ED Management:  Patient arrived in NAD, noted to hare Rt knee deformity.Patient reports history of Rt knee replacement. Patient neurovascularly intact distally. Xrays note rt distal femur fracture and rt distal fibula fracture.     Consult: I have spoken with Dr. Orantes from the orthopedics service regarding the patient's presentation and study results.  At this time, the recommendation is admission to medicine for medical optimization and prior to operative intervention.  Other:   I have discussed this case with another health care provider.       <> Summary of the Discussion: I have discussed the patient's presentation and workup with the hospitalist who agrees with placing the patient in the hospital.  I have placed orders for the hospitalist.               Scribe Attestation:   Scribe #1: I performed the above scribed service and the  documentation accurately describes the services I performed. I attest to the accuracy of the note.    Attending Attestation:           Physician Attestation for Scribe:  Physician Attestation Statement for Scribe #1: I, Megan Beebe MD, reviewed documentation, as scribed by Sanchez Pisano in my presence, and it is both accurate and complete.                 ED Course      Clinical Impression:   The primary encounter diagnosis was Closed fracture of distal end of femur, unspecified fracture morphology, initial encounter. Diagnoses of Fall, Closed fracture of left ankle, initial encounter, and Femur fracture were also pertinent to this visit.    Disposition:   Disposition: Admitted  Condition: Fair                        Megan Beebe MD  12/20/17 3176

## 2017-12-20 NOTE — ED TRIAGE NOTES
Patient presents to the ED via ems from [facility] assisted [facility]. EMS reported that patient had a slip and fall this morning in bathroom, landing with her right leg bent to the side. Patient reports pain to right knee. Patient denies hitting head and loc. Pt is aaox4 and speaking in complete sentences. Patient also had fall earlier this week, but was not medically treated. Patient also received 100 mcg of fentanyl from ems en route.

## 2017-12-21 ENCOUNTER — ANESTHESIA (OUTPATIENT)
Dept: SURGERY | Facility: HOSPITAL | Age: 82
DRG: 481 | End: 2017-12-21
Payer: MEDICARE

## 2017-12-21 ENCOUNTER — SURGERY (OUTPATIENT)
Age: 82
End: 2017-12-21

## 2017-12-21 ENCOUNTER — ANESTHESIA EVENT (OUTPATIENT)
Dept: SURGERY | Facility: HOSPITAL | Age: 82
DRG: 481 | End: 2017-12-21
Payer: MEDICARE

## 2017-12-21 LAB
ALBUMIN SERPL BCP-MCNC: 3.5 G/DL
ALP SERPL-CCNC: 96 U/L
ALT SERPL W/O P-5'-P-CCNC: 23 U/L
ANION GAP SERPL CALC-SCNC: 11 MMOL/L
AST SERPL-CCNC: 30 U/L
BASOPHILS # BLD AUTO: 0.01 K/UL
BASOPHILS NFR BLD: 0.1 %
BILIRUB SERPL-MCNC: 0.4 MG/DL
BUN SERPL-MCNC: 12 MG/DL
CALCIUM SERPL-MCNC: 8.8 MG/DL
CHLORIDE SERPL-SCNC: 99 MMOL/L
CO2 SERPL-SCNC: 23 MMOL/L
CREAT SERPL-MCNC: 0.8 MG/DL
DIFFERENTIAL METHOD: ABNORMAL
EOSINOPHIL # BLD AUTO: 0 K/UL
EOSINOPHIL NFR BLD: 0 %
ERYTHROCYTE [DISTWIDTH] IN BLOOD BY AUTOMATED COUNT: 15.2 %
EST. GFR  (AFRICAN AMERICAN): >60 ML/MIN/1.73 M^2
EST. GFR  (NON AFRICAN AMERICAN): >60 ML/MIN/1.73 M^2
GLUCOSE SERPL-MCNC: 97 MG/DL
HCT VFR BLD AUTO: 33.2 %
HGB BLD-MCNC: 11.3 G/DL
LYMPHOCYTES # BLD AUTO: 0.7 K/UL
LYMPHOCYTES NFR BLD: 8.8 %
MAGNESIUM SERPL-MCNC: 1.8 MG/DL
MCH RBC QN AUTO: 30.8 PG
MCHC RBC AUTO-ENTMCNC: 34 G/DL
MCV RBC AUTO: 91 FL
MONOCYTES # BLD AUTO: 1.1 K/UL
MONOCYTES NFR BLD: 12.9 %
NEUTROPHILS # BLD AUTO: 6.4 K/UL
NEUTROPHILS NFR BLD: 78.2 %
PHOSPHATE SERPL-MCNC: 3.1 MG/DL
PLATELET # BLD AUTO: 256 K/UL
PMV BLD AUTO: 10.7 FL
POTASSIUM SERPL-SCNC: 3.6 MMOL/L
PROT SERPL-MCNC: 7.1 G/DL
RBC # BLD AUTO: 3.67 M/UL
SODIUM SERPL-SCNC: 133 MMOL/L
WBC # BLD AUTO: 8.2 K/UL

## 2017-12-21 PROCEDURE — 36415 COLL VENOUS BLD VENIPUNCTURE: CPT

## 2017-12-21 PROCEDURE — 84100 ASSAY OF PHOSPHORUS: CPT

## 2017-12-21 PROCEDURE — 25000003 PHARM REV CODE 250: Performed by: NURSE ANESTHETIST, CERTIFIED REGISTERED

## 2017-12-21 PROCEDURE — D9220A PRA ANESTHESIA: Mod: CRNA,,, | Performed by: NURSE ANESTHETIST, CERTIFIED REGISTERED

## 2017-12-21 PROCEDURE — 63600175 PHARM REV CODE 636 W HCPCS: Performed by: ORTHOPAEDIC SURGERY

## 2017-12-21 PROCEDURE — 80053 COMPREHEN METABOLIC PANEL: CPT

## 2017-12-21 PROCEDURE — 0QSB04Z REPOSITION RIGHT LOWER FEMUR WITH INTERNAL FIXATION DEVICE, OPEN APPROACH: ICD-10-PCS | Performed by: ORTHOPAEDIC SURGERY

## 2017-12-21 PROCEDURE — C1769 GUIDE WIRE: HCPCS | Performed by: ORTHOPAEDIC SURGERY

## 2017-12-21 PROCEDURE — D9220A PRA ANESTHESIA: Mod: ANES,,, | Performed by: ANESTHESIOLOGY

## 2017-12-21 PROCEDURE — 27201423 OPTIME MED/SURG SUP & DEVICES STERILE SUPPLY: Performed by: ORTHOPAEDIC SURGERY

## 2017-12-21 PROCEDURE — 63600175 PHARM REV CODE 636 W HCPCS: Performed by: ANESTHESIOLOGY

## 2017-12-21 PROCEDURE — 37000008 HC ANESTHESIA 1ST 15 MINUTES: Performed by: ORTHOPAEDIC SURGERY

## 2017-12-21 PROCEDURE — 12000002 HC ACUTE/MED SURGE SEMI-PRIVATE ROOM

## 2017-12-21 PROCEDURE — C1776 JOINT DEVICE (IMPLANTABLE): HCPCS | Performed by: ORTHOPAEDIC SURGERY

## 2017-12-21 PROCEDURE — 36000711: Performed by: ORTHOPAEDIC SURGERY

## 2017-12-21 PROCEDURE — C1713 ANCHOR/SCREW BN/BN,TIS/BN: HCPCS | Performed by: ORTHOPAEDIC SURGERY

## 2017-12-21 PROCEDURE — 71000033 HC RECOVERY, INTIAL HOUR: Performed by: ORTHOPAEDIC SURGERY

## 2017-12-21 PROCEDURE — 63600175 PHARM REV CODE 636 W HCPCS: Performed by: NURSE ANESTHETIST, CERTIFIED REGISTERED

## 2017-12-21 PROCEDURE — 25000003 PHARM REV CODE 250: Performed by: HOSPITALIST

## 2017-12-21 PROCEDURE — 37000009 HC ANESTHESIA EA ADD 15 MINS: Performed by: ORTHOPAEDIC SURGERY

## 2017-12-21 PROCEDURE — 25000003 PHARM REV CODE 250: Performed by: EMERGENCY MEDICINE

## 2017-12-21 PROCEDURE — 63600175 PHARM REV CODE 636 W HCPCS: Performed by: INTERNAL MEDICINE

## 2017-12-21 PROCEDURE — 83735 ASSAY OF MAGNESIUM: CPT

## 2017-12-21 PROCEDURE — 36000710: Performed by: ORTHOPAEDIC SURGERY

## 2017-12-21 PROCEDURE — 25000003 PHARM REV CODE 250: Performed by: INTERNAL MEDICINE

## 2017-12-21 PROCEDURE — 25000003 PHARM REV CODE 250: Performed by: ORTHOPAEDIC SURGERY

## 2017-12-21 PROCEDURE — 85025 COMPLETE CBC W/AUTO DIFF WBC: CPT

## 2017-12-21 DEVICE — SCREW CORTEX 4.5 36M: Type: IMPLANTABLE DEVICE | Site: FEMUR | Status: FUNCTIONAL

## 2017-12-21 DEVICE — IMPLANTABLE DEVICE: Type: IMPLANTABLE DEVICE | Site: FEMUR | Status: FUNCTIONAL

## 2017-12-21 DEVICE — SCREW CANN VA LOK 5X80MM: Type: IMPLANTABLE DEVICE | Site: FEMUR | Status: FUNCTIONAL

## 2017-12-21 DEVICE — SCREW CANN VA LOK 5X55MM: Type: IMPLANTABLE DEVICE | Site: FEMUR | Status: FUNCTIONAL

## 2017-12-21 DEVICE — SCREW CANN VA LOK 5X65MM: Type: IMPLANTABLE DEVICE | Site: FEMUR | Status: FUNCTIONAL

## 2017-12-21 DEVICE — SCREW 4.5MM CRTX 54MM SLTP: Type: IMPLANTABLE DEVICE | Site: FEMUR | Status: FUNCTIONAL

## 2017-12-21 RX ORDER — MUPIROCIN 20 MG/G
1 OINTMENT TOPICAL 2 TIMES DAILY
Status: DISCONTINUED | OUTPATIENT
Start: 2017-12-21 | End: 2017-12-26

## 2017-12-21 RX ORDER — FAMOTIDINE 20 MG/1
20 TABLET, FILM COATED ORAL 2 TIMES DAILY
Status: DISCONTINUED | OUTPATIENT
Start: 2017-12-21 | End: 2017-12-28 | Stop reason: HOSPADM

## 2017-12-21 RX ORDER — DEXTROSE MONOHYDRATE AND SODIUM CHLORIDE 5; .9 G/100ML; G/100ML
INJECTION, SOLUTION INTRAVENOUS CONTINUOUS
Status: DISCONTINUED | OUTPATIENT
Start: 2017-12-21 | End: 2017-12-22

## 2017-12-21 RX ORDER — ACETAMINOPHEN 10 MG/ML
1000 INJECTION, SOLUTION INTRAVENOUS ONCE
Status: COMPLETED | OUTPATIENT
Start: 2017-12-21 | End: 2017-12-21

## 2017-12-21 RX ORDER — PROPOFOL 10 MG/ML
VIAL (ML) INTRAVENOUS
Status: DISCONTINUED | OUTPATIENT
Start: 2017-12-21 | End: 2017-12-21

## 2017-12-21 RX ORDER — OXYCODONE AND ACETAMINOPHEN 5; 325 MG/1; MG/1
1 TABLET ORAL
Status: DISCONTINUED | OUTPATIENT
Start: 2017-12-21 | End: 2017-12-24

## 2017-12-21 RX ORDER — HYDROMORPHONE HYDROCHLORIDE 2 MG/ML
0.2 INJECTION, SOLUTION INTRAMUSCULAR; INTRAVENOUS; SUBCUTANEOUS EVERY 5 MIN PRN
Status: DISCONTINUED | OUTPATIENT
Start: 2017-12-21 | End: 2017-12-24

## 2017-12-21 RX ORDER — METOCLOPRAMIDE HYDROCHLORIDE 5 MG/ML
10 INJECTION INTRAMUSCULAR; INTRAVENOUS EVERY 10 MIN PRN
Status: DISCONTINUED | OUTPATIENT
Start: 2017-12-21 | End: 2017-12-28 | Stop reason: HOSPADM

## 2017-12-21 RX ORDER — SODIUM CHLORIDE 0.9 % (FLUSH) 0.9 %
5 SYRINGE (ML) INJECTION
Status: DISCONTINUED | OUTPATIENT
Start: 2017-12-21 | End: 2017-12-28 | Stop reason: HOSPADM

## 2017-12-21 RX ORDER — SODIUM CHLORIDE, SODIUM LACTATE, POTASSIUM CHLORIDE, CALCIUM CHLORIDE 600; 310; 30; 20 MG/100ML; MG/100ML; MG/100ML; MG/100ML
INJECTION, SOLUTION INTRAVENOUS CONTINUOUS PRN
Status: DISCONTINUED | OUTPATIENT
Start: 2017-12-21 | End: 2017-12-21

## 2017-12-21 RX ORDER — SODIUM CHLORIDE 0.9 % (FLUSH) 0.9 %
3 SYRINGE (ML) INJECTION
Status: DISCONTINUED | OUTPATIENT
Start: 2017-12-21 | End: 2017-12-28 | Stop reason: HOSPADM

## 2017-12-21 RX ORDER — HALOPERIDOL 5 MG/ML
1 INJECTION INTRAMUSCULAR EVERY 6 HOURS PRN
Status: DISCONTINUED | OUTPATIENT
Start: 2017-12-21 | End: 2017-12-28 | Stop reason: HOSPADM

## 2017-12-21 RX ORDER — MEPERIDINE HYDROCHLORIDE 50 MG/ML
12.5 INJECTION INTRAMUSCULAR; INTRAVENOUS; SUBCUTANEOUS ONCE AS NEEDED
Status: ACTIVE | OUTPATIENT
Start: 2017-12-21 | End: 2017-12-21

## 2017-12-21 RX ORDER — LIDOCAINE HCL/PF 100 MG/5ML
SYRINGE (ML) INTRAVENOUS
Status: DISCONTINUED | OUTPATIENT
Start: 2017-12-21 | End: 2017-12-21

## 2017-12-21 RX ORDER — FENTANYL CITRATE 50 UG/ML
INJECTION, SOLUTION INTRAMUSCULAR; INTRAVENOUS
Status: DISCONTINUED | OUTPATIENT
Start: 2017-12-21 | End: 2017-12-21

## 2017-12-21 RX ORDER — PHENYLEPHRINE HYDROCHLORIDE 10 MG/ML
INJECTION INTRAVENOUS
Status: DISCONTINUED | OUTPATIENT
Start: 2017-12-21 | End: 2017-12-21

## 2017-12-21 RX ADMIN — EPHEDRINE SULFATE 5 MG: 50 INJECTION, SOLUTION INTRAMUSCULAR; INTRAVENOUS; SUBCUTANEOUS at 02:12

## 2017-12-21 RX ADMIN — PHENYLEPHRINE HYDROCHLORIDE 200 MCG: 10 INJECTION INTRAVENOUS at 02:12

## 2017-12-21 RX ADMIN — CHLORTHALIDONE 25 MG: 25 TABLET ORAL at 09:12

## 2017-12-21 RX ADMIN — DEXTROSE AND SODIUM CHLORIDE: 5; .9 INJECTION, SOLUTION INTRAVENOUS at 09:12

## 2017-12-21 RX ADMIN — FENTANYL CITRATE 25 MCG: 50 INJECTION INTRAMUSCULAR; INTRAVENOUS at 02:12

## 2017-12-21 RX ADMIN — SODIUM CHLORIDE, SODIUM LACTATE, POTASSIUM CHLORIDE, AND CALCIUM CHLORIDE: .6; .31; .03; .02 INJECTION, SOLUTION INTRAVENOUS at 01:12

## 2017-12-21 RX ADMIN — EPHEDRINE SULFATE 10 MG: 50 INJECTION, SOLUTION INTRAMUSCULAR; INTRAVENOUS; SUBCUTANEOUS at 02:12

## 2017-12-21 RX ADMIN — VANCOMYCIN HYDROCHLORIDE 1 G: 1 INJECTION, POWDER, LYOPHILIZED, FOR SOLUTION INTRAVENOUS at 01:12

## 2017-12-21 RX ADMIN — EPHEDRINE SULFATE 150 MG: 50 INJECTION, SOLUTION INTRAMUSCULAR; INTRAVENOUS; SUBCUTANEOUS at 02:12

## 2017-12-21 RX ADMIN — VANCOMYCIN HYDROCHLORIDE 1000 MG: 1 INJECTION, POWDER, LYOPHILIZED, FOR SOLUTION INTRAVENOUS at 07:12

## 2017-12-21 RX ADMIN — PROPOFOL 100 MG: 10 INJECTION, EMULSION INTRAVENOUS at 01:12

## 2017-12-21 RX ADMIN — DOCUSATE SODIUM AND SENNOSIDES 1 TABLET: 8.6; 5 TABLET, FILM COATED ORAL at 09:12

## 2017-12-21 RX ADMIN — HALOPERIDOL LACTATE 1 MG: 5 INJECTION, SOLUTION INTRAMUSCULAR at 05:12

## 2017-12-21 RX ADMIN — LISINOPRIL 20 MG: 20 TABLET ORAL at 09:12

## 2017-12-21 RX ADMIN — PHENYLEPHRINE HYDROCHLORIDE 100 MCG: 10 INJECTION INTRAVENOUS at 01:12

## 2017-12-21 RX ADMIN — LIDOCAINE HYDROCHLORIDE 100 MG: 20 INJECTION, SOLUTION INTRAVENOUS at 01:12

## 2017-12-21 RX ADMIN — ATORVASTATIN CALCIUM 20 MG: 10 TABLET, FILM COATED ORAL at 09:12

## 2017-12-21 RX ADMIN — SODIUM CHLORIDE, SODIUM LACTATE, POTASSIUM CHLORIDE, AND CALCIUM CHLORIDE: .6; .31; .03; .02 INJECTION, SOLUTION INTRAVENOUS at 03:12

## 2017-12-21 RX ADMIN — SODIUM CHLORIDE, SODIUM LACTATE, POTASSIUM CHLORIDE, AND CALCIUM CHLORIDE: .6; .31; .03; .02 INJECTION, SOLUTION INTRAVENOUS at 02:12

## 2017-12-21 RX ADMIN — PHENYLEPHRINE HYDROCHLORIDE 100 MCG: 10 INJECTION INTRAVENOUS at 02:12

## 2017-12-21 RX ADMIN — ACETAMINOPHEN 1000 MG: 10 INJECTION, SOLUTION INTRAVENOUS at 04:12

## 2017-12-21 RX ADMIN — FENTANYL CITRATE 50 MCG: 50 INJECTION INTRAMUSCULAR; INTRAVENOUS at 03:12

## 2017-12-21 RX ADMIN — HALOPERIDOL LACTATE 1 MG: 5 INJECTION, SOLUTION INTRAMUSCULAR at 09:12

## 2017-12-21 NOTE — NURSING
Patient arrived to [facility] unit via stretcher. Transferred to bed per 2 assist. No complaints at this time. IV fluids infusing. Will continue to monitor patient.

## 2017-12-21 NOTE — PROGRESS NOTES
[facility] Hospital Medicine  Progress Note    Patient Name: Oksana Estrada  MRN: [id number]  Patient Class: IP- Inpatient   Admission Date: 12/20/2017  Length of Stay: 1 days  Attending Physician: Freddie Mccarthy MD  Primary Care Provider: Red Griffith MD        Subjective:     Principal Problem:Closed fracture of distal end of right femur    HPI:  Mrs. Oksana Estrada is a 87 y.o. female with essential hypertension, hyperlipidemia (.0 May 2018), anemia of chronic disease, dementia, history of CVA, and history of subdural hematoma who presents to [facility] ED with complaints of fall today.  She had some right leg pain after a fall today while she was trying to get out of bed to use the restroom.  EMS was activated and she was given fentanyl for pain and transported to this facility.  Further history is limited at this time.    Hospital Course:  Mrs. Oksana Estrada is a 87 y.o. female with essential hypertension, hyperlipidemia (.0 May 2018), anemia of chronic disease, dementia, history of CVA, and history of subdural hematoma who presents to [facility] ED with complaints of fall .  She had some right leg pain after a fall  while she was trying to get out of bed to use the restroom.  EMS was activated and she was given fentanyl for pain and transported to this facility.X ray showed Acute comminuted fracture distal right femur just above the knee arthroplasty,she will have operation today per Ortho.her pain is controlled at this time.    Past Medical History:   Diagnosis Date    Dementia     Depression     Hypertension     Stroke        Past Surgical History:   Procedure Laterality Date    BREAST SURGERY      JOINT REPLACEMENT      bilateral knee replacement       Review of patient's allergies indicates:   Allergen Reactions    Codeine     Penicillins        No current facility-administered medications on file prior to encounter.      Current Outpatient  Prescriptions on File Prior to Encounter   Medication Sig    alendronate (FOSAMAX) 70 MG tablet Take 1 tablet (70 mg total) by mouth every 7 days.    atorvastatin (LIPITOR) 20 MG tablet Take 1 tablet (20 mg total) by mouth once daily.    chlordiazepoxide-clidinium 5-2.5 mg (LIBRAX) 5-2.5 mg Cap Take 1 capsule by mouth 3 (three) times daily as needed. For irritable bowel syndrome    chlorthalidone (HYGROTEN) 25 MG Tab Take 25 mg by mouth daily as needed.    cholestyramine-aspartame (QUESTRAN LIGHT) 4 gram PwPk Take 1 packet (4 g total) by mouth 2 (two) times daily. As needed for diarrhea    donepezil (ARICEPT) 5 MG tablet Take 1 tablet (5 mg total) by mouth every evening. For memory    escitalopram oxalate (LEXAPRO) 20 MG tablet Take 1 tablet (20 mg total) by mouth once daily.    lisinopril (PRINIVIL,ZESTRIL) 20 MG tablet Take 1 tablet (20 mg total) by mouth once daily.    quetiapine (SEROQUEL) 25 MG Tab Take 1 tablet (25 mg total) by mouth every evening.    terbinafine HCl (LAMISIL) 250 mg tablet Take 250 mg by mouth once daily.    tolterodine (DETROL) 1 MG Tab Take 1 tablet (1 mg total) by mouth 2 (two) times daily.     Family History     None        Social History Main Topics    Smoking status: Former Smoker     Types: Cigarettes    Smokeless tobacco: Never Used      Comment: quit 25 years ago    Alcohol use No    Drug use: No    Sexual activity: No     Review of Systems   Unable to perform ROS: Dementia     Objective:     Vital Signs (Most Recent):  Temp: 98.4 °F (36.9 °C) (12/21/17 0754)  Pulse: 81 (12/21/17 0754)  Resp: 19 (12/21/17 0754)  BP: (!) 171/75 (12/21/17 0754)  SpO2: 98 % (12/21/17 0754) Vital Signs (24h Range):  Temp:  [98 °F (36.7 °C)-98.6 °F (37 °C)] 98.4 °F (36.9 °C)  Pulse:  [68-90] 81  Resp:  [16-19] 19  SpO2:  [95 %-99 %] 98 %  BP: (150-180)/(63-75) 171/75     Weight: 69.3 kg (152 lb 12.8 oz)  Body mass index is 25.43 kg/m².    Physical Exam   Constitutional: She appears  "well-developed and well-nourished. No distress.   HENT:   Head: Normocephalic and atraumatic.   Right Ear: External ear normal.   Left Ear: External ear normal.   Nose: Nose normal.   Eyes: Right eye exhibits no discharge. Left eye exhibits no discharge.   Neck: Normal range of motion.   Cardiovascular: Normal rate, regular rhythm, normal heart sounds and intact distal pulses.  Exam reveals no gallop and no friction rub.    No murmur heard.  Pulmonary/Chest: Effort normal and breath sounds normal. No respiratory distress. She has no wheezes. She has no rales. She exhibits no tenderness.   Abdominal: Soft. Bowel sounds are normal. She exhibits no distension. There is no tenderness. There is no rebound and no guarding.   Musculoskeletal: Normal range of motion. She exhibits no edema.   Neurological:   Alert bur oriented only to self   Skin: Skin is warm and dry. She is not diaphoretic. No erythema.   Nursing note and vitals reviewed.          Significant Labs: All pertinent labs within the past 24 hours have been reviewed.    Significant Imaging: I have reviewed and interpreted all pertinent imaging results/findings within the past 24 hours.    Assessment/Plan:      * Closed fracture of distal end of right femur    Patient had a fall and has sustained a fracture.  Plain radiograph was significant for "[a]cute comminuted fracture distal right femur just above the knee arthroplasty."  Her Revised Cardiac Risk Index suggests that she is at low risk for perioperative cardiac events.Echo on 10/2017 show preserved EF, Will consult Orthopedic Surgery for evaluation and further recommendations.will have operation today.        Anemia of chronic disease    The patient's H/H is stable and consistent with previous laboratory measurements, and the patient exhibits no signs or symptoms of acute bleeding; there is no indication for transfusion.  Will continue to monitor.        Hyperlipidemia    Poorly-controlled; will continue " patient's home regimen of atorvastatin.        Essential hypertension    Patient's blood pressure is poorly-controlled; will continue home regimen of chlorthalidone and lisinopril, and provide as-needed clonidine.        History of CVA (cerebrovascular accident)    Stable; will continue her home regimen of atorvastatin.        Dementia without behavioral disturbance    Stable; will continue her home regimen of donepezil.        History of subdural hematoma    Stable; there are no acute issues.          VTE Risk Mitigation         Ordered     Medium Risk of VTE  Once      12/20/17 2203     Place sequential compression device  Until discontinued      12/20/17 1758     Place VENUS hose  Until discontinued      12/20/17 1758              Freddie Mccarthy MD  Department of Hospital Medicine   [facility]

## 2017-12-21 NOTE — PLAN OF CARE
Problem: Fall Risk (Adult)  Intervention: Patient Rounds   12/20/17 1800   Safety Interventions   Patient Rounds bed in low position;bed wheels locked;clutter free environment maintained;call light in reach;ID band on;visualized patient;toileting offered;placement of personal items at bedside         Problem: Pressure Ulcer Risk (Talha Scale) (Adult,Obstetrics,Pediatric)  Intervention: Maintain Head of Bed Elevation Less Than 30 Degrees as Tolerated   12/20/17 184   Positioning   Head of Bed (HOB) HOB at 30-45 degrees

## 2017-12-21 NOTE — HPI
Mrs. Oksana Estrada is a 87 y.o. female with essential hypertension, hyperlipidemia (.0 May 2018), anemia of chronic disease, dementia, history of CVA, and history of subdural hematoma who presents to [facility] ED with complaints of fall today.  She had some right leg pain after a fall today while she was trying to get out of bed to use the restroom.  EMS was activated and she was given fentanyl for pain and transported to this facility.  Further history is limited at this time.

## 2017-12-21 NOTE — HOSPITAL COURSE
X ray showed acute comminuted fracture distal right femur just above the knee arthroplasty. She was taken for ORIF right hip on 12.21.17. PT/OT consulted. She has dementia and briefly required restraints. PT/OT evaluated patient and recommended SNF.  SW consulted. She had acute blood loss anemia as to be expected with surgery and was transfused RBCs. She had urinary retention requiring a [place] - voiding trial 12/26 passed with adequate voiding and no further complications.  Patient is currently afebrile and hemodynamically stable.  She will be discharged to SNF once arranged.

## 2017-12-21 NOTE — ASSESSMENT & PLAN NOTE
"Patient had a fall and has sustained a fracture.  Plain radiograph was significant for "[a]cute comminuted fracture distal right femur just above the knee arthroplasty."  Her Revised Cardiac Risk Index suggests that she is at low risk for perioperative cardiac events.  Will consult Orthopedic Surgery for evaluation and further recommendations.  "

## 2017-12-21 NOTE — ANESTHESIA PREPROCEDURE EVALUATION
12/21/2017  Oksana Estrada is a 87 y.o., female.    Pre-op Assessment     I have reviewed the Nursing Notes.      Review of Systems  Cardiovascular:   Hypertension Denies Valvular problems/Murmurs.  hyperlipidemia    Neurological:   CVA  Dementia

## 2017-12-21 NOTE — PROGRESS NOTES
Pt. With displaced distal femur fracture above a TKA. I talked with her son. He consents to surgery and will come in to sign the consent. I will try to this today.

## 2017-12-21 NOTE — SUBJECTIVE & OBJECTIVE
Past Medical History:   Diagnosis Date    Dementia     Depression     Hypertension     Stroke        Past Surgical History:   Procedure Laterality Date    BREAST SURGERY      JOINT REPLACEMENT      bilateral knee replacement       Review of patient's allergies indicates:   Allergen Reactions    Codeine     Penicillins        No current facility-administered medications on file prior to encounter.      Current Outpatient Prescriptions on File Prior to Encounter   Medication Sig    alendronate (FOSAMAX) 70 MG tablet Take 1 tablet (70 mg total) by mouth every 7 days.    atorvastatin (LIPITOR) 20 MG tablet Take 1 tablet (20 mg total) by mouth once daily.    chlordiazepoxide-clidinium 5-2.5 mg (LIBRAX) 5-2.5 mg Cap Take 1 capsule by mouth 3 (three) times daily as needed. For irritable bowel syndrome    chlorthalidone (HYGROTEN) 25 MG Tab Take 25 mg by mouth daily as needed.    cholestyramine-aspartame (QUESTRAN LIGHT) 4 gram PwPk Take 1 packet (4 g total) by mouth 2 (two) times daily. As needed for diarrhea    donepezil (ARICEPT) 5 MG tablet Take 1 tablet (5 mg total) by mouth every evening. For memory    escitalopram oxalate (LEXAPRO) 20 MG tablet Take 1 tablet (20 mg total) by mouth once daily.    lisinopril (PRINIVIL,ZESTRIL) 20 MG tablet Take 1 tablet (20 mg total) by mouth once daily.    quetiapine (SEROQUEL) 25 MG Tab Take 1 tablet (25 mg total) by mouth every evening.    terbinafine HCl (LAMISIL) 250 mg tablet Take 250 mg by mouth once daily.    tolterodine (DETROL) 1 MG Tab Take 1 tablet (1 mg total) by mouth 2 (two) times daily.     Family History     None        Social History Main Topics    Smoking status: Former Smoker     Types: Cigarettes    Smokeless tobacco: Never Used      Comment: quit 25 years ago    Alcohol use No    Drug use: No    Sexual activity: No     Review of Systems   Unable to perform ROS: Dementia     Objective:     Vital Signs (Most Recent):  Temp: 98.4 °F (36.9 °C)  (12/21/17 0754)  Pulse: 81 (12/21/17 0754)  Resp: 19 (12/21/17 0754)  BP: (!) 171/75 (12/21/17 0754)  SpO2: 98 % (12/21/17 0754) Vital Signs (24h Range):  Temp:  [98 °F (36.7 °C)-98.6 °F (37 °C)] 98.4 °F (36.9 °C)  Pulse:  [68-90] 81  Resp:  [16-19] 19  SpO2:  [95 %-99 %] 98 %  BP: (150-180)/(63-75) 171/75     Weight: 69.3 kg (152 lb 12.8 oz)  Body mass index is 25.43 kg/m².    Physical Exam   Constitutional: She appears well-developed and well-nourished. No distress.   HENT:   Head: Normocephalic and atraumatic.   Right Ear: External ear normal.   Left Ear: External ear normal.   Nose: Nose normal.   Eyes: Right eye exhibits no discharge. Left eye exhibits no discharge.   Neck: Normal range of motion.   Cardiovascular: Normal rate, regular rhythm, normal heart sounds and intact distal pulses.  Exam reveals no gallop and no friction rub.    No murmur heard.  Pulmonary/Chest: Effort normal and breath sounds normal. No respiratory distress. She has no wheezes. She has no rales. She exhibits no tenderness.   Abdominal: Soft. Bowel sounds are normal. She exhibits no distension. There is no tenderness. There is no rebound and no guarding.   Musculoskeletal: Normal range of motion. She exhibits no edema.   Neurological:   Alert bur oriented only to self   Skin: Skin is warm and dry. She is not diaphoretic. No erythema.   Nursing note and vitals reviewed.          Significant Labs: All pertinent labs within the past 24 hours have been reviewed.    Significant Imaging: I have reviewed and interpreted all pertinent imaging results/findings within the past 24 hours.

## 2017-12-21 NOTE — OR NURSING
RLE ace wrapped, dressing dry and intact.  Positive movement and sensation, warm to touch, brisk refill.

## 2017-12-21 NOTE — TRANSFER OF CARE
"Anesthesia Transfer of Care Note    Patient: Oksana Estrada    Procedure(s) Performed: Procedure(s) (LRB):  OPEN REDUCTION INTERNAL FIXATION-HIP (Right)    Patient location: PACU    Anesthesia Type: general    Transport from OR: Transported from OR on room air with adequate spontaneous ventilation    Post pain: adequate analgesia    Post assessment: no apparent anesthetic complications    Level of consciousness: awake    Nausea/Vomiting: no nausea/vomiting    Complications: none    Transfer of care protocol was followed      Last vitals:   Visit Vitals  BP (!) 149/65 (BP Location: Left arm, Patient Position: Lying)   Pulse 85   Temp 37.6 °C (99.7 °F) (Oral)   Resp 19   Ht 5' 5" (1.651 m)   Wt 69.3 kg (152 lb 12.8 oz)   SpO2 95%   Breastfeeding? No   BMI 25.43 kg/m²     "

## 2017-12-21 NOTE — ASSESSMENT & PLAN NOTE
Patient's blood pressure is poorly-controlled; will continue home regimen of chlorthalidone and lisinopril, and provide as-needed clonidine.

## 2017-12-21 NOTE — H&P
[facility] Hospital Medicine  History & Physical    Patient Name: Oksana Estrada  MRN: [id number]  Admission Date: 12/20/2017  Attending Physician: Freddie Mccarthy MD   Primary Care Provider: Red Griffith MD         Patient information was obtained from ER records.     Subjective:     Principal Problem:Closed fracture of distal end of right femur    Chief Complaint: Fall this morning.    HPI: Mrs. Oksana Estrada is a 87 y.o. female with essential hypertension, hyperlipidemia (.0 May 2018), anemia of chronic disease, dementia, history of CVA, and history of subdural hematoma who presents to [facility] ED with complaints of fall today.  She had some right leg pain after a fall today while she was trying to get out of bed to use the restroom.  EMS was activated and she was given fentanyl for pain and transported to this facility.  Further history is limited at this time.    Chart Review:  Previous Hospitalizations  Date Hospital Diagnosis   Oct 2017 [facility] [place]- Elevated troponin - negative work-up   Feb 2017 [facility] [place] Subdural hematoma      Outpatient Follow-Up  Date of Visit Physician Service   Mar 2017 Rde Griffith MD Primary Care   Feb 2017 Brady Chapin MD Orthopedic Surgery    Feb 2017 Trey Shirley MD Neurosurgery    Feb 2013 Pretty Ponce DPM Podiatry      Past Medical History:   Diagnosis Date    Dementia     Depression     Hypertension     Stroke        Past Surgical History:   Procedure Laterality Date    BREAST SURGERY      JOINT REPLACEMENT      bilateral knee replacement       Review of patient's allergies indicates:   Allergen Reactions    Codeine     Penicillins        No current facility-administered medications on file prior to encounter.      Current Outpatient Prescriptions on File Prior to Encounter   Medication Sig    alendronate (FOSAMAX) 70 MG tablet Take 1 tablet (70 mg total) by mouth every 7 days.    atorvastatin (LIPITOR) 20 MG tablet Take  1 tablet (20 mg total) by mouth once daily.    chlordiazepoxide-clidinium 5-2.5 mg (LIBRAX) 5-2.5 mg Cap Take 1 capsule by mouth 3 (three) times daily as needed. For irritable bowel syndrome    chlorthalidone (HYGROTEN) 25 MG Tab Take 25 mg by mouth daily as needed.    cholestyramine-aspartame (QUESTRAN LIGHT) 4 gram PwPk Take 1 packet (4 g total) by mouth 2 (two) times daily. As needed for diarrhea    donepezil (ARICEPT) 5 MG tablet Take 1 tablet (5 mg total) by mouth every evening. For memory    escitalopram oxalate (LEXAPRO) 20 MG tablet Take 1 tablet (20 mg total) by mouth once daily.    lisinopril (PRINIVIL,ZESTRIL) 20 MG tablet Take 1 tablet (20 mg total) by mouth once daily.    quetiapine (SEROQUEL) 25 MG Tab Take 1 tablet (25 mg total) by mouth every evening.    terbinafine HCl (LAMISIL) 250 mg tablet Take 250 mg by mouth once daily.    tolterodine (DETROL) 1 MG Tab Take 1 tablet (1 mg total) by mouth 2 (two) times daily.     Family History     None        Social History Main Topics    Smoking status: Former Smoker     Types: Cigarettes    Smokeless tobacco: Never Used      Comment: quit 25 years ago    Alcohol use No    Drug use: No    Sexual activity: No     Review of Systems   Unable to perform ROS: Dementia     Objective:     Vital Signs (Most Recent):  Temp: 98.5 °F (36.9 °C) (12/20/17 1933)  Pulse: 83 (12/20/17 1933)  Resp: 18 (12/20/17 1933)  BP: (!) 178/75 (12/20/17 1933)  SpO2: 95 % (12/20/17 1933) Vital Signs (24h Range):  Temp:  [98.3 °F (36.8 °C)-98.6 °F (37 °C)] 98.5 °F (36.9 °C)  Pulse:  [68-83] 83  Resp:  [16-18] 18  SpO2:  [95 %-99 %] 95 %  BP: (150-180)/(63-75) 178/75     Weight: 69.3 kg (152 lb 12.8 oz)  Body mass index is 25.43 kg/m².    Physical Exam   Constitutional: She appears well-developed and well-nourished. No distress.   HENT:   Head: Normocephalic and atraumatic.   Right Ear: External ear normal.   Left Ear: External ear normal.   Nose: Nose normal.   Eyes: Right  "eye exhibits no discharge. Left eye exhibits no discharge.   Neck: Normal range of motion.   Cardiovascular: Normal rate, regular rhythm, normal heart sounds and intact distal pulses.  Exam reveals no gallop and no friction rub.    No murmur heard.  Pulmonary/Chest: Effort normal and breath sounds normal. No respiratory distress. She has no wheezes. She has no rales. She exhibits no tenderness.   Abdominal: Soft. Bowel sounds are normal. She exhibits no distension. There is no tenderness. There is no rebound and no guarding.   Musculoskeletal: Normal range of motion. She exhibits no edema.   Neurological:   Alert bur oriented only to self   Skin: Skin is warm and dry. She is not diaphoretic. No erythema.   Nursing note and vitals reviewed.          Significant Labs: All pertinent labs within the past 24 hours have been reviewed.    Significant Imaging: I have reviewed and interpreted all pertinent imaging results/findings within the past 24 hours.    Assessment/Plan:     * Closed fracture of distal end of right femur    Patient had a fall and has sustained a fracture.  Plain radiograph was significant for "[a]cute comminuted fracture distal right femur just above the knee arthroplasty."  Her Revised Cardiac Risk Index suggests that she is at low risk for perioperative cardiac events.  Will consult Orthopedic Surgery for evaluation and further recommendations.        Essential hypertension    Patient's blood pressure is poorly-controlled; will continue home regimen of chlorthalidone and lisinopril, and provide as-needed clonidine.        Hyperlipidemia    Poorly-controlled; will continue patient's home regimen of atorvastatin.        Anemia of chronic disease    The patient's H/H is stable and consistent with previous laboratory measurements, and the patient exhibits no signs or symptoms of acute bleeding; there is no indication for transfusion.  Will continue to monitor.        Dementia    Stable; will continue her " home regimen of donepezil.        History of CVA (cerebrovascular accident)    Stable; will continue her home regimen of atorvastatin.        History of subdural hematoma    Stable; there are no acute issues.          VTE Risk Mitigation         Ordered     Medium Risk of VTE  Once      12/20/17 2203     Place sequential compression device  Until discontinued      12/20/17 1758     Place VENUS hose  Until discontinued      12/20/17 1758             Total time spent on case: 45 minutes.        Vivi Hoover M.D.  Staff Nocturnist  Department of Hospital Medicine  [facility]  Pager: [phone]

## 2017-12-21 NOTE — ASSESSMENT & PLAN NOTE
"Patient had a fall and has sustained a fracture.  Plain radiograph was significant for "[a]cute comminuted fracture distal right femur just above the knee arthroplasty."  Her Revised Cardiac Risk Index suggests that she is at low risk for perioperative cardiac events.Echo on 10/2017 show preserved EF, Will consult Orthopedic Surgery for evaluation and further recommendations.will have operation today.  "

## 2017-12-21 NOTE — SUBJECTIVE & OBJECTIVE
Past Medical History:   Diagnosis Date    Dementia     Depression     Hypertension     Stroke        Past Surgical History:   Procedure Laterality Date    BREAST SURGERY      JOINT REPLACEMENT      bilateral knee replacement       Review of patient's allergies indicates:   Allergen Reactions    Codeine     Penicillins        No current facility-administered medications on file prior to encounter.      Current Outpatient Prescriptions on File Prior to Encounter   Medication Sig    alendronate (FOSAMAX) 70 MG tablet Take 1 tablet (70 mg total) by mouth every 7 days.    atorvastatin (LIPITOR) 20 MG tablet Take 1 tablet (20 mg total) by mouth once daily.    chlordiazepoxide-clidinium 5-2.5 mg (LIBRAX) 5-2.5 mg Cap Take 1 capsule by mouth 3 (three) times daily as needed. For irritable bowel syndrome    chlorthalidone (HYGROTEN) 25 MG Tab Take 25 mg by mouth daily as needed.    cholestyramine-aspartame (QUESTRAN LIGHT) 4 gram PwPk Take 1 packet (4 g total) by mouth 2 (two) times daily. As needed for diarrhea    donepezil (ARICEPT) 5 MG tablet Take 1 tablet (5 mg total) by mouth every evening. For memory    escitalopram oxalate (LEXAPRO) 20 MG tablet Take 1 tablet (20 mg total) by mouth once daily.    lisinopril (PRINIVIL,ZESTRIL) 20 MG tablet Take 1 tablet (20 mg total) by mouth once daily.    quetiapine (SEROQUEL) 25 MG Tab Take 1 tablet (25 mg total) by mouth every evening.    terbinafine HCl (LAMISIL) 250 mg tablet Take 250 mg by mouth once daily.    tolterodine (DETROL) 1 MG Tab Take 1 tablet (1 mg total) by mouth 2 (two) times daily.     Family History     None        Social History Main Topics    Smoking status: Former Smoker     Types: Cigarettes    Smokeless tobacco: Never Used      Comment: quit 25 years ago    Alcohol use No    Drug use: No    Sexual activity: No     Review of Systems   Unable to perform ROS: Dementia     Objective:     Vital Signs (Most Recent):  Temp: 98.5 °F (36.9 °C)  (12/20/17 1933)  Pulse: 83 (12/20/17 1933)  Resp: 18 (12/20/17 1933)  BP: (!) 178/75 (12/20/17 1933)  SpO2: 95 % (12/20/17 1933) Vital Signs (24h Range):  Temp:  [98.3 °F (36.8 °C)-98.6 °F (37 °C)] 98.5 °F (36.9 °C)  Pulse:  [68-83] 83  Resp:  [16-18] 18  SpO2:  [95 %-99 %] 95 %  BP: (150-180)/(63-75) 178/75     Weight: 69.3 kg (152 lb 12.8 oz)  Body mass index is 25.43 kg/m².    Physical Exam   Constitutional: She appears well-developed and well-nourished. No distress.   HENT:   Head: Normocephalic and atraumatic.   Right Ear: External ear normal.   Left Ear: External ear normal.   Nose: Nose normal.   Eyes: Right eye exhibits no discharge. Left eye exhibits no discharge.   Neck: Normal range of motion.   Cardiovascular: Normal rate, regular rhythm, normal heart sounds and intact distal pulses.  Exam reveals no gallop and no friction rub.    No murmur heard.  Pulmonary/Chest: Effort normal and breath sounds normal. No respiratory distress. She has no wheezes. She has no rales. She exhibits no tenderness.   Abdominal: Soft. Bowel sounds are normal. She exhibits no distension. There is no tenderness. There is no rebound and no guarding.   Musculoskeletal: Normal range of motion. She exhibits no edema.   Neurological:   Alert bur oriented only to self   Skin: Skin is warm and dry. She is not diaphoretic. No erythema.   Nursing note and vitals reviewed.          Significant Labs: All pertinent labs within the past 24 hours have been reviewed.    Significant Imaging: I have reviewed and interpreted all pertinent imaging results/findings within the past 24 hours.

## 2017-12-21 NOTE — PLAN OF CARE
Problem: Patient Care Overview  Goal: Plan of Care Review  Outcome: Ongoing (interventions implemented as appropriate)   12/21/17 0524   Confusion   Plan Of Care Reviewed With patient   Patient kept disrobing and became combative when trying to assist her to dress.  Unable to calm otherwise.  Patient now has haldol 1 mg iv q 6 for agitation   12/21/17 0524   Fall risk   Plan Of Care Reviewed With patient   Patient is a high fall risk, but consistently tries to get out of bed.

## 2017-12-21 NOTE — CONSULTS
DATE OF CONSULTATION:  12/21/2017    HISTORY OF PRESENT ILLNESS:  An 87-year-old female admitted to the Emergency   Room yesterday due to fracture of the right distal femur above a total knee   arthroplasty.    The patient lives at [facility].  She fell out of the wheelchair.  She was   brought to [facility] ER.  Diagnosis was made of fracture, right distal femur.    PAST MEDICAL HISTORY:  The patient has a past history of dementia, depression,   hypertension and previous stroke.    PHYSICAL EXAMINATION:  GENERAL:  The patient is in bed.  She is awake.  She is not really oriented.  EXTREMITIES:  She has a displaced angulated right knee area.  Pulses are 2/4.    She moves her foot up and down.    DIAGNOSTIC DATA:  X-rays show displaced fracture of the right distal femur above   a total knee arthroplasty.    IMPRESSION AND PLAN:  I had a long discussion with her son.  She was ambulating.    He agrees to surgery for pain control and to try to allow her to get up at   least for transfers.  Plan is to do open reduction and internal fixation of the   right distal femur.      RLS/IN  dd: 12/21/2017 08:09:08 (CST)  td: 12/21/2017 08:26:22 (CST)  Doc ID   #[id number]  Job ID #[id number]    CC:

## 2017-12-22 LAB
BACTERIA UR CULT: NO GROWTH
BASOPHILS # BLD AUTO: 0 K/UL
BASOPHILS NFR BLD: 0 %
DIFFERENTIAL METHOD: ABNORMAL
EOSINOPHIL # BLD AUTO: 0 K/UL
EOSINOPHIL NFR BLD: 0 %
ERYTHROCYTE [DISTWIDTH] IN BLOOD BY AUTOMATED COUNT: 15.4 %
HCT VFR BLD AUTO: 27.5 %
HGB BLD-MCNC: 9.4 G/DL
LYMPHOCYTES # BLD AUTO: 0.7 K/UL
LYMPHOCYTES NFR BLD: 6.3 %
MCH RBC QN AUTO: 30.2 PG
MCHC RBC AUTO-ENTMCNC: 34.2 G/DL
MCV RBC AUTO: 88 FL
MONOCYTES # BLD AUTO: 0.8 K/UL
MONOCYTES NFR BLD: 7.6 %
NEUTROPHILS # BLD AUTO: 9.1 K/UL
NEUTROPHILS NFR BLD: 86.1 %
PLATELET # BLD AUTO: 227 K/UL
PMV BLD AUTO: 10.8 FL
RBC # BLD AUTO: 3.11 M/UL
WBC # BLD AUTO: 10.54 K/UL

## 2017-12-22 PROCEDURE — 25000003 PHARM REV CODE 250: Performed by: EMERGENCY MEDICINE

## 2017-12-22 PROCEDURE — 94761 N-INVAS EAR/PLS OXIMETRY MLT: CPT

## 2017-12-22 PROCEDURE — 85025 COMPLETE CBC W/AUTO DIFF WBC: CPT

## 2017-12-22 PROCEDURE — G8978 MOBILITY CURRENT STATUS: HCPCS | Mod: CL

## 2017-12-22 PROCEDURE — 25000003 PHARM REV CODE 250: Performed by: INTERNAL MEDICINE

## 2017-12-22 PROCEDURE — 97166 OT EVAL MOD COMPLEX 45 MIN: CPT

## 2017-12-22 PROCEDURE — 27000221 HC OXYGEN, UP TO 24 HOURS

## 2017-12-22 PROCEDURE — 25000003 PHARM REV CODE 250: Performed by: ORTHOPAEDIC SURGERY

## 2017-12-22 PROCEDURE — 97162 PT EVAL MOD COMPLEX 30 MIN: CPT

## 2017-12-22 PROCEDURE — 12000002 HC ACUTE/MED SURGE SEMI-PRIVATE ROOM

## 2017-12-22 PROCEDURE — 63600175 PHARM REV CODE 636 W HCPCS: Performed by: HOSPITALIST

## 2017-12-22 PROCEDURE — 36415 COLL VENOUS BLD VENIPUNCTURE: CPT

## 2017-12-22 PROCEDURE — G8979 MOBILITY GOAL STATUS: HCPCS | Mod: CK

## 2017-12-22 RX ORDER — ENOXAPARIN SODIUM 100 MG/ML
40 INJECTION SUBCUTANEOUS EVERY 24 HOURS
Status: DISCONTINUED | OUTPATIENT
Start: 2017-12-22 | End: 2017-12-28 | Stop reason: HOSPADM

## 2017-12-22 RX ADMIN — DOCUSATE SODIUM AND SENNOSIDES 1 TABLET: 8.6; 5 TABLET, FILM COATED ORAL at 09:12

## 2017-12-22 RX ADMIN — DONEPEZIL HYDROCHLORIDE 5 MG: 5 TABLET, FILM COATED ORAL at 10:12

## 2017-12-22 RX ADMIN — MUPIROCIN 1 G: 20 OINTMENT TOPICAL at 10:12

## 2017-12-22 RX ADMIN — QUETIAPINE FUMARATE 25 MG: 25 TABLET, FILM COATED ORAL at 10:12

## 2017-12-22 RX ADMIN — FAMOTIDINE 20 MG: 20 TABLET, FILM COATED ORAL at 09:12

## 2017-12-22 RX ADMIN — CHLORTHALIDONE 25 MG: 25 TABLET ORAL at 09:12

## 2017-12-22 RX ADMIN — FAMOTIDINE 20 MG: 20 TABLET, FILM COATED ORAL at 10:12

## 2017-12-22 RX ADMIN — ENOXAPARIN SODIUM 40 MG: 100 INJECTION SUBCUTANEOUS at 06:12

## 2017-12-22 RX ADMIN — DOCUSATE SODIUM AND SENNOSIDES 1 TABLET: 8.6; 5 TABLET, FILM COATED ORAL at 10:12

## 2017-12-22 RX ADMIN — ATORVASTATIN CALCIUM 20 MG: 10 TABLET, FILM COATED ORAL at 09:12

## 2017-12-22 RX ADMIN — LISINOPRIL 20 MG: 20 TABLET ORAL at 09:12

## 2017-12-22 NOTE — PT/OT/SLP EVAL
Occupational Therapy   Evaluation    Name: Oksana Estrada  MRN: [id number]  Admitting Diagnosis:  Closed fracture of distal end of right femur 1 Day Post-Op    Recommendations:     Discharge Recommendations: nursing facility, skilled  Discharge Equipment Recommendations:  none  Barriers to discharge:  Decreased caregiver support    History:     Occupational Profile:  Living Environment: As per chart pt lives in assisted living  Previous level of function: requires assistance  Roles and Routines: unable to determine  Equipment Owned:  shower chair, walker, rolling, wheelchair  Assistance upon Discharge: to be determined    Past Medical History:   Diagnosis Date    Dementia     Depression     Hypertension     Stroke        Past Surgical History:   Procedure Laterality Date    BREAST SURGERY      JOINT REPLACEMENT      bilateral knee replacement       Subjective     Chief Complaint: pain and return to bed  Patient/Family stated goals: none stated  Communicated with: nurse prior to session.  Pain/Comfort:  · Pain Rating 1:  (pain but pt unable to state)    Objective:     Patient found with: pressure relief boots, telemetry, peripheral IV    General Precautions: Standard, fall   Orthopedic Precautions:Full weight bearing   Braces: N/A     Occupational Performance:    Bed Mobility:    · Patient completed Rolling/Turning to Left with  dependent and 2  persons  · Patient completed Rolling/Turning to Right with dependent and 2 persons  · Patient completed Scooting/Bridging with dependent and 2 persons  · Patient completed Supine to Sit with dependent  · Patient completed Sit to Supine with dependent and 2 persons    Functional Mobility/Transfers:  · Patient completed Sit <> Stand Transfer with total assistance  with  rolling walker     Activities of Daily Living:  · UB Dressing: maximal assistance   edge of bed    Cognitive/Visual Perceptual:  Cognitive/Psychosocial Skills:     -       Oriented to: Person and Place  "  -       Follows Commands/attention:Easily distracted  -       Communication: clear/fluent  -       Memory: Impaired STM and Impaired LTM  -       Safety awareness/insight to disability: impaired   -       Mood/Affect/Coping skills/emotional control: Appropriate to situation  Visual/Perceptual:      -Intact    Physical Exam:  Balance:    -        limited sitting edge of bed    Patient left supine with all lines intact, call button in reach and nurse notified    [facility] 6 Click:  [facility] Total Score: 8    Education:    Assessment:     Oksana Estrada is a 87 y.o. female with a medical diagnosis of Closed fracture of distal end of right femur.  She presents with need for continued therapy to address return to prior level.  Performance deficits affecting function are weakness, impaired endurance, impaired self care skills, impaired functional mobilty, gait instability, impaired balance, impaired cognition, decreased coordination, decreased upper extremity function, decreased lower extremity function, decreased safety awareness, decreased ROM, impaired joint extensibility, impaired muscle length.      Rehab Prognosis:  good; patient would benefit from acute skilled OT services to address these deficits and reach maximum level of function.         Clinical Decision Makin.  OT Mod:  "Pt evaluation falls under moderate complexity for evaluation coding due to identification of 3-5 performance deficits noted as stated above. Eval required Min/Mod assistance to complete on this date and detailed assessment(s) were utilized. Moreover, an expanded review of history and occupational profile obtained with additional review of cognitive, physical and psychosocial hx."     Plan:     Patient to be seen 4 x/week to address the above listed problems via self-care/home management, therapeutic activities, therapeutic exercises  · Plan of Care Expires: 18  · Plan of Care Reviewed with: patient    This Plan of care has " been discussed with the patient who was involved in its development and understands and is in agreement with the identified goals and treatment plan    GOALS:    Occupational Therapy Goals        Problem: Occupational Therapy Goal    Goal Priority Disciplines Outcome Interventions   Occupational Therapy Goal     OT, PT/OT Ongoing (interventions implemented as appropriate)    Description:  Goals to be met by: 1/5.18     Patient will increase functional independence with ADLs by performing:    Feeding with Contact Guard Assistance.  UE Dressing with Minimal Assistance.  Grooming while seated with Supervision and Stand-by Assistance.  Upper extremity exercise program x10 reps, with assistance as needed.                      Time Tracking:     OT Date of Treatment: 12/22/17  OT Start Time: 1127  OT Stop Time: 1155  OT Total Time (min): 28 min    Billable Minutes:Evaluation 26    Mellisa Stephens OT  12/22/2017

## 2017-12-22 NOTE — SUBJECTIVE & OBJECTIVE
Past Medical History:   Diagnosis Date    Dementia     Depression     Hypertension     Stroke        Past Surgical History:   Procedure Laterality Date    BREAST SURGERY      JOINT REPLACEMENT      bilateral knee replacement       Review of patient's allergies indicates:   Allergen Reactions    Codeine     Penicillins        No current facility-administered medications on file prior to encounter.      Current Outpatient Prescriptions on File Prior to Encounter   Medication Sig    alendronate (FOSAMAX) 70 MG tablet Take 1 tablet (70 mg total) by mouth every 7 days.    atorvastatin (LIPITOR) 20 MG tablet Take 1 tablet (20 mg total) by mouth once daily.    chlordiazepoxide-clidinium 5-2.5 mg (LIBRAX) 5-2.5 mg Cap Take 1 capsule by mouth 3 (three) times daily as needed. For irritable bowel syndrome    chlorthalidone (HYGROTEN) 25 MG Tab Take 25 mg by mouth daily as needed.    cholestyramine-aspartame (QUESTRAN LIGHT) 4 gram PwPk Take 1 packet (4 g total) by mouth 2 (two) times daily. As needed for diarrhea    donepezil (ARICEPT) 5 MG tablet Take 1 tablet (5 mg total) by mouth every evening. For memory    escitalopram oxalate (LEXAPRO) 20 MG tablet Take 1 tablet (20 mg total) by mouth once daily.    lisinopril (PRINIVIL,ZESTRIL) 20 MG tablet Take 1 tablet (20 mg total) by mouth once daily.    quetiapine (SEROQUEL) 25 MG Tab Take 1 tablet (25 mg total) by mouth every evening.    terbinafine HCl (LAMISIL) 250 mg tablet Take 250 mg by mouth once daily.    tolterodine (DETROL) 1 MG Tab Take 1 tablet (1 mg total) by mouth 2 (two) times daily.     Family History     None        Social History Main Topics    Smoking status: Former Smoker     Types: Cigarettes    Smokeless tobacco: Never Used      Comment: quit 25 years ago    Alcohol use No    Drug use: No    Sexual activity: No     Review of Systems   Unable to perform ROS: Dementia     Objective:     Vital Signs (Most Recent):  Temp: 98.3 °F (36.8 °C)  (12/22/17 0759)  Pulse: 92 (12/22/17 0759)  Resp: 19 (12/22/17 0759)  BP: 127/60 (12/22/17 0759)  SpO2: (!) 94 % (12/22/17 0805) Vital Signs (24h Range):  Temp:  [97 °F (36.1 °C)-100.2 °F (37.9 °C)] 98.3 °F (36.8 °C)  Pulse:  [] 92  Resp:  [12-20] 19  SpO2:  [92 %-98 %] 94 %  BP: (119-152)/(58-85) 127/60     Weight: 69.3 kg (152 lb 12.8 oz)  Body mass index is 25.43 kg/m².    Physical Exam   Constitutional: She appears well-developed and well-nourished. No distress.   HENT:   Head: Normocephalic and atraumatic.   Right Ear: External ear normal.   Left Ear: External ear normal.   Nose: Nose normal.   Eyes: Right eye exhibits no discharge. Left eye exhibits no discharge.   Neck: Normal range of motion.   Cardiovascular: Normal rate, regular rhythm, normal heart sounds and intact distal pulses.  Exam reveals no gallop and no friction rub.    No murmur heard.  Pulmonary/Chest: Effort normal and breath sounds normal. No respiratory distress. She has no wheezes. She has no rales. She exhibits no tenderness.   Abdominal: Soft. Bowel sounds are normal. She exhibits no distension. There is no tenderness. There is no rebound and no guarding.   Musculoskeletal: Normal range of motion. She exhibits no edema.   Neurological:   Alert bur oriented only to self   Skin: Skin is warm and dry. She is not diaphoretic. No erythema.   Nursing note and vitals reviewed.          Significant Labs: All pertinent labs within the past 24 hours have been reviewed.    Significant Imaging: I have reviewed and interpreted all pertinent imaging results/findings within the past 24 hours.

## 2017-12-22 NOTE — ANESTHESIA POSTPROCEDURE EVALUATION
"Anesthesia Post Evaluation    Patient: Oksana Estrada    Procedure(s) Performed: Procedure(s) (LRB):  OPEN REDUCTION INTERNAL FIXATION-HIP (Right)    Final Anesthesia Type: general  Patient location during evaluation: PACU  Patient participation: Yes- Able to Participate  Level of consciousness: awake and alert, oriented and awake  Post-procedure vital signs: reviewed and stable  Pain management: adequate  Airway patency: patent  PONV status at discharge: No PONV  Anesthetic complications: no      Cardiovascular status: blood pressure returned to baseline  Respiratory status: unassisted and spontaneous ventilation  Hydration status: euvolemic  Follow-up not needed.        Visit Vitals  /60   Pulse 92   Temp 36.8 °C (98.3 °F)   Resp 19   Ht 5' 5" (1.651 m)   Wt 69.3 kg (152 lb 12.8 oz)   SpO2 (!) 94%   Breastfeeding? No   BMI 25.43 kg/m²       Pain/Ivan Score: Pain Assessment Performed: Yes (12/22/2017  6:00 AM)  Presence of Pain: non-verbal indicators absent (12/22/2017  6:00 AM)  Pain Rating Prior to Med Admin: 0 (12/22/2017  6:00 AM)  Pain Rating Post Med Admin: 0 (12/21/2017  5:17 PM)  Ivan Score: 10 (12/21/2017  4:54 PM)      "

## 2017-12-22 NOTE — PLAN OF CARE
Problem: Physical Therapy Goal  Goal: Physical Therapy Goal  Goals to be met by: 2017     Patient will increase functional independence with mobility by performin. Supine to sit with Stand-by Assistance  2. Sit to stand transfer with Minimal Assistance  3. Bed to chair transfer with Minimal Assistance   4. Gait  To be assessed .   5. Sitting at edge of bed x15 minutes with Supervision  6. Lower extremity exercise program x10 reps per handout, with assistance as needed  Outcome: Ongoing (interventions implemented as appropriate)  Initial PT evaluation performed.  Pt could benefit from daily skilled PT services in order to maximize function prior to D/C.  SNF recommended.

## 2017-12-22 NOTE — PLAN OF CARE
Problem: Occupational Therapy Goal  Goal: Occupational Therapy Goal  Goals to be met by: 1/5.18     Patient will increase functional independence with ADLs by performing:    Feeding with Contact Guard Assistance.  UE Dressing with Minimal Assistance.  Grooming while seated with Supervision and Stand-by Assistance.  Upper extremity exercise program x10 reps, with assistance as needed.    Outcome: Ongoing (interventions implemented as appropriate)  OT completed evaluation.    REC: SNF

## 2017-12-22 NOTE — OP NOTE
DATE OF PROCEDURE:  12/21/2017    SURGEON:  Jose Murphy M.D.    ANESTHESIA:  General.    PREOPERATIVE DIAGNOSIS:  Comminuted displaced fracture of the right distal   femur, above total knee arthroplasty.    POSTOPERATIVE DIAGNOSIS:  Comminuted displaced fracture of the right distal   femur, above total knee arthroplasty.    PROCEDURE:  Open reduction and internal fixation of the right distal femur.    PROCEDURE IN DETAIL:  The patient was premedicated, brought to the Operating   Room.  General anesthesia was administered.  She was placed from the bed to the   fracture table.  Left leg was put up in the stirrup.  Right leg was in traction.    The fracture was manipulated.  The leg was prepped and draped in sterile   fashion using split sheaths proximally and distally with a bridge underneath for   sterile reasons.  Lateral incision was made.  Dissection carried down through   the skin and subcutaneous tissue from the knee joint up the thigh.  Hemostasis   was obtained as we went on all along.  The fascia radha was split longitudinally   in line with its fibers.  There was a defect around the knee where bone   fragments had come through.  The muscle was then released posteriorly, raised up   anteriorly.  The fracture site was identified.  By using traction, manipulating   the fragments, putting a crutch up under the back of the knee, the fracture was   reduced and stabilized with clamps.  Pins were placed to hold this.  An 8-hole   plate was then placed along the lateral aspect of the knee.  This was done using   the C-arm.  Screws were placed proximally and distally.  The plate was in good   position and hardware in good position, fracture in good position at the final   construct.  The area was thoroughly irrigated with the Pulsavac using 3 L of   saline, 30 mL of Betadine.  The incision was then closed first using the fascia   radha, then the subcutaneous tissues, staples on the skin.  Sterile dressings   were  applied.  There was no specimen.  Blood loss estimated 300 mL.  None was   replaced.  The patient was extubated, put on her bed, taken to Recovery in good   condition.      RLS/HN  dd: 12/21/2017 16:20:22 (CST)  td: 12/21/2017 21:19:30 (CST)  Doc ID   #[id number]  Job ID #[id number]    CC:

## 2017-12-22 NOTE — PLAN OF CARE
Problem: Patient Care Overview  Goal: Plan of Care Review  Outcome: Ongoing (interventions implemented as appropriate)   12/22/17 0427   Infection   Plan Of Care Reviewed With patient   Patient tolerating IV antibiotics well.   12/22/17 0427   Fall risk   Plan Of Care Reviewed With patient   Patient remains a high fall risk.  Bed alarm set zone 1

## 2017-12-22 NOTE — PLAN OF CARE
Problem: Patient Care Overview  Goal: Plan of Care Review  Outcome: Ongoing (interventions implemented as appropriate)   12/21/17 3001   Coping/Psychosocial   Plan Of Care Reviewed With patient   Resting quietly after surgery. Surgical dressing intact to Right leg. Denies pain. Arousable but returns to sleep. Repositioned for comfort. Iv fluids infusing as ordered

## 2017-12-22 NOTE — PLAN OF CARE
12/22/17 1107   Discharge Assessment   Assessment Type Discharge Planning Assessment   Confirmed/corrected address and phone number on facesheet? Yes   Assessment information obtained from? Caregiver   Prior to hospitilization cognitive status: Unable to Assess   Prior to hospitalization functional status: Assistive Equipment   Current cognitive status: Unable to Assess   Current Functional Status: Assistive Equipment   Facility Arrived From: [facility]    Lives With alone   Able to Return to Prior Arrangements unable to determine at this time (comments)   Is patient able to care for self after discharge? Unable to determine at this time (comments)   Patient's perception of discharge disposition admitted as an inpatient   Readmission Within The Last 30 Days no previous admission in last 30 days   Patient currently being followed by outpatient case management? No   Patient currently receives any other outside agency services? No   Equipment Currently Used at Home shower chair;walker, rolling;wheelchair   Do you have any problems affording any of your prescribed medications? No   Is the patient taking medications as prescribed? yes   Does the patient have transportation home? Yes   Transportation Available agency transportation;family or friend will provide   Discharge Plan A Skilled Nursing Facility   Discharge Plan B Home;Home Health   Patient/Family In Agreement With Plan yes     ALMA contacted pt nicole Bautista via phone to obtain and assessment. According to Michele, pt is from [facility], and has assistance from staff. According to Michele his hope is for pt to discharge to SNF at discharge. ALMA informed Michele ALMA will wait for PT/OT recommendations and contact him when ALMA has more information. Locet completed.  PASSR faxed to [facility] at:  [phone].   Awaiting 142.    2:22PM- ALMA contacted pt nicole Bautista to get choice for SNF. Michele did not answer the phone and ALMA left a message.     2:30PM- ALMA received call from pt  son Michele. According to Michele, his choices for SNF are [facility], and [facility]. ALMA informed Michele MARQUEZ will also send to [facility], [facility], and [facility]. ALMA faxed pt clinicals to [facility], [facility], [facility], and [facility] via [facility]. ALMA faxed pt clinicals to [facility] @ [phone]. ALMA awaiting to determine if services will be provided.

## 2017-12-22 NOTE — PT/OT/SLP EVAL
"Physical Therapy Evaluation    Patient Name:  Oksana Estrada   MRN:  [id number]    Recommendations:     Discharge Recommendations:  nursing facility, skilled   Discharge Equipment Recommendations: none   Barriers to discharge: Decreased caregiver support    Assessment:     Oksana Estrada is a 87 y.o. female admitted with a medical diagnosis of Closed fracture of distal end of right femur.  She presents with the following impairments/functional limitations:  weakness, impaired endurance, impaired self care skills, impaired balance, gait instability, impaired functional mobilty, impaired cognition, decreased coordination, decreased lower extremity function, decreased upper extremity function, pain, decreased safety awareness, impaired coordination, orthopedic precautions, impaired skin.  Pt may benefit from skilled PT services, but may be limited 2/2 dementia.    Rehab Prognosis:  Fair; patient may benefit from acute skilled PT services to address these deficits and reach maximum level of function.      Recent Surgery: Procedure(s) (LRB):  OPEN REDUCTION INTERNAL FIXATION-HIP (Right) 1 Day Post-Op    Plan:     During this hospitalization, patient to be seen daily to address the above listed problems via gait training, therapeutic activities, therapeutic exercises  · Plan of Care Expires:  01/19/18   Plan of Care Reviewed with: patient    Subjective     Communicated with nsg prior to session.  Patient found supine in bed upon PT entry to room "wants to get out of bed"  Chief Complaint: pain  Patient comments/goals:to get out of bed    Pain/Comfort:  · Pain Rating 1:  (unable to rate)  · Location 1:  (R LE)  · Pain Addressed 1: Reposition, Distraction, Cessation of Activity, Nurse notified    Patients cultural, spiritual, Episcopal conflicts given the current situation: none    Living Environment:  [facility]  Per chart 24hr sitters, but per pt no-one stays with her  Prior " to admission, patients level of function was requires assist.  Patient has the following equipment: shower chair, walker, rolling, wheelchair, bedside commode.  DME owned (not currently used): none.  Upon discharge, patient will have assistance from SNF staff.    Objective:     Patient found with: pressure relief boots     General Precautions: Standard, fall   Orthopedic Precautions:Full weight bearing   Braces: N/A     Exams:  · Cognitive Exam:  Patient is oriented to Person and Place and follows 50%% of simple commands   · Gross Motor Coordination:  impaired 2/2 weakness and deconditioning  · Postural Exam:  Patient presented with the following abnormalities:    · -       Rounded shoulders  · -       Forward head  · -       Kyphosis  · Sensation:    · -       Intact  light/touch B LE's  · Skin Integrity/Edema:      · -       Skin integrity: R LE with sx dressings intact  · RLE ROM: decreased knee ROM and painful  · RLE Strength: 2/5  · LLE ROM: decreased knee ROM  · LLE Strength: grossly 3/5    Functional Mobility:  · Bed Mobility:     · Rolling Left:  total assistance  · Scooting: total assistance  · Supine to Sit: total assistance  · Sit to Supine: dependence and of 2 persons  · Transfers:     · Sit to Stand:  maximal assistance and of 2 persons with rolling walker  · Balance: Poor sit and stand balance    AM-PAC 6 CLICK MOBILITY  Total Score:10       Therapeutic Activities and Exercises:   Pt required VC for bed mobility and t/f's for hand placement and safety.  Dangle protocol, pt sat at EOB with Max -Min A approx 7m.  Required Vc/Tc to correct for posterior lean.  Pt able to stand with RW and max A x 2 people approx 15 sec with VC for increased trunk ext and walker management.  Posterior lean in standing.  Pt instructed to performe B AP's while in bed.  Pt verbalzied/demonstrated understanding.      Patient left HOB elevated with all lines intact, call button in reach, bed alarm on and nsg  notified.    GOALS:    Physical Therapy Goals        Problem: Physical Therapy Goal    Goal Priority Disciplines Outcome Goal Variances Interventions   Physical Therapy Goal     PT/OT, PT Ongoing (interventions implemented as appropriate)     Description:  Goals to be met by: 2017     Patient will increase functional independence with mobility by performin. Supine to sit with Stand-by Assistance  2. Sit to stand transfer with Minimal Assistance  3. Bed to chair transfer with Minimal Assistance   4. Gait  To be assessed .   5. Sitting at edge of bed x15 minutes with Supervision  6. Lower extremity exercise program x10 reps per handout, with assistance as needed                    History:     Past Medical History:   Diagnosis Date    Dementia     Depression     Hypertension     Stroke        Past Surgical History:   Procedure Laterality Date    BREAST SURGERY      JOINT REPLACEMENT      bilateral knee replacement       Clinical Decision Making:     History  Co-morbidities and personal factors that may impact the plan of care Examination  Body Structures and Functions, activity limitations and participation restrictions that may impact the plan of care Clinical Presentation   Decision Making/ Complexity Score   Co-morbidities:   [] Time since onset of injury / illness / exacerbation  [] Status of current condition  []Patient's cognitive status and safety concerns    [] Multiple Medical Problems (see med hx)  Personal Factors:   [] Patient's age  [] Prior Level of function   [] Patient's home situation (environment and family support)  [] Patient's level of motivation  [] Expected progression of patient      HISTORY:(criteria)    [] 55146 - no personal factors/history    [] 19522 - has 1-2 personal factor/comorbidity     [] 79217 - has >3 personal factor/comorbidity     Body Regions:  [] Objective examination findings  [] Head     []  Neck  [] Trunk   [] Upper Extremity  [] Lower Extremity    Body  Systems:  [] For communication ability, affect, cognition, language, and learning style: the assessment of the ability to make needs known, consciousness, orientation (person, place, and time), expected emotional /behavioral responses, and learning preferences (eg, learning barriers, education  needs)  [] For the neuromuscular system: a general assessment of gross coordinated movement (eg, balance, gait, locomotion, transfers, and transitions) and motor function  (motor control and motor learning)  [] For the musculoskeletal system: the assessment of gross symmetry, gross range of motion, gross strength, height, and weight  [] For the integumentary system: the assessment of pliability(texture), presence of scar formation, skin color, and skin integrity  [] For cardiovascular/pulmonary system: the assessment of heart rate, respiratory rate, blood pressure, and edema     Activity limitations:    [] Patient's cognitive status and saf ety concerns          [] Status of current condition      [] Weight bearing restriction  [] Cardiopulmunary Restriction    Participation Restrictions:   [] Goals and goal agreement with the patient     [] Rehab potential (prognosis) and probable outcome      Examination of Body System: (criteria)    [] 80293 - addressing 1-2 elements    [] 56676 - addressing a total of 3 or more elements     [] 39642 -  Addressing a total of 4 or more elements         Clinical Presentation: (criteria)  Choose one     On examination of body system using standardized tests and measures patient presents with (CHOOSE ONE) elements from any of the following: body structures and functions, activity limitations, and/or participation restrictions.  Leading to a clinical presentation that is considered (CHOOSE ONE)                              Clinical Decision Making  (Eval Complexity):  Choose One     Time Tracking:     PT Received On: 12/22/17  PT Start Time: 1125     PT Stop Time: 1150  PT Total Time (min): 25  min     Billable Minutes: Evaluation 15 and Therapeutic Activity 10      Sandra العلي, PT  12/22/2017

## 2017-12-22 NOTE — ANESTHESIA POSTPROCEDURE EVALUATION
"Anesthesia Post Evaluation    Patient: Oksana Estrada    Procedure(s) Performed: Procedure(s) (LRB):  OPEN REDUCTION INTERNAL FIXATION-HIP (Right)    [facility] Anesthesia Post Op Evaluation    Visit Vitals  /60   Pulse 92   Temp 36.8 °C (98.3 °F)   Resp 19   Ht 5' 5" (1.651 m)   Wt 69.3 kg (152 lb 12.8 oz)   SpO2 (!) 94%   Breastfeeding? No   BMI 25.43 kg/m²       Pain/Ivan Score: Pain Assessment Performed: Yes (12/22/2017  6:00 AM)  Presence of Pain: non-verbal indicators absent (12/22/2017  6:00 AM)  Pain Rating Prior to Med Admin: 0 (12/22/2017  6:00 AM)  Pain Rating Post Med Admin: 0 (12/21/2017  5:17 PM)  Ivan Score: 10 (12/21/2017  4:54 PM)      "

## 2017-12-22 NOTE — NURSING
Spoke to son informed of need for restraints. Restraints still in progress. With release trial climbing out of bed disrobing. Pulling IV. Called son and notified why restraints was needed and still in progress. Dr Castillo notified that restraints still in progress

## 2017-12-22 NOTE — ANESTHESIA PREPROCEDURE EVALUATION
12/22/2017  Oksana Estrada is a 87 y.o., female.    Anesthesia Evaluation    I have reviewed the Patient Summary Reports.     I have reviewed the Medications.     Review of Systems  Anesthesia Hx:  No problems with previous Anesthesia   Denies Personal Hx of Anesthesia complications.   Hematology/Oncology:  Hematology Normal   Oncology Normal     EENT/Dental:EENT/Dental Normal   Cardiovascular:   Hypertension    Pulmonary:  Pulmonary Normal    Renal/:  Renal/ Normal     Hepatic/GI:  Hepatic/GI Normal    Musculoskeletal:  Musculoskeletal Normal    Neurological:   CVA    Endocrine:  Endocrine Normal    Dermatological:  Skin Normal    Psych:   Psychiatric History          Physical Exam  General:  Well nourished    Airway/Jaw/Neck:  Airway Findings: Mouth Opening: Normal Tongue: Normal  Mallampati: II      Dental:  Dental Findings: In tact   Chest/Lungs:  Chest/Lungs Clear    Heart/Vascular:  Heart Findings: Normal Heart murmur: negative       Mental Status:  Mental Status Findings:  Cooperative, Alert and Oriented         Anesthesia Plan  Type of Anesthesia, risks & benefits discussed:  Anesthesia Type:  general, MAC, regional  Patient's Preference:   Intra-op Monitoring Plan: standard ASA monitors  Intra-op Monitoring Plan Comments:   Post Op Pain Control Plan: multimodal analgesia  Post Op Pain Control Plan Comments:   Induction:   IV  Beta Blocker:  Patient is not currently on a Beta-Blocker (No further documentation required).       Informed Consent: Patient understands risks and agrees with Anesthesia plan.  Questions answered. Anesthesia consent signed with patient.  ASA Score: 3     Day of Surgery Review of History & Physical:            Ready For Surgery From Anesthesia Perspective.

## 2017-12-22 NOTE — NURSING
While making rounds on patient, I noticed that she had pulled out her IV, catheter and was unclothed.  While trying to redirect patient, she became combative and staff was unable to verbally de escalate.  Call placed to Dr. Granger and order obtained to put patient in soft restraints for her safety.  Soft restraints applied to left and right wrists.  Patient tolerated well.  Patient refused her medications and refused food and drink at this time.  Will monitor and re approach.

## 2017-12-22 NOTE — NURSING
Patient asleep, regained IV access to right wrist 20 G.  Patient tolerated well.  Removed soft restraints because the behavior that warranted restraints has resolved.  Will continue to monitor

## 2017-12-22 NOTE — PROGRESS NOTES
[facility] Hospital Medicine  Progress Note    Patient Name: Oksana Estrada  MRN: [id number]  Patient Class: IP- Inpatient   Admission Date: 12/20/2017  Length of Stay: 2 days  Attending Physician: Freddie Mccarthy MD  Primary Care Provider: Red Griffith MD        Subjective:     Principal Problem:Closed fracture of distal end of right femur    HPI:  Mrs. Oksana Estrada is a 87 y.o. female with essential hypertension, hyperlipidemia (.0 May 2018), anemia of chronic disease, dementia, history of CVA, and history of subdural hematoma who presents to [facility] ED with complaints of fall today.  She had some right leg pain after a fall today while she was trying to get out of bed to use the restroom.  EMS was activated and she was given fentanyl for pain and transported to this facility.  Further history is limited at this time.    Hospital Course:  Mrs. Oksana Estrada is a 87 y.o. female with essential hypertension, hyperlipidemia (.0 May 2018), anemia of chronic disease, dementia, history of CVA, and history of subdural hematoma who presents to [facility] ED with complaints of fall .  She had some right leg pain after a fall  while she was trying to get out of bed to use the restroom.  EMS was activated and she was given fentanyl for pain and transported to this facility.X ray showed Acute comminuted fracture distal right femur just above the knee arthroplasty,S/P ORIF right hip on 12.21.17,pn pain control,PT,OT.  Was confused earlier was put in soft restrain,alert and  stable at this time.    Past Medical History:   Diagnosis Date    Dementia     Depression     Hypertension     Stroke        Past Surgical History:   Procedure Laterality Date    BREAST SURGERY      JOINT REPLACEMENT      bilateral knee replacement       Review of patient's allergies indicates:   Allergen Reactions    Codeine     Penicillins        No current facility-administered  medications on file prior to encounter.      Current Outpatient Prescriptions on File Prior to Encounter   Medication Sig    alendronate (FOSAMAX) 70 MG tablet Take 1 tablet (70 mg total) by mouth every 7 days.    atorvastatin (LIPITOR) 20 MG tablet Take 1 tablet (20 mg total) by mouth once daily.    chlordiazepoxide-clidinium 5-2.5 mg (LIBRAX) 5-2.5 mg Cap Take 1 capsule by mouth 3 (three) times daily as needed. For irritable bowel syndrome    chlorthalidone (HYGROTEN) 25 MG Tab Take 25 mg by mouth daily as needed.    cholestyramine-aspartame (QUESTRAN LIGHT) 4 gram PwPk Take 1 packet (4 g total) by mouth 2 (two) times daily. As needed for diarrhea    donepezil (ARICEPT) 5 MG tablet Take 1 tablet (5 mg total) by mouth every evening. For memory    escitalopram oxalate (LEXAPRO) 20 MG tablet Take 1 tablet (20 mg total) by mouth once daily.    lisinopril (PRINIVIL,ZESTRIL) 20 MG tablet Take 1 tablet (20 mg total) by mouth once daily.    quetiapine (SEROQUEL) 25 MG Tab Take 1 tablet (25 mg total) by mouth every evening.    terbinafine HCl (LAMISIL) 250 mg tablet Take 250 mg by mouth once daily.    tolterodine (DETROL) 1 MG Tab Take 1 tablet (1 mg total) by mouth 2 (two) times daily.     Family History     None        Social History Main Topics    Smoking status: Former Smoker     Types: Cigarettes    Smokeless tobacco: Never Used      Comment: quit 25 years ago    Alcohol use No    Drug use: No    Sexual activity: No     Review of Systems   Unable to perform ROS: Dementia     Objective:     Vital Signs (Most Recent):  Temp: 98.3 °F (36.8 °C) (12/22/17 0759)  Pulse: 92 (12/22/17 0759)  Resp: 19 (12/22/17 0759)  BP: 127/60 (12/22/17 0759)  SpO2: (!) 94 % (12/22/17 0805) Vital Signs (24h Range):  Temp:  [97 °F (36.1 °C)-100.2 °F (37.9 °C)] 98.3 °F (36.8 °C)  Pulse:  [] 92  Resp:  [12-20] 19  SpO2:  [92 %-98 %] 94 %  BP: (119-152)/(58-85) 127/60     Weight: 69.3 kg (152 lb 12.8 oz)  Body mass index is  "25.43 kg/m².    Physical Exam   Constitutional: She appears well-developed and well-nourished. No distress.   HENT:   Head: Normocephalic and atraumatic.   Right Ear: External ear normal.   Left Ear: External ear normal.   Nose: Nose normal.   Eyes: Right eye exhibits no discharge. Left eye exhibits no discharge.   Neck: Normal range of motion.   Cardiovascular: Normal rate, regular rhythm, normal heart sounds and intact distal pulses.  Exam reveals no gallop and no friction rub.    No murmur heard.  Pulmonary/Chest: Effort normal and breath sounds normal. No respiratory distress. She has no wheezes. She has no rales. She exhibits no tenderness.   Abdominal: Soft. Bowel sounds are normal. She exhibits no distension. There is no tenderness. There is no rebound and no guarding.   Musculoskeletal: Normal range of motion. She exhibits no edema.   Neurological:   Alert bur oriented only to self   Skin: Skin is warm and dry. She is not diaphoretic. No erythema.   Nursing note and vitals reviewed.          Significant Labs: All pertinent labs within the past 24 hours have been reviewed.    Significant Imaging: I have reviewed and interpreted all pertinent imaging results/findings within the past 24 hours.    Assessment/Plan:      * Closed fracture of distal end of right femur    Patient had a fall and has sustained a fracture.  Plain radiograph was significant for "[a]cute comminuted fracture distal right femur just above the knee arthroplasty."  Her Revised Cardiac Risk Index suggests that she is at low risk for perioperative cardiac events.Echo on 10/2017 show preserved EF, Orthopedic Surgery for evaluation and further recommendations.S/P ORIF right femur on 12.21.17,pain control,PT,OT         Anemia of chronic disease    The patient's H/H is stable and consistent with previous laboratory measurements, and the patient exhibits no signs or symptoms of acute bleeding; there is no indication for transfusion.  Will continue " to monitor.        Hyperlipidemia    Poorly-controlled; will continue patient's home regimen of atorvastatin.        Essential hypertension    Patient's blood pressure is poorly-controlled; will continue home regimen of chlorthalidone and lisinopril, and provide as-needed clonidine.        History of CVA (cerebrovascular accident)    Stable; will continue her home regimen of atorvastatin.        Dementia without behavioral disturbance    Stable; will continue her home regimen of donepezil.        History of subdural hematoma    Stable; there are no acute issues.          VTE Risk Mitigation         Ordered     enoxaparin injection 40 mg  Daily     Route:  Subcutaneous        12/22/17 0852     Medium Risk of VTE  Once      12/20/17 2203     Place sequential compression device  Until discontinued      12/20/17 1758     Place VENUS hose  Until discontinued      12/20/17 1758              Freddie Mccarthy MD  Department of Hospital Medicine   [facility]

## 2017-12-23 PROBLEM — R33.9 URINARY RETENTION: Status: ACTIVE | Noted: 2017-12-23

## 2017-12-23 LAB
ANION GAP SERPL CALC-SCNC: 11 MMOL/L
BASOPHILS # BLD AUTO: 0.02 K/UL
BASOPHILS NFR BLD: 0.2 %
BUN SERPL-MCNC: 18 MG/DL
CALCIUM SERPL-MCNC: 8.3 MG/DL
CHLORIDE SERPL-SCNC: 98 MMOL/L
CO2 SERPL-SCNC: 23 MMOL/L
CREAT SERPL-MCNC: 1 MG/DL
DIFFERENTIAL METHOD: ABNORMAL
EOSINOPHIL # BLD AUTO: 0 K/UL
EOSINOPHIL NFR BLD: 0 %
ERYTHROCYTE [DISTWIDTH] IN BLOOD BY AUTOMATED COUNT: 15.2 %
EST. GFR  (AFRICAN AMERICAN): 59 ML/MIN/1.73 M^2
EST. GFR  (NON AFRICAN AMERICAN): 51 ML/MIN/1.73 M^2
GLUCOSE SERPL-MCNC: 102 MG/DL
HCT VFR BLD AUTO: 23.8 %
HGB BLD-MCNC: 8.5 G/DL
LYMPHOCYTES # BLD AUTO: 1 K/UL
LYMPHOCYTES NFR BLD: 8.5 %
MCH RBC QN AUTO: 30.7 PG
MCHC RBC AUTO-ENTMCNC: 35.7 G/DL
MCV RBC AUTO: 86 FL
MONOCYTES # BLD AUTO: 1.4 K/UL
MONOCYTES NFR BLD: 11 %
NEUTROPHILS # BLD AUTO: 9.8 K/UL
NEUTROPHILS NFR BLD: 80.3 %
PLATELET # BLD AUTO: 185 K/UL
PMV BLD AUTO: 11.2 FL
POTASSIUM SERPL-SCNC: 3.2 MMOL/L
RBC # BLD AUTO: 2.77 M/UL
SODIUM SERPL-SCNC: 132 MMOL/L
WBC # BLD AUTO: 12.24 K/UL

## 2017-12-23 PROCEDURE — 25000003 PHARM REV CODE 250: Performed by: EMERGENCY MEDICINE

## 2017-12-23 PROCEDURE — 25000003 PHARM REV CODE 250: Performed by: INTERNAL MEDICINE

## 2017-12-23 PROCEDURE — 85025 COMPLETE CBC W/AUTO DIFF WBC: CPT

## 2017-12-23 PROCEDURE — 97530 THERAPEUTIC ACTIVITIES: CPT

## 2017-12-23 PROCEDURE — 51798 US URINE CAPACITY MEASURE: CPT

## 2017-12-23 PROCEDURE — 63600175 PHARM REV CODE 636 W HCPCS: Performed by: INTERNAL MEDICINE

## 2017-12-23 PROCEDURE — 51702 INSERT TEMP BLADDER CATH: CPT

## 2017-12-23 PROCEDURE — 36415 COLL VENOUS BLD VENIPUNCTURE: CPT

## 2017-12-23 PROCEDURE — 99900035 HC TECH TIME PER 15 MIN (STAT)

## 2017-12-23 PROCEDURE — 63600175 PHARM REV CODE 636 W HCPCS: Performed by: HOSPITALIST

## 2017-12-23 PROCEDURE — 25000003 PHARM REV CODE 250: Performed by: ORTHOPAEDIC SURGERY

## 2017-12-23 PROCEDURE — 94761 N-INVAS EAR/PLS OXIMETRY MLT: CPT

## 2017-12-23 PROCEDURE — 12000002 HC ACUTE/MED SURGE SEMI-PRIVATE ROOM

## 2017-12-23 PROCEDURE — 80048 BASIC METABOLIC PNL TOTAL CA: CPT

## 2017-12-23 RX ORDER — SODIUM CHLORIDE AND POTASSIUM CHLORIDE 150; 900 MG/100ML; MG/100ML
INJECTION, SOLUTION INTRAVENOUS CONTINUOUS
Status: DISPENSED | OUTPATIENT
Start: 2017-12-23 | End: 2017-12-24

## 2017-12-23 RX ORDER — POTASSIUM CHLORIDE 20 MEQ/1
40 TABLET, EXTENDED RELEASE ORAL ONCE
Status: COMPLETED | OUTPATIENT
Start: 2017-12-23 | End: 2017-12-23

## 2017-12-23 RX ORDER — MORPHINE SULFATE 10 MG/ML
2 INJECTION, SOLUTION INTRAMUSCULAR; INTRAVENOUS EVERY 4 HOURS PRN
Status: DISCONTINUED | OUTPATIENT
Start: 2017-12-23 | End: 2017-12-24

## 2017-12-23 RX ORDER — HYDROCODONE BITARTRATE AND ACETAMINOPHEN 500; 5 MG/1; MG/1
TABLET ORAL
Status: DISCONTINUED | OUTPATIENT
Start: 2017-12-23 | End: 2017-12-28 | Stop reason: HOSPADM

## 2017-12-23 RX ADMIN — DOCUSATE SODIUM AND SENNOSIDES 1 TABLET: 8.6; 5 TABLET, FILM COATED ORAL at 08:12

## 2017-12-23 RX ADMIN — FAMOTIDINE 20 MG: 20 TABLET, FILM COATED ORAL at 08:12

## 2017-12-23 RX ADMIN — POTASSIUM CHLORIDE AND SODIUM CHLORIDE: 900; 150 INJECTION, SOLUTION INTRAVENOUS at 06:12

## 2017-12-23 RX ADMIN — ATORVASTATIN CALCIUM 20 MG: 10 TABLET, FILM COATED ORAL at 08:12

## 2017-12-23 RX ADMIN — LISINOPRIL 20 MG: 20 TABLET ORAL at 08:12

## 2017-12-23 RX ADMIN — ENOXAPARIN SODIUM 40 MG: 100 INJECTION SUBCUTANEOUS at 04:12

## 2017-12-23 RX ADMIN — POTASSIUM CHLORIDE 40 MEQ: 1500 TABLET, EXTENDED RELEASE ORAL at 06:12

## 2017-12-23 RX ADMIN — MUPIROCIN 1 G: 20 OINTMENT TOPICAL at 08:12

## 2017-12-23 RX ADMIN — QUETIAPINE FUMARATE 25 MG: 25 TABLET, FILM COATED ORAL at 08:12

## 2017-12-23 RX ADMIN — DONEPEZIL HYDROCHLORIDE 5 MG: 5 TABLET, FILM COATED ORAL at 08:12

## 2017-12-23 RX ADMIN — CHLORTHALIDONE 25 MG: 25 TABLET ORAL at 08:12

## 2017-12-23 NOTE — PLAN OF CARE
Problem: Physical Therapy Goal  Goal: Physical Therapy Goal  Goals to be met by: 2017     Patient will increase functional independence with mobility by performin. Supine to sit with Stand-by Assistance  2. Sit to stand transfer with Minimal Assistance  3. Bed to chair transfer with Minimal Assistance   4. Gait  To be assessed .   5. Sitting at edge of bed x15 minutes with Supervision  6. Lower extremity exercise program x10 reps per handout, with assistance as needed   Outcome: Ongoing (interventions implemented as appropriate)  Pt able to maintain sitting balance on EOB with CGA/SBA.  Continues to require Dep A x2 for BM and Max A x2 for transfers.

## 2017-12-23 NOTE — PLAN OF CARE
Problem: Patient Care Overview  Goal: Plan of Care Review  Outcome: Ongoing (interventions implemented as appropriate)  Pt turn Q2, pt L knee drsg w/aquacel, not tolerating meals this shift, lara initiated to gravity, heels elevated, pt restraints released and pt not pulling at medical devices, safety maintained, bed low locked and in position, will cont plan of care

## 2017-12-23 NOTE — PLAN OF CARE
Problem: Patient Care Overview  Goal: Plan of Care Review  Outcome: Ongoing (interventions implemented as appropriate)  No falls or injuries noted. Pulling at iv, disrobing.Stating she wants to leave. Restraints needed. No sign of infection noted.No pressure areas noted. Turned every 2 hours. Surgery dressing intact.

## 2017-12-23 NOTE — PROGRESS NOTES
Care assumed. Report received gary Masters RN. Pt stable. No apparent  distress noted. Will continue to monitor.

## 2017-12-23 NOTE — PROGRESS NOTES
Pt tolerated placement of 16F lara catheter under sterile technique per charge Beau, urimeter collection of yellow urine, will monitor

## 2017-12-23 NOTE — PROGRESS NOTES
aquacel drsg applied to R knee per MD order, paged James-Janice x2 to inform of bladder scan results, awaiting return call

## 2017-12-23 NOTE — PROGRESS NOTES
Pt w/order for 1 unit PRBC, PA notified that pt does not have blood consent and PA stated to hold order, will monitor

## 2017-12-23 NOTE — PT/OT/SLP PROGRESS
"Physical Therapy Treatment    Patient Name:  Oksana Estrada   MRN:  [id number]    Recommendations:     Discharge Recommendations:  nursing facility, skilled   Discharge Equipment Recommendations: none   Barriers to discharge: Decreased caregiver support    Assessment:     Oksana Estrada is a 87 y.o. female admitted with a medical diagnosis of Closed fracture of distal end of right femur.  She presents with the following impairments/functional limitations:  weakness, impaired endurance, impaired self care skills, impaired functional mobilty, decreased safety awareness, impaired cognition, impaired coordination, pain, decreased coordination, gait instability, impaired balance, decreased upper extremity function, impaired skin, decreased ROM, decreased lower extremity function, edema, orthopedic precautions.  Pt able to maintain sitting EOB with CGA/SBA.  Pt performed x2 standing trials with fwd trunk flexion with RW, Max A x2.  Pt is making slow progress towards goals.    Rehab Prognosis:  fair; patient would benefit from acute skilled PT services to address these deficits and reach maximum level of function.      Recent Surgery: Procedure(s) (LRB):  OPEN REDUCTION INTERNAL FIXATION-HIP (Right) 2 Days Post-Op    Plan:     During this hospitalization, patient to be seen daily to address the above listed problems via gait training, therapeutic activities, therapeutic exercises  · Plan of Care Expires:  01/19/18   Plan of Care Reviewed with: patient    Subjective     Communicated with pt's nurse, Marcia, prior to session.  Patient found supine upon PT entry to room, agreeable to treatment.      Chief Complaint: pt with minimal verbalization  Patient comments/goals: pt displays pain with movement  Pain/Comfort:  · Pain Rating 1:  (pt stated " it hurts a little")  · Location 1:  (RLE)  · Pain Addressed 1: Nurse notified, Cessation of Activity, Distraction, Reposition    Patients cultural, spiritual, " Restoration conflicts given the current situation: none    Objective:     Patient found with: pressure relief boots, SCD, peripheral IV     General Precautions: Standard, fall   Orthopedic Precautions:Full weight bearing   Braces:       Functional Mobility:  · Bed Mobility:     · Scooting: dependence  · Supine to Sit: dependence and of 2 persons  · Sit to Supine: dependence and of 2 persons  · Transfers:     · Sit to Stand:  maximal assistance and of 2 persons with rolling walker  · Balance: pt with fair sitting balance sitting EOB and poor static standing      AM-PAC 6 CLICK MOBILITY  Turning over in bed (including adjusting bedclothes, sheets and blankets)?: 2  Sitting down on and standing up from a chair with arms (e.g., wheelchair, bedside commode, etc.): 2  Moving from lying on back to sitting on the side of the bed?: 2  Moving to and from a bed to a chair (including a wheelchair)?: 2  Need to walk in hospital room?: 1  Climbing 3-5 steps with a railing?: 1  Total Score: 10       Therapeutic Activities and Exercises:   PROM performed to RLE in supine in all available planes.    Patient left with SCDs and pressure relief boots with all lines intact, call button in reach, bed alarm on and pt's nurse, Marcia, notified..    GOALS:    Physical Therapy Goals        Problem: Physical Therapy Goal    Goal Priority Disciplines Outcome Goal Variances Interventions   Physical Therapy Goal     PT/OT, PT Ongoing (interventions implemented as appropriate)     Description:  Goals to be met by: 2017     Patient will increase functional independence with mobility by performin. Supine to sit with Stand-by Assistance  2. Sit to stand transfer with Minimal Assistance  3. Bed to chair transfer with Minimal Assistance   4. Gait  To be assessed .   5. Sitting at edge of bed x15 minutes with Supervision  6. Lower extremity exercise program x10 reps per handout, with assistance as needed                    Time Tracking:      PT Received On: 12/23/17  PT Start Time: 0825     PT Stop Time: 0848  PT Total Time (min): 23 min     Billable Minutes: Therapeutic Activity 13    Treatment Type: Treatment  PT/PTA: PTA     PTA Visit Number: 1     Summer Harding PTA  12/23/2017

## 2017-12-23 NOTE — PROGRESS NOTES
POD#2  Patient appeared to have too much pain medicine. She was unable to wake up and participate in exam.  VSSAF    Right LE: Dressing removed. Drain has already been pulled.   Hct: 23.8    A/P: POD#2 ORIF right distal femur fracture  1) aquacel ordered  2) up with PT- NWB right LE  3) 1U PRBCs ordered  4) will review pain meds

## 2017-12-24 PROBLEM — D62 ACUTE BLOOD LOSS ANEMIA: Status: ACTIVE | Noted: 2017-12-24

## 2017-12-24 LAB
ABO + RH BLD: NORMAL
ANION GAP SERPL CALC-SCNC: 10 MMOL/L
BASOPHILS # BLD AUTO: 0.01 K/UL
BASOPHILS NFR BLD: 0.1 %
BLD GP AB SCN CELLS X3 SERPL QL: NORMAL
BLD PROD TYP BPU: NORMAL
BLOOD UNIT EXPIRATION DATE: NORMAL
BLOOD UNIT TYPE CODE: 6200
BLOOD UNIT TYPE: NORMAL
BUN SERPL-MCNC: 23 MG/DL
CALCIUM SERPL-MCNC: 8.2 MG/DL
CHLORIDE SERPL-SCNC: 101 MMOL/L
CO2 SERPL-SCNC: 24 MMOL/L
CODING SYSTEM: NORMAL
CREAT SERPL-MCNC: 0.9 MG/DL
DIFFERENTIAL METHOD: ABNORMAL
DISPENSE STATUS: NORMAL
EOSINOPHIL # BLD AUTO: 0 K/UL
EOSINOPHIL NFR BLD: 0.1 %
ERYTHROCYTE [DISTWIDTH] IN BLOOD BY AUTOMATED COUNT: 15.3 %
EST. GFR  (AFRICAN AMERICAN): >60 ML/MIN/1.73 M^2
EST. GFR  (NON AFRICAN AMERICAN): 58 ML/MIN/1.73 M^2
GLUCOSE SERPL-MCNC: 84 MG/DL
HCT VFR BLD AUTO: 22.4 %
HGB BLD-MCNC: 7.7 G/DL
LYMPHOCYTES # BLD AUTO: 0.6 K/UL
LYMPHOCYTES NFR BLD: 8 %
MCH RBC QN AUTO: 30.7 PG
MCHC RBC AUTO-ENTMCNC: 34.4 G/DL
MCV RBC AUTO: 89 FL
MONOCYTES # BLD AUTO: 0.8 K/UL
MONOCYTES NFR BLD: 9.7 %
NEUTROPHILS # BLD AUTO: 6.5 K/UL
NEUTROPHILS NFR BLD: 82.1 %
PLATELET # BLD AUTO: 208 K/UL
PMV BLD AUTO: 10.8 FL
POTASSIUM SERPL-SCNC: 3.1 MMOL/L
RBC # BLD AUTO: 2.51 M/UL
SODIUM SERPL-SCNC: 135 MMOL/L
TRANS ERYTHROCYTES VOL PATIENT: NORMAL ML
WBC # BLD AUTO: 7.87 K/UL

## 2017-12-24 PROCEDURE — P9021 RED BLOOD CELLS UNIT: HCPCS

## 2017-12-24 PROCEDURE — 99900035 HC TECH TIME PER 15 MIN (STAT)

## 2017-12-24 PROCEDURE — 25000003 PHARM REV CODE 250: Performed by: ORTHOPAEDIC SURGERY

## 2017-12-24 PROCEDURE — 87088 URINE BACTERIA CULTURE: CPT

## 2017-12-24 PROCEDURE — 36430 TRANSFUSION BLD/BLD COMPNT: CPT

## 2017-12-24 PROCEDURE — 87086 URINE CULTURE/COLONY COUNT: CPT

## 2017-12-24 PROCEDURE — 63600175 PHARM REV CODE 636 W HCPCS: Performed by: HOSPITALIST

## 2017-12-24 PROCEDURE — 99223 1ST HOSP IP/OBS HIGH 75: CPT | Mod: ,,, | Performed by: UROLOGY

## 2017-12-24 PROCEDURE — 87077 CULTURE AEROBIC IDENTIFY: CPT

## 2017-12-24 PROCEDURE — 87186 SC STD MICRODIL/AGAR DIL: CPT

## 2017-12-24 PROCEDURE — 25000003 PHARM REV CODE 250: Performed by: EMERGENCY MEDICINE

## 2017-12-24 PROCEDURE — 86850 RBC ANTIBODY SCREEN: CPT

## 2017-12-24 PROCEDURE — 25000003 PHARM REV CODE 250: Performed by: INTERNAL MEDICINE

## 2017-12-24 PROCEDURE — 97530 THERAPEUTIC ACTIVITIES: CPT

## 2017-12-24 PROCEDURE — 27000221 HC OXYGEN, UP TO 24 HOURS

## 2017-12-24 PROCEDURE — 12000002 HC ACUTE/MED SURGE SEMI-PRIVATE ROOM

## 2017-12-24 PROCEDURE — 85025 COMPLETE CBC W/AUTO DIFF WBC: CPT

## 2017-12-24 PROCEDURE — 97110 THERAPEUTIC EXERCISES: CPT

## 2017-12-24 PROCEDURE — 36415 COLL VENOUS BLD VENIPUNCTURE: CPT

## 2017-12-24 PROCEDURE — 86920 COMPATIBILITY TEST SPIN: CPT

## 2017-12-24 PROCEDURE — 80048 BASIC METABOLIC PNL TOTAL CA: CPT

## 2017-12-24 RX ORDER — HYDROCODONE BITARTRATE AND ACETAMINOPHEN 500; 5 MG/1; MG/1
TABLET ORAL
Status: DISCONTINUED | OUTPATIENT
Start: 2017-12-24 | End: 2017-12-28 | Stop reason: HOSPADM

## 2017-12-24 RX ORDER — OXYCODONE AND ACETAMINOPHEN 5; 325 MG/1; MG/1
1 TABLET ORAL EVERY 4 HOURS PRN
Status: DISCONTINUED | OUTPATIENT
Start: 2017-12-24 | End: 2017-12-28 | Stop reason: HOSPADM

## 2017-12-24 RX ORDER — POTASSIUM CHLORIDE 20 MEQ/15ML
40 SOLUTION ORAL ONCE
Status: COMPLETED | OUTPATIENT
Start: 2017-12-24 | End: 2017-12-24

## 2017-12-24 RX ADMIN — DOCUSATE SODIUM AND SENNOSIDES 1 TABLET: 8.6; 5 TABLET, FILM COATED ORAL at 09:12

## 2017-12-24 RX ADMIN — LISINOPRIL 20 MG: 20 TABLET ORAL at 08:12

## 2017-12-24 RX ADMIN — CHLORTHALIDONE 25 MG: 25 TABLET ORAL at 08:12

## 2017-12-24 RX ADMIN — QUETIAPINE FUMARATE 25 MG: 25 TABLET, FILM COATED ORAL at 09:12

## 2017-12-24 RX ADMIN — FAMOTIDINE 20 MG: 20 TABLET, FILM COATED ORAL at 09:12

## 2017-12-24 RX ADMIN — ENOXAPARIN SODIUM 40 MG: 100 INJECTION SUBCUTANEOUS at 05:12

## 2017-12-24 RX ADMIN — POTASSIUM CHLORIDE 40 MEQ: 20 SOLUTION ORAL at 07:12

## 2017-12-24 RX ADMIN — MUPIROCIN 1 G: 20 OINTMENT TOPICAL at 09:12

## 2017-12-24 RX ADMIN — DOCUSATE SODIUM AND SENNOSIDES 1 TABLET: 8.6; 5 TABLET, FILM COATED ORAL at 08:12

## 2017-12-24 RX ADMIN — DONEPEZIL HYDROCHLORIDE 5 MG: 5 TABLET, FILM COATED ORAL at 09:12

## 2017-12-24 RX ADMIN — MUPIROCIN 1 G: 20 OINTMENT TOPICAL at 08:12

## 2017-12-24 RX ADMIN — FAMOTIDINE 20 MG: 20 TABLET, FILM COATED ORAL at 08:12

## 2017-12-24 RX ADMIN — ATORVASTATIN CALCIUM 20 MG: 10 TABLET, FILM COATED ORAL at 08:12

## 2017-12-24 NOTE — PLAN OF CARE
Problem: Patient Care Overview  Goal: Plan of Care Review  Outcome: Ongoing (interventions implemented as appropriate)  Pt turn Q2, pt L knee aquacel CDI, pt w/order 2 units PRBCs, not tolerating meals this shift, lara initiated to gravity, heels elevated, pt restraints released and pt not pulling at medical devices, safety maintained, bed low locked and in position, will cont plan of care

## 2017-12-24 NOTE — SUBJECTIVE & OBJECTIVE
Past Medical History:   Diagnosis Date    Dementia     Depression     Hypertension     Stroke        Past Surgical History:   Procedure Laterality Date    BREAST SURGERY      JOINT REPLACEMENT      bilateral knee replacement       Review of patient's allergies indicates:   Allergen Reactions    Codeine     Penicillins        Family History     None          Social History Main Topics    Smoking status: Former Smoker     Types: Cigarettes    Smokeless tobacco: Never Used      Comment: quit 25 years ago    Alcohol use No    Drug use: No    Sexual activity: No       Review of Systems   Constitutional: Negative.  Negative for fever.   HENT: Negative.    Eyes: Negative.    Respiratory: Negative for cough, chest tightness and shortness of breath.    Cardiovascular: Negative for chest pain.   Gastrointestinal: Negative.  Negative for constipation, diarrhea and nausea.   Genitourinary: Positive for difficulty urinating.   Musculoskeletal: Negative.    Neurological: Negative.    Psychiatric/Behavioral: Negative.        Objective:     Temp:  [97.2 °F (36.2 °C)-99 °F (37.2 °C)] 99 °F (37.2 °C)  Pulse:  [74-87] 87  Resp:  [18-20] 18  SpO2:  [94 %-99 %] 94 %  BP: (116-135)/(53-65) 133/63     Body mass index is 25.43 kg/m².            Drains     Drain                 Urethral Catheter 12/23/17 1204 less than 1 day                Physical Exam   Nursing note and vitals reviewed.  Constitutional: She is oriented to person, place, and time. She appears well-developed.   HENT:   Head: Normocephalic.   Eyes: Conjunctivae are normal.   Neck: Normal range of motion. No tracheal deviation present. No thyromegaly present.   Cardiovascular: Normal rate, normal heart sounds and normal pulses.    Pulmonary/Chest: Effort normal and breath sounds normal. No respiratory distress. She has no wheezes.   Abdominal: Soft. She exhibits no distension and no mass. There is no hepatosplenomegaly. There is no tenderness. There is no  rebound, no guarding and no CVA tenderness. No hernia.   Genitourinary:   Genitourinary Comments: Tidwell in place with yellow urine   Musculoskeletal: Normal range of motion. She exhibits no edema or tenderness.   Lymphadenopathy:     She has no cervical adenopathy.   Neurological: She is alert and oriented to person, place, and time.   Skin: Skin is warm and dry. No rash noted. No erythema.     Psychiatric: She has a normal mood and affect. Her behavior is normal. Judgment and thought content normal.       Significant Labs:    BMP:    Recent Labs  Lab 12/21/17  0525 12/23/17  0452 12/24/17 0418   * 132* 135*   K 3.6 3.2* 3.1*   CL 99 98 101   CO2 23 23 24   BUN 12 18 23   CREATININE 0.8 1.0 0.9   CALCIUM 8.8 8.3* 8.2*       CBC:    Recent Labs  Lab 12/22/17  0448 12/23/17  0452 12/24/17  0418   WBC 10.54 12.24 7.87   HGB 9.4* 8.5* 7.7*   HCT 27.5* 23.8* 22.4*    185 208       Blood Culture: No results for input(s): LABBLOO in the last 168 hours.  Urine Culture:   Recent Labs  Lab 12/20/17  1515   LABURIN No growth       Significant Imaging:

## 2017-12-24 NOTE — PLAN OF CARE
Problem: Physical Therapy Goal  Goal: Physical Therapy Goal  Goals to be met by: 2017     Patient will increase functional independence with mobility by performin. Supine to sit with Stand-by Assistance  2. Sit to stand transfer with Minimal Assistance  3. Bed to chair transfer with Minimal Assistance   4. Gait  To be assessed .   5. Sitting at edge of bed x15 minutes with Supervision  6. Lower extremity exercise program x10 reps per handout, with assistance as needed   Outcome: Ongoing (interventions implemented as appropriate)  Pt required max A for rolling and has difficulty following commands.  Performed supine exercises with AAROM and constant cues for redirection for completion.

## 2017-12-24 NOTE — PROGRESS NOTES
[facility] Hospital Medicine  Progress Note    Patient Name: Oksana Estrada  MRN: [id number]  Patient Class: IP- Inpatient   Admission Date: 12/20/2017  Length of Stay: 3 days  Attending Physician: Carmela Ye MD  Primary Care Provider: Red Griffith MD        Subjective:     Principal Problem:Closed fracture of distal end of right femur    HPI:  Mrs. Oksana Estrada is a 87 y.o. female with essential hypertension, hyperlipidemia (.0 May 2018), anemia of chronic disease, dementia, history of CVA, and history of subdural hematoma who presents to [facility] ED with complaints of fall today.  She had some right leg pain after a fall today while she was trying to get out of bed to use the restroom.  EMS was activated and she was given fentanyl for pain and transported to this facility.  Further history is limited at this time.    Hospital Course:  X ray showed acute comminuted fracture distal right femur just above the knee arthroplasty. She was taken for ORIF right hip on 12.21.17. PT/OT consulted. She has dementia and briefly required restraints. SNF placement pending.    Interval History: able to remove restraints    Review of Systems   Unable to perform ROS: Dementia     Objective:     Vital Signs (Most Recent):  Temp: 97.2 °F (36.2 °C) (12/23/17 1730)  Pulse: 82 (12/23/17 1730)  Resp: 18 (12/23/17 1730)  BP: 135/65 (12/23/17 1730)  SpO2: 95 % (12/23/17 1730) Vital Signs (24h Range):  Temp:  [97.2 °F (36.2 °C)-99 °F (37.2 °C)] 97.2 °F (36.2 °C)  Pulse:  [75-91] 82  Resp:  [16-18] 18  SpO2:  [94 %-98 %] 95 %  BP: (123-135)/(58-85) 135/65     Weight: 69.3 kg (152 lb 12.8 oz)  Body mass index is 25.43 kg/m².    Intake/Output Summary (Last 24 hours) at 12/23/17 1836  Last data filed at 12/23/17 1300   Gross per 24 hour   Intake                0 ml   Output              350 ml   Net             -350 ml      Physical Exam   Constitutional: She appears well-developed and  "well-nourished. No distress.   HENT:   Right Ear: External ear normal.   Left Ear: External ear normal.   Nose: Nose normal.   Mouth/Throat: Oropharynx is clear and moist.   Eyes: EOM are normal. Pupils are equal, round, and reactive to light. No scleral icterus.   Neck: Normal range of motion. Neck supple. No thyromegaly present.   Cardiovascular: Normal rate and normal heart sounds.  Exam reveals no friction rub.    No murmur heard.  Pulmonary/Chest: Effort normal and breath sounds normal. No respiratory distress. She has no wheezes. She has no rales.   Abdominal: Soft. Bowel sounds are normal. She exhibits no mass. There is no tenderness.   No hepatosplenomegaly   Musculoskeletal: She exhibits no edema or deformity.   Neurological: She is alert. No cranial nerve deficit.   Confused   Skin: Skin is warm and dry. No pallor.   Surgical site bandaged, c/d/i       Significant Labs: All pertinent labs within the past 24 hours have been reviewed.      Assessment/Plan:      * Closed fracture of distal end of right femur    Patient had a fall and has sustained a fracture.  Plain radiograph was significant for "[a]cute comminuted fracture distal right femur just above the knee arthroplasty."  Her Revised Cardiac Risk Index suggests that she is at low risk for perioperative cardiac events.Echo on 10/2017 show preserved EF, Orthopedic Surgery for evaluation and further recommendations.S/P ORIF right femur on 12.21.17. Pain control. PT/OT.         Dementia without behavioral disturbance    Stable; will continue her home regimen of donepezil.        Urinary retention    Tidwell placed as she had >600cc PVR. Appreciate urology recs. Likely 2/2 anesthesia.        Anemia of chronic disease    The patient's H/H is stable and consistent with previous laboratory measurements, and the patient exhibits no signs or symptoms of acute bleeding; there is no indication for transfusion.  Will continue to monitor.        Hyperlipidemia    " Poorly-controlled; will continue patient's home regimen of atorvastatin.        Essential hypertension    Patient's blood pressure is poorly-controlled; will continue home regimen of chlorthalidone and lisinopril, and provide as-needed clonidine.        History of CVA (cerebrovascular accident)    Stable; will continue her home regimen of atorvastatin.        History of subdural hematoma    Stable; there are no acute issues.          VTE Risk Mitigation         Ordered     enoxaparin injection 40 mg  Daily     Route:  Subcutaneous        12/22/17 0852     Medium Risk of VTE  Once      12/20/17 2203     Place sequential compression device  Until discontinued      12/20/17 1758     Place VENUS hose  Until discontinued      12/20/17 1758              Carmela Ye MD  Department of Hospital Medicine   [facility]

## 2017-12-24 NOTE — HPI
Urinary Retention  Patient complains of urinary retention. Onset of retention was 1 day ago and was sudden in onset. Patient currently does have a urinary catheter in place.  650 ml of urine were drained when catheter was placed. Prior to this event voiding symptoms consisted of nocturia. Prior treatments include none. Recent medications that may have affected her voiding include general anesthesia.

## 2017-12-24 NOTE — PROGRESS NOTES
Pt is more alert today  VSS    Rt leg- poor effort, appears due to pain,  But know deficits noted; No d/c through the dsg  Hct 22.4    A/P Rt distal femur fx  1. Agree with PRBC transfusion of 2 U. Consent signed  2. Up with PT  3. SNF soon, once stable

## 2017-12-24 NOTE — SUBJECTIVE & OBJECTIVE
Interval History: able to remove restraints    Review of Systems   Unable to perform ROS: Dementia     Objective:     Vital Signs (Most Recent):  Temp: 97.2 °F (36.2 °C) (12/23/17 1730)  Pulse: 82 (12/23/17 1730)  Resp: 18 (12/23/17 1730)  BP: 135/65 (12/23/17 1730)  SpO2: 95 % (12/23/17 1730) Vital Signs (24h Range):  Temp:  [97.2 °F (36.2 °C)-99 °F (37.2 °C)] 97.2 °F (36.2 °C)  Pulse:  [75-91] 82  Resp:  [16-18] 18  SpO2:  [94 %-98 %] 95 %  BP: (123-135)/(58-85) 135/65     Weight: 69.3 kg (152 lb 12.8 oz)  Body mass index is 25.43 kg/m².    Intake/Output Summary (Last 24 hours) at 12/23/17 1836  Last data filed at 12/23/17 1300   Gross per 24 hour   Intake                0 ml   Output              350 ml   Net             -350 ml      Physical Exam   Constitutional: She appears well-developed and well-nourished. No distress.   HENT:   Right Ear: External ear normal.   Left Ear: External ear normal.   Nose: Nose normal.   Mouth/Throat: Oropharynx is clear and moist.   Eyes: EOM are normal. Pupils are equal, round, and reactive to light. No scleral icterus.   Neck: Normal range of motion. Neck supple. No thyromegaly present.   Cardiovascular: Normal rate and normal heart sounds.  Exam reveals no friction rub.    No murmur heard.  Pulmonary/Chest: Effort normal and breath sounds normal. No respiratory distress. She has no wheezes. She has no rales.   Abdominal: Soft. Bowel sounds are normal. She exhibits no mass. There is no tenderness.   No hepatosplenomegaly   Musculoskeletal: She exhibits no edema or deformity.   Neurological: She is alert. No cranial nerve deficit.   Confused   Skin: Skin is warm and dry. No pallor.   Surgical site bandaged, c/d/i       Significant Labs: All pertinent labs within the past 24 hours have been reviewed.

## 2017-12-24 NOTE — PT/OT/SLP PROGRESS
"Physical Therapy Treatment    Patient Name:  Oksana Estrada   MRN:  [id number]    Recommendations:     Discharge Recommendations:  nursing facility, skilled   Discharge Equipment Recommendations: none   Barriers to discharge: Decreased caregiver support and unable to stand without increased assistance    Assessment:     Oksana Estrada is a 87 y.o. female admitted with a medical diagnosis of Closed fracture of distal end of right femur.  She presents with the following impairments/functional limitations:  weakness, impaired functional mobilty, impaired endurance, gait instability, decreased coordination, decreased safety awareness, impaired cognition, impaired coordination, pain, impaired balance, decreased upper extremity function, decreased lower extremity function, impaired self care skills, decreased ROM, edema, orthopedic precautions, impaired skin.  Pt required max A for rolling and has difficulty following commands.  Performed supine exercises with AAROM and constant cues for redirection for completion.    Rehab Prognosis:  fair; patient would benefit from acute skilled PT services to address these deficits and reach maximum level of function.      Recent Surgery: Procedure(s) (LRB):  OPEN REDUCTION INTERNAL FIXATION-HIP (Right) 3 Days Post-Op    Plan:     During this hospitalization, patient to be seen daily to address the above listed problems via gait training, therapeutic activities, therapeutic exercises  · Plan of Care Expires:  01/19/18   Plan of Care Reviewed with: patient    Subjective     Communicated with pt's nurse, Marcia, prior to session.  Patient found supine on bed pan upon PT entry to room, agreeable to treatment.      Chief Complaint: Needing to use the bathroom  Patient comments/goals: Pt states " My mouth is dry."  Pain/Comfort:  · Pain Rating 1:  (yes, but unable to rate 2/2 cognition)  · Pain Addressed 1: Reposition, Distraction, Cessation of Activity, Nurse " notified    Patients cultural, spiritual, Scientology conflicts given the current situation: none    Objective:     Patient found with: pressure relief boots, lara catheter, peripheral IV     General Precautions: Standard, fall   Orthopedic Precautions:Full weight bearing   Braces:       Functional Mobility: Requires extra time to perform  · Bed Mobility:     · Rolling Left:  maximal assistance  · Rolling Right: maximal assistance   · Scooting: Dep A x2 to scoot to EOB      AM-PAC 6 CLICK MOBILITY  Turning over in bed (including adjusting bedclothes, sheets and blankets)?: 2  Sitting down on and standing up from a chair with arms (e.g., wheelchair, bedside commode, etc.): 2  Moving from lying on back to sitting on the side of the bed?: 2  Moving to and from a bed to a chair (including a wheelchair)?: 2  Need to walk in hospital room?: 1  Climbing 3-5 steps with a railing?: 1  Total Score: 10       Therapeutic Activities and Exercises:   Pt performed RLE exercises AAROM x10 reps including: ankle pumps, quad sets, SAQs  (Requires extra time to perform due to decreased cognition and requiring redirection.)    Patient left L sidelying with pressure relief boots and RLE elvevated with all lines intact, call button in reach, bed alarm on and pt's nurse, Marcia, notified..    GOALS:    Physical Therapy Goals        Problem: Physical Therapy Goal    Goal Priority Disciplines Outcome Goal Variances Interventions   Physical Therapy Goal     PT/OT, PT Ongoing (interventions implemented as appropriate)     Description:  Goals to be met by: 2017     Patient will increase functional independence with mobility by performin. Supine to sit with Stand-by Assistance  2. Sit to stand transfer with Minimal Assistance  3. Bed to chair transfer with Minimal Assistance   4. Gait  To be assessed .   5. Sitting at edge of bed x15 minutes with Supervision  6. Lower extremity exercise program x10 reps per handout, with  assistance as needed                    Time Tracking:     PT Received On: 12/24/17  PT Start Time: 1110     PT Stop Time: 1135  PT Total Time (min): 25 min     Billable Minutes: Therapeutic Activity 16 and Therapeutic Exercise 9    Treatment Type: Treatment  PT/PTA: PTA     PTA Visit Number: 2     Summer Harding, PTA  12/24/2017

## 2017-12-24 NOTE — ASSESSMENT & PLAN NOTE
"Patient had a fall and has sustained a fracture.  Plain radiograph was significant for "[a]cute comminuted fracture distal right femur just above the knee arthroplasty."  Her Revised Cardiac Risk Index suggests that she is at low risk for perioperative cardiac events.Echo on 10/2017 show preserved EF, Orthopedic Surgery for evaluation and further recommendations.S/P ORIF right femur on 12.21.17. Pain control. PT/OT.   "

## 2017-12-24 NOTE — CONSULTS
[facility]  Urology  Consult Note    Patient Name: Oksana Estrada  MRN: [id number]  Admission Date: 12/20/2017  Hospital Length of Stay: 4   Code Status: Full Code   Attending Provider: Carmela Ye MD   Consulting Provider: MARY Briscoe MD  Primary Care Physician: Red Griffith MD  Principal Problem:Closed fracture of distal end of right femur    Inpatient consult to Urology  Consult performed by: SHEN BRISCOE  Consult ordered by: CARMELA YE          Subjective:     HPI:  Urinary Retention  Patient complains of urinary retention. Onset of retention was 1 day ago and was sudden in onset. Patient currently does have a urinary catheter in place.  650 ml of urine were drained when catheter was placed. Prior to this event voiding symptoms consisted of nocturia. Prior treatments include none. Recent medications that may have affected her voiding include general anesthesia.        Past Medical History:   Diagnosis Date    Dementia     Depression     Hypertension     Stroke        Past Surgical History:   Procedure Laterality Date    BREAST SURGERY      JOINT REPLACEMENT      bilateral knee replacement       Review of patient's allergies indicates:   Allergen Reactions    Codeine     Penicillins        Family History     None          Social History Main Topics    Smoking status: Former Smoker     Types: Cigarettes    Smokeless tobacco: Never Used      Comment: quit 25 years ago    Alcohol use No    Drug use: No    Sexual activity: No       Review of Systems   Constitutional: Negative.  Negative for fever.   HENT: Negative.    Eyes: Negative.    Respiratory: Negative for cough, chest tightness and shortness of breath.    Cardiovascular: Negative for chest pain.   Gastrointestinal: Negative.  Negative for constipation, diarrhea and nausea.   Genitourinary: Positive for difficulty urinating.   Musculoskeletal: Negative.    Neurological: Negative.     Psychiatric/Behavioral: Negative.        Objective:     Temp:  [97.2 °F (36.2 °C)-99 °F (37.2 °C)] 99 °F (37.2 °C)  Pulse:  [74-87] 87  Resp:  [18-20] 18  SpO2:  [94 %-99 %] 94 %  BP: (116-135)/(53-65) 133/63     Body mass index is 25.43 kg/m².            Drains     Drain                 Urethral Catheter 12/23/17 1204 less than 1 day                Physical Exam   Nursing note and vitals reviewed.  Constitutional: She is oriented to person, place, and time. She appears well-developed.   HENT:   Head: Normocephalic.   Eyes: Conjunctivae are normal.   Neck: Normal range of motion. No tracheal deviation present. No thyromegaly present.   Cardiovascular: Normal rate, normal heart sounds and normal pulses.    Pulmonary/Chest: Effort normal and breath sounds normal. No respiratory distress. She has no wheezes.   Abdominal: Soft. She exhibits no distension and no mass. There is no hepatosplenomegaly. There is no tenderness. There is no rebound, no guarding and no CVA tenderness. No hernia.   Genitourinary:   Genitourinary Comments: Tidwell in place with yellow urine   Musculoskeletal: Normal range of motion. She exhibits no edema or tenderness.   Lymphadenopathy:     She has no cervical adenopathy.   Neurological: She is alert and oriented to person, place, and time.   Skin: Skin is warm and dry. No rash noted. No erythema.     Psychiatric: She has a normal mood and affect. Her behavior is normal. Judgment and thought content normal.       Significant Labs:    BMP:    Recent Labs  Lab 12/21/17 0525 12/23/17 0452 12/24/17 0418   * 132* 135*   K 3.6 3.2* 3.1*   CL 99 98 101   CO2 23 23 24   BUN 12 18 23   CREATININE 0.8 1.0 0.9   CALCIUM 8.8 8.3* 8.2*       CBC:    Recent Labs  Lab 12/22/17 0448 12/23/17 0452 12/24/17  0418   WBC 10.54 12.24 7.87   HGB 9.4* 8.5* 7.7*   HCT 27.5* 23.8* 22.4*    185 208       Blood Culture: No results for input(s): LABBLOO in the last 168 hours.  Urine Culture:   Recent  Labs  Lab 12/20/17  1515   LABURIN No growth       Significant Imaging:                      Assessment and Plan:     Urinary retention    Urine culture  Keep Tidwell x 3 days            VTE Risk Mitigation         Ordered     enoxaparin injection 40 mg  Daily     Route:  Subcutaneous        12/22/17 0852     Medium Risk of VTE  Once      12/20/17 2203     Place sequential compression device  Until discontinued      12/20/17 1758     Place VENUS hose  Until discontinued      12/20/17 1758          Thank you for your consult. I will follow-up with patient. Please contact us if you have any additional questions.    MARY Briscoe MD  Urology  [facility]

## 2017-12-25 LAB
ANION GAP SERPL CALC-SCNC: 11 MMOL/L
BASOPHILS # BLD AUTO: 0 K/UL
BASOPHILS NFR BLD: 0 %
BUN SERPL-MCNC: 26 MG/DL
CALCIUM SERPL-MCNC: 8.5 MG/DL
CHLORIDE SERPL-SCNC: 102 MMOL/L
CO2 SERPL-SCNC: 24 MMOL/L
CREAT SERPL-MCNC: 0.9 MG/DL
DIFFERENTIAL METHOD: ABNORMAL
EOSINOPHIL # BLD AUTO: 0 K/UL
EOSINOPHIL NFR BLD: 0.1 %
ERYTHROCYTE [DISTWIDTH] IN BLOOD BY AUTOMATED COUNT: 16.2 %
EST. GFR  (AFRICAN AMERICAN): >60 ML/MIN/1.73 M^2
EST. GFR  (NON AFRICAN AMERICAN): 58 ML/MIN/1.73 M^2
GLUCOSE SERPL-MCNC: 135 MG/DL
HCT VFR BLD AUTO: 27.8 %
HGB BLD-MCNC: 9.3 G/DL
LYMPHOCYTES # BLD AUTO: 0.7 K/UL
LYMPHOCYTES NFR BLD: 8.1 %
MCH RBC QN AUTO: 29.2 PG
MCHC RBC AUTO-ENTMCNC: 33.5 G/DL
MCV RBC AUTO: 87 FL
MONOCYTES # BLD AUTO: 0.7 K/UL
MONOCYTES NFR BLD: 9 %
NEUTROPHILS # BLD AUTO: 6.7 K/UL
NEUTROPHILS NFR BLD: 82.8 %
PLATELET # BLD AUTO: 238 K/UL
PMV BLD AUTO: 10.6 FL
POTASSIUM SERPL-SCNC: 3 MMOL/L
RBC # BLD AUTO: 3.18 M/UL
SODIUM SERPL-SCNC: 137 MMOL/L
WBC # BLD AUTO: 8.14 K/UL

## 2017-12-25 PROCEDURE — 63600175 PHARM REV CODE 636 W HCPCS: Performed by: HOSPITALIST

## 2017-12-25 PROCEDURE — 12000002 HC ACUTE/MED SURGE SEMI-PRIVATE ROOM

## 2017-12-25 PROCEDURE — 85025 COMPLETE CBC W/AUTO DIFF WBC: CPT

## 2017-12-25 PROCEDURE — 99232 SBSQ HOSP IP/OBS MODERATE 35: CPT | Mod: ,,, | Performed by: UROLOGY

## 2017-12-25 PROCEDURE — 25000003 PHARM REV CODE 250: Performed by: INTERNAL MEDICINE

## 2017-12-25 PROCEDURE — 25000003 PHARM REV CODE 250: Performed by: ORTHOPAEDIC SURGERY

## 2017-12-25 PROCEDURE — 25000003 PHARM REV CODE 250: Performed by: EMERGENCY MEDICINE

## 2017-12-25 PROCEDURE — 36415 COLL VENOUS BLD VENIPUNCTURE: CPT

## 2017-12-25 PROCEDURE — 80048 BASIC METABOLIC PNL TOTAL CA: CPT

## 2017-12-25 RX ORDER — LANOLIN ALCOHOL/MO/W.PET/CERES
400 CREAM (GRAM) TOPICAL ONCE
Status: DISCONTINUED | OUTPATIENT
Start: 2017-12-25 | End: 2017-12-26

## 2017-12-25 RX ORDER — POTASSIUM CHLORIDE 20 MEQ/1
40 TABLET, EXTENDED RELEASE ORAL EVERY 4 HOURS
Status: DISCONTINUED | OUTPATIENT
Start: 2017-12-25 | End: 2017-12-26

## 2017-12-25 RX ADMIN — ATORVASTATIN CALCIUM 20 MG: 10 TABLET, FILM COATED ORAL at 08:12

## 2017-12-25 RX ADMIN — ACETAMINOPHEN 500 MG: 500 TABLET ORAL at 02:12

## 2017-12-25 RX ADMIN — QUETIAPINE FUMARATE 25 MG: 25 TABLET, FILM COATED ORAL at 09:12

## 2017-12-25 RX ADMIN — DONEPEZIL HYDROCHLORIDE 5 MG: 5 TABLET, FILM COATED ORAL at 09:12

## 2017-12-25 RX ADMIN — FAMOTIDINE 20 MG: 20 TABLET, FILM COATED ORAL at 08:12

## 2017-12-25 RX ADMIN — MUPIROCIN 1 G: 20 OINTMENT TOPICAL at 08:12

## 2017-12-25 RX ADMIN — MUPIROCIN 1 G: 20 OINTMENT TOPICAL at 09:12

## 2017-12-25 RX ADMIN — ENOXAPARIN SODIUM 40 MG: 100 INJECTION SUBCUTANEOUS at 04:12

## 2017-12-25 RX ADMIN — DOCUSATE SODIUM AND SENNOSIDES 1 TABLET: 8.6; 5 TABLET, FILM COATED ORAL at 08:12

## 2017-12-25 RX ADMIN — DOCUSATE SODIUM AND SENNOSIDES 1 TABLET: 8.6; 5 TABLET, FILM COATED ORAL at 09:12

## 2017-12-25 RX ADMIN — POTASSIUM CHLORIDE 40 MEQ: 1500 TABLET, EXTENDED RELEASE ORAL at 09:12

## 2017-12-25 RX ADMIN — CHLORTHALIDONE 25 MG: 25 TABLET ORAL at 08:12

## 2017-12-25 RX ADMIN — LISINOPRIL 20 MG: 20 TABLET ORAL at 08:12

## 2017-12-25 RX ADMIN — FAMOTIDINE 20 MG: 20 TABLET, FILM COATED ORAL at 09:12

## 2017-12-25 NOTE — PLAN OF CARE
Problem: Patient Care Overview  Goal: Plan of Care Review  Outcome: Ongoing (interventions implemented as appropriate)  Pt turn Q2, pt L knee aquacel CDI, not tolerating meals this shift, lara initiated to gravity, heels elevated,safety maintained, mulit BMs this shift, medicated x1 tylenol r/t shoulder pain, safety maintained,bed low locked and in position, will cont plan of care

## 2017-12-25 NOTE — ASSESSMENT & PLAN NOTE
Tidwell placed as she had >600cc PVR. Appreciate urology recs. Likely 2/2 anesthesia. Voiding trial 12/26.

## 2017-12-25 NOTE — SUBJECTIVE & OBJECTIVE
Interval History: No new  issues.    Review of Systems   Constitutional: Negative.    HENT: Negative.    Eyes: Negative.    Respiratory: Negative for cough, chest tightness and shortness of breath.    Cardiovascular: Negative for chest pain.   Gastrointestinal: Negative.  Negative for constipation, diarrhea and nausea.   Genitourinary: Negative for hematuria.   Musculoskeletal: Negative.    Neurological: Negative.    Psychiatric/Behavioral: Negative.      Objective:     Temp:  [98.5 °F (36.9 °C)-100.2 °F (37.9 °C)] 98.5 °F (36.9 °C)  Pulse:  [72-91] 72  Resp:  [16-20] 17  SpO2:  [94 %-100 %] 95 %  BP: (119-157)/(67-96) 157/71     Body mass index is 25.43 kg/m².            Drains     Drain                 Urethral Catheter 12/23/17 1204 1 day                Physical Exam   Nursing note and vitals reviewed.  Constitutional: She is oriented to person, place, and time. She appears well-developed.   HENT:   Head: Normocephalic.   Eyes: Conjunctivae are normal.   Neck: Normal range of motion. No tracheal deviation present. No thyromegaly present.   Cardiovascular: Normal rate, normal heart sounds and normal pulses.    Pulmonary/Chest: Effort normal and breath sounds normal. No respiratory distress. She has no wheezes.   Abdominal: Soft. She exhibits no distension and no mass. There is no hepatosplenomegaly. There is no tenderness. There is no rebound, no guarding and no CVA tenderness. No hernia.   Musculoskeletal: Normal range of motion. She exhibits no edema or tenderness.   Lymphadenopathy:     She has no cervical adenopathy.   Neurological: She is alert and oriented to person, place, and time.   Skin: Skin is warm and dry. No rash noted. No erythema.     Psychiatric: She has a normal mood and affect. Her behavior is normal. Judgment and thought content normal.       Significant Labs:    BMP:    Recent Labs  Lab 12/21/17  0525 12/23/17  0452 12/24/17  0418   * 132* 135*   K 3.6 3.2* 3.1*   CL 99 98 101   CO2 23  23 24   BUN 12 18 23   CREATININE 0.8 1.0 0.9   CALCIUM 8.8 8.3* 8.2*       CBC:     Recent Labs  Lab 12/22/17  0448 12/23/17  0452 12/24/17  0418   WBC 10.54 12.24 7.87   HGB 9.4* 8.5* 7.7*   HCT 27.5* 23.8* 22.4*    185 208       Blood Culture: No results for input(s): LABBLOO in the last 168 hours.  Urine Culture:   Recent Labs  Lab 12/20/17  1515 12/24/17  1612   LABURIN No growth PRESUMPTIVE E COLI> 100,000 cfu/mlIdentification and susceptibility pending       Significant Imaging:

## 2017-12-25 NOTE — PROGRESS NOTES
Patient able to participate today and stated her pain is decreased.  VSSAF    Right LE: Dressing c/d/i.  Hct: 27.8    A/P: s/p ORIF right distal femur frature  1) up with PT  2) to SNF once stable  3) f/u with Dr meraz two weeks post op

## 2017-12-25 NOTE — ASSESSMENT & PLAN NOTE
Better controlled; continue home regimen of chlorthalidone and lisinopril, and provide as-needed clonidine.

## 2017-12-25 NOTE — PROGRESS NOTES
[facility]  Urology  Progress Note    Patient Name: Oksana Estrada  MRN: [id number]  Admission Date: 12/20/2017  Hospital Length of Stay: 5 days  Code Status: Full Code   Attending Provider: Carmela Ye MD   Primary Care Physician: Red Griffith MD    Subjective:     HPI:  Urinary Retention  Patient complains of urinary retention. Onset of retention was 1 day ago and was sudden in onset. Patient currently does have a urinary catheter in place.  650 ml of urine were drained when catheter was placed. Prior to this event voiding symptoms consisted of nocturia. Prior treatments include none. Recent medications that may have affected her voiding include general anesthesia.        Interval History: No new  issues.    Review of Systems   Constitutional: Negative.    HENT: Negative.    Eyes: Negative.    Respiratory: Negative for cough, chest tightness and shortness of breath.    Cardiovascular: Negative for chest pain.   Gastrointestinal: Negative.  Negative for constipation, diarrhea and nausea.   Genitourinary: Negative for hematuria.   Musculoskeletal: Negative.    Neurological: Negative.    Psychiatric/Behavioral: Negative.      Objective:     Temp:  [98.5 °F (36.9 °C)-100.2 °F (37.9 °C)] 98.5 °F (36.9 °C)  Pulse:  [72-91] 72  Resp:  [16-20] 17  SpO2:  [94 %-100 %] 95 %  BP: (119-157)/(67-96) 157/71     Body mass index is 25.43 kg/m².            Drains     Drain                 Urethral Catheter 12/23/17 1204 1 day                Physical Exam   Nursing note and vitals reviewed.  Constitutional: She is oriented to person, place, and time. She appears well-developed.   HENT:   Head: Normocephalic.   Eyes: Conjunctivae are normal.   Neck: Normal range of motion. No tracheal deviation present. No thyromegaly present.   Cardiovascular: Normal rate, normal heart sounds and normal pulses.    Pulmonary/Chest: Effort normal and breath sounds normal. No respiratory distress. She has no  wheezes.   Abdominal: Soft. She exhibits no distension and no mass. There is no hepatosplenomegaly. There is no tenderness. There is no rebound, no guarding and no CVA tenderness. No hernia.   Musculoskeletal: Normal range of motion. She exhibits no edema or tenderness.   Lymphadenopathy:     She has no cervical adenopathy.   Neurological: She is alert and oriented to person, place, and time.   Skin: Skin is warm and dry. No rash noted. No erythema.     Psychiatric: She has a normal mood and affect. Her behavior is normal. Judgment and thought content normal.       Significant Labs:    BMP:    Recent Labs  Lab 12/21/17  0525 12/23/17  0452 12/24/17  0418   * 132* 135*   K 3.6 3.2* 3.1*   CL 99 98 101   CO2 23 23 24   BUN 12 18 23   CREATININE 0.8 1.0 0.9   CALCIUM 8.8 8.3* 8.2*       CBC:     Recent Labs  Lab 12/22/17  0448 12/23/17  0452 12/24/17  0418   WBC 10.54 12.24 7.87   HGB 9.4* 8.5* 7.7*   HCT 27.5* 23.8* 22.4*    185 208       Blood Culture: No results for input(s): LABBLOO in the last 168 hours.  Urine Culture:   Recent Labs  Lab 12/20/17  1515 12/24/17  1612   LABURIN No growth PRESUMPTIVE E COLI> 100,000 cfu/mlIdentification and susceptibility pending       Significant Imaging:                    Assessment/Plan:     Urinary retention    Urine culture - in progress  Voiding Trial in AM            VTE Risk Mitigation         Ordered     enoxaparin injection 40 mg  Daily     Route:  Subcutaneous        12/22/17 0852     Medium Risk of VTE  Once      12/20/17 2203     Place sequential compression device  Until discontinued      12/20/17 1758     Place VENUS hose  Until discontinued      12/20/17 1758          MARY Briscoe MD  Urology  [facility]

## 2017-12-25 NOTE — PROGRESS NOTES
[facility] Hospital Medicine  Progress Note    Patient Name: Oksana Estrada  MRN: [id number]  Patient Class: IP- Inpatient   Admission Date: 12/20/2017  Length of Stay: 4 days  Attending Physician: Carmela Ye MD  Primary Care Provider: Red Griffith MD        Subjective:     Principal Problem:Closed fracture of distal end of right femur    HPI:  Mrs. Oksana Estrada is a 87 y.o. female with essential hypertension, hyperlipidemia (.0 May 2018), anemia of chronic disease, dementia, history of CVA, and history of subdural hematoma who presents to [facility] ED with complaints of fall today.  She had some right leg pain after a fall today while she was trying to get out of bed to use the restroom.  EMS was activated and she was given fentanyl for pain and transported to this facility.  Further history is limited at this time.    Hospital Course:  X ray showed acute comminuted fracture distal right femur just above the knee arthroplasty. She was taken for ORIF right hip on 12.21.17. PT/OT consulted. She has dementia and briefly required restraints. SNF placement pending. She had acute blood loss anemia as to be expected with surgery and was transfused RBCs.    Interval History: more alert and appropriate     Review of Systems   Constitutional: Positive for appetite change. Negative for chills and fever.   HENT: Negative for congestion and rhinorrhea.    Eyes: Negative for photophobia and visual disturbance.   Respiratory: Negative for cough and shortness of breath.    Cardiovascular: Negative for chest pain and leg swelling.   Gastrointestinal: Negative for abdominal pain, nausea and vomiting.   Genitourinary: Positive for difficulty urinating.   Psychiatric/Behavioral: Positive for confusion. Negative for agitation.     Objective:     Vital Signs (Most Recent):  Temp: 99.7 °F (37.6 °C) (12/24/17 1615)  Pulse: 87 (12/24/17 1615)  Resp: 18 (12/24/17 1615)  BP: (!) 154/68  "(17 1615)  SpO2: 100 % (17 1615) Vital Signs (24h Range):  Temp:  [97.4 °F (36.3 °C)-100.2 °F (37.9 °C)] 99.7 °F (37.6 °C)  Pulse:  [74-89] 87  Resp:  [16-20] 18  SpO2:  [94 %-100 %] 100 %  BP: (116-157)/(53-96) 154/68     Weight: 69.3 kg (152 lb 12.8 oz)  Body mass index is 25.43 kg/m².    Intake/Output Summary (Last 24 hours) at 17 1855  Last data filed at 17 1845   Gross per 24 hour   Intake              360 ml   Output             1500 ml   Net            -1140 ml      Physical Exam   Constitutional: She appears well-developed and well-nourished. No distress.   HENT:   Right Ear: External ear normal.   Left Ear: External ear normal.   Nose: Nose normal.   Mouth/Throat: Oropharynx is clear and moist.   Eyes: EOM are normal. Pupils are equal, round, and reactive to light. No scleral icterus.   Neck: Normal range of motion. Neck supple. No thyromegaly present.   Cardiovascular: Normal rate and normal heart sounds.  Exam reveals no friction rub.    No murmur heard.  Pulmonary/Chest: Effort normal and breath sounds normal. No respiratory distress. She has no wheezes. She has no rales.   Abdominal: Soft. Bowel sounds are normal. She exhibits no mass. There is no tenderness.   Musculoskeletal: She exhibits no edema or deformity.   Neurological: She is alert. No cranial nerve deficit.   Oriented to person, place,    Skin: Skin is warm and dry. No pallor.   Surgical site bandaged, c/d/i       Significant Labs: All pertinent labs within the past 24 hours have been reviewed.      Assessment/Plan:      * Closed fracture of distal end of right femur    Patient had a fall and has sustained a fracture.  Plain radiograph was significant for "[a]cute comminuted fracture distal right femur just above the knee arthroplasty."  Her Revised Cardiac Risk Index suggests that she is at low risk for perioperative cardiac events.Echo on 10/2017 show preserved EF, Orthopedic Surgery for evaluation and further " recommendations.S/P ORIF right femur on 12.21.17. Pain control. PT/OT.         Dementia without behavioral disturbance    Stable; will continue her home regimen of donepezil.        Urinary retention    Tidwell placed as she had >600cc PVR. Appreciate urology recs. Likely 2/2 anesthesia.        Acute blood loss anemia    Not unexpected with surgery. Transfuse and monitor.        Anemia of chronic disease    Chronic. See above.        Hyperlipidemia    Poorly-controlled; will continue patient's home regimen of atorvastatin.        Essential hypertension    Better controlled; continue home regimen of chlorthalidone and lisinopril, and provide as-needed clonidine.        History of CVA (cerebrovascular accident)    Stable; will continue her home regimen of atorvastatin.        History of subdural hematoma    Stable; there are no acute issues.          VTE Risk Mitigation         Ordered     enoxaparin injection 40 mg  Daily     Route:  Subcutaneous        12/22/17 0852     Medium Risk of VTE  Once      12/20/17 2203     Place sequential compression device  Until discontinued      12/20/17 1758     Place VENUS hose  Until discontinued      12/20/17 1758              Carmela Ye MD  Department of Hospital Medicine   [facility]

## 2017-12-25 NOTE — PROGRESS NOTES
[facility] Hospital Medicine  Progress Note    Patient Name: Oksana Estrada  MRN: [id number]  Patient Class: IP- Inpatient   Admission Date: 12/20/2017  Length of Stay: 5 days  Attending Physician: Carmela Ye MD  Primary Care Provider: Red Griffith MD        Subjective:     Principal Problem:Closed fracture of distal end of right femur    HPI:  Mrs. Oksana Estrada is a 87 y.o. female with essential hypertension, hyperlipidemia (.0 May 2018), anemia of chronic disease, dementia, history of CVA, and history of subdural hematoma who presents to [facility] ED with complaints of fall today.  She had some right leg pain after a fall today while she was trying to get out of bed to use the restroom.  EMS was activated and she was given fentanyl for pain and transported to this facility.  Further history is limited at this time.    Hospital Course:  X ray showed acute comminuted fracture distal right femur just above the knee arthroplasty. She was taken for ORIF right hip on 12.21.17. PT/OT consulted. She has dementia and briefly required restraints. SNF placement pending. She had acute blood loss anemia as to be expected with surgery and was transfused RBCs. She had urinary retention requiring a Tidwell - voiding trial 12/26.    Interval History: complaining of increased pain     Review of Systems   Constitutional: Positive for appetite change. Negative for chills and fever.   HENT: Negative for congestion and rhinorrhea.    Eyes: Negative for photophobia and visual disturbance.   Respiratory: Negative for cough and shortness of breath.    Cardiovascular: Negative for chest pain and leg swelling.   Gastrointestinal: Negative for abdominal pain, nausea and vomiting.   Genitourinary: Positive for difficulty urinating.   Psychiatric/Behavioral: Positive for confusion. Negative for agitation.     Objective:     Vital Signs (Most Recent):  Temp: 98.2 °F (36.8 °C) (12/25/17  "1202)  Pulse: 66 (17 1202)  Resp: 18 (17 1202)  BP: 132/62 (17 1202)  SpO2: 97 % (17 1202) Vital Signs (24h Range):  Temp:  [98.2 °F (36.8 °C)-100.2 °F (37.9 °C)] 98.2 °F (36.8 °C)  Pulse:  [66-91] 66  Resp:  [16-20] 18  SpO2:  [94 %-100 %] 97 %  BP: (119-157)/(62-89) 132/62     Weight: 69.3 kg (152 lb 12.8 oz)  Body mass index is 25.43 kg/m².    Intake/Output Summary (Last 24 hours) at 17 1544  Last data filed at 17 0600   Gross per 24 hour   Intake           943.33 ml   Output             1450 ml   Net          -506.67 ml      Physical Exam   Constitutional: She appears well-developed and well-nourished. No distress.   HENT:   Right Ear: External ear normal.   Left Ear: External ear normal.   Nose: Nose normal.   Mouth/Throat: Oropharynx is clear and moist.   Eyes: EOM are normal. Pupils are equal, round, and reactive to light. No scleral icterus.   Neck: Normal range of motion. Neck supple. No thyromegaly present.   Cardiovascular: Normal rate and normal heart sounds.  Exam reveals no friction rub.    No murmur heard.  Pulmonary/Chest: Effort normal and breath sounds normal. No respiratory distress. She has no wheezes. She has no rales.   Abdominal: Soft. Bowel sounds are normal. She exhibits no mass. There is no tenderness.   Musculoskeletal: She exhibits no edema or deformity.   Neurological: She is alert. No cranial nerve deficit.   Oriented to person, place,    Skin: Skin is warm and dry. No pallor.   Surgical site bandaged, c/d/i       Significant Labs: All pertinent labs within the past 24 hours have been reviewed.      Assessment/Plan:      * Closed fracture of distal end of right femur    Patient had a fall and has sustained a fracture.  Plain radiograph was significant for "[a]cute comminuted fracture distal right femur just above the knee arthroplasty."  Her Revised Cardiac Risk Index suggests that she is at low risk for perioperative cardiac events.Echo on 10/2017 " show preserved EF, Orthopedic Surgery for evaluation and further recommendations.S/P ORIF right femur on 12.21.17. Pain control. PT/OT.         Dementia without behavioral disturbance    Stable; will continue her home regimen of donepezil.        Urinary retention    Tidwell placed as she had >600cc PVR. Appreciate urology recs. Likely 2/2 anesthesia. Voiding trial 12/26.        Acute blood loss anemia    Not unexpected with surgery. Transfuse and monitor.        Anemia of chronic disease    Chronic. See above.        Hyperlipidemia    Poorly-controlled; will continue patient's home regimen of atorvastatin.        Essential hypertension    Better controlled; continue home regimen of chlorthalidone and lisinopril, and provide as-needed clonidine.        History of CVA (cerebrovascular accident)    Stable; will continue her home regimen of atorvastatin.        History of subdural hematoma    Stable; there are no acute issues.          VTE Risk Mitigation         Ordered     enoxaparin injection 40 mg  Daily     Route:  Subcutaneous        12/22/17 0852     Medium Risk of VTE  Once      12/20/17 2203     Place sequential compression device  Until discontinued      12/20/17 1758     Place VENUS hose  Until discontinued      12/20/17 1758              Carmela Ye MD  Department of Hospital Medicine   [facility]

## 2017-12-25 NOTE — SUBJECTIVE & OBJECTIVE
Interval History: more alert and appropriate     Review of Systems   Constitutional: Positive for appetite change. Negative for chills and fever.   HENT: Negative for congestion and rhinorrhea.    Eyes: Negative for photophobia and visual disturbance.   Respiratory: Negative for cough and shortness of breath.    Cardiovascular: Negative for chest pain and leg swelling.   Gastrointestinal: Negative for abdominal pain, nausea and vomiting.   Genitourinary: Positive for difficulty urinating.   Psychiatric/Behavioral: Positive for confusion. Negative for agitation.     Objective:     Vital Signs (Most Recent):  Temp: 99.7 °F (37.6 °C) (12/24/17 1615)  Pulse: 87 (12/24/17 1615)  Resp: 18 (12/24/17 1615)  BP: (!) 154/68 (12/24/17 1615)  SpO2: 100 % (12/24/17 1615) Vital Signs (24h Range):  Temp:  [97.4 °F (36.3 °C)-100.2 °F (37.9 °C)] 99.7 °F (37.6 °C)  Pulse:  [74-89] 87  Resp:  [16-20] 18  SpO2:  [94 %-100 %] 100 %  BP: (116-157)/(53-96) 154/68     Weight: 69.3 kg (152 lb 12.8 oz)  Body mass index is 25.43 kg/m².    Intake/Output Summary (Last 24 hours) at 12/24/17 1855  Last data filed at 12/24/17 1845   Gross per 24 hour   Intake              360 ml   Output             1500 ml   Net            -1140 ml      Physical Exam   Constitutional: She appears well-developed and well-nourished. No distress.   HENT:   Right Ear: External ear normal.   Left Ear: External ear normal.   Nose: Nose normal.   Mouth/Throat: Oropharynx is clear and moist.   Eyes: EOM are normal. Pupils are equal, round, and reactive to light. No scleral icterus.   Neck: Normal range of motion. Neck supple. No thyromegaly present.   Cardiovascular: Normal rate and normal heart sounds.  Exam reveals no friction rub.    No murmur heard.  Pulmonary/Chest: Effort normal and breath sounds normal. No respiratory distress. She has no wheezes. She has no rales.   Abdominal: Soft. Bowel sounds are normal. She exhibits no mass. There is no tenderness.    Musculoskeletal: She exhibits no edema or deformity.   Neurological: She is alert. No cranial nerve deficit.   Oriented to person, place,    Skin: Skin is warm and dry. No pallor.   Surgical site bandaged, c/d/i       Significant Labs: All pertinent labs within the past 24 hours have been reviewed.

## 2017-12-25 NOTE — PLAN OF CARE
Problem: Patient Care Overview  Goal: Plan of Care Review  Outcome: Ongoing (interventions implemented as appropriate)   12/25/17 8991   Coping/Psychosocial   Plan Of Care Reviewed With patient   Pt free from falls, injury, and trauma. VSS. Pt received 2 unit of blood. Scheduled meds given. No NV. Denies any pain. Dressing CDI. No acute distress noted. Will continue to monitor

## 2017-12-25 NOTE — SUBJECTIVE & OBJECTIVE
Interval History: complaining of increased pain     Review of Systems   Constitutional: Positive for appetite change. Negative for chills and fever.   HENT: Negative for congestion and rhinorrhea.    Eyes: Negative for photophobia and visual disturbance.   Respiratory: Negative for cough and shortness of breath.    Cardiovascular: Negative for chest pain and leg swelling.   Gastrointestinal: Negative for abdominal pain, nausea and vomiting.   Genitourinary: Positive for difficulty urinating.   Psychiatric/Behavioral: Positive for confusion. Negative for agitation.     Objective:     Vital Signs (Most Recent):  Temp: 98.2 °F (36.8 °C) (12/25/17 1202)  Pulse: 66 (12/25/17 1202)  Resp: 18 (12/25/17 1202)  BP: 132/62 (12/25/17 1202)  SpO2: 97 % (12/25/17 1202) Vital Signs (24h Range):  Temp:  [98.2 °F (36.8 °C)-100.2 °F (37.9 °C)] 98.2 °F (36.8 °C)  Pulse:  [66-91] 66  Resp:  [16-20] 18  SpO2:  [94 %-100 %] 97 %  BP: (119-157)/(62-89) 132/62     Weight: 69.3 kg (152 lb 12.8 oz)  Body mass index is 25.43 kg/m².    Intake/Output Summary (Last 24 hours) at 12/25/17 1544  Last data filed at 12/25/17 0600   Gross per 24 hour   Intake           943.33 ml   Output             1450 ml   Net          -506.67 ml      Physical Exam   Constitutional: She appears well-developed and well-nourished. No distress.   HENT:   Right Ear: External ear normal.   Left Ear: External ear normal.   Nose: Nose normal.   Mouth/Throat: Oropharynx is clear and moist.   Eyes: EOM are normal. Pupils are equal, round, and reactive to light. No scleral icterus.   Neck: Normal range of motion. Neck supple. No thyromegaly present.   Cardiovascular: Normal rate and normal heart sounds.  Exam reveals no friction rub.    No murmur heard.  Pulmonary/Chest: Effort normal and breath sounds normal. No respiratory distress. She has no wheezes. She has no rales.   Abdominal: Soft. Bowel sounds are normal. She exhibits no mass. There is no tenderness.    Musculoskeletal: She exhibits no edema or deformity.   Neurological: She is alert. No cranial nerve deficit.   Oriented to person, place,    Skin: Skin is warm and dry. No pallor.   Surgical site bandaged, c/d/i       Significant Labs: All pertinent labs within the past 24 hours have been reviewed.

## 2017-12-26 PROBLEM — R33.9 URINARY RETENTION: Status: RESOLVED | Noted: 2017-12-23 | Resolved: 2017-12-26

## 2017-12-26 PROBLEM — N30.00 ACUTE CYSTITIS WITHOUT HEMATURIA: Status: ACTIVE | Noted: 2017-12-26

## 2017-12-26 PROBLEM — D62 ACUTE BLOOD LOSS ANEMIA: Status: RESOLVED | Noted: 2017-12-24 | Resolved: 2017-12-26

## 2017-12-26 LAB
ANION GAP SERPL CALC-SCNC: 8 MMOL/L
BACTERIA UR CULT: NORMAL
BUN SERPL-MCNC: 25 MG/DL
CALCIUM SERPL-MCNC: 8.7 MG/DL
CHLORIDE SERPL-SCNC: 102 MMOL/L
CO2 SERPL-SCNC: 29 MMOL/L
CREAT SERPL-MCNC: 0.8 MG/DL
EST. GFR  (AFRICAN AMERICAN): >60 ML/MIN/1.73 M^2
EST. GFR  (NON AFRICAN AMERICAN): >60 ML/MIN/1.73 M^2
GLUCOSE SERPL-MCNC: 91 MG/DL
MAGNESIUM SERPL-MCNC: 1.8 MG/DL
POTASSIUM SERPL-SCNC: 3.1 MMOL/L
SODIUM SERPL-SCNC: 139 MMOL/L

## 2017-12-26 PROCEDURE — 86580 TB INTRADERMAL TEST: CPT | Performed by: INTERNAL MEDICINE

## 2017-12-26 PROCEDURE — 63600175 PHARM REV CODE 636 W HCPCS: Performed by: HOSPITALIST

## 2017-12-26 PROCEDURE — 25000003 PHARM REV CODE 250: Performed by: EMERGENCY MEDICINE

## 2017-12-26 PROCEDURE — 25000003 PHARM REV CODE 250: Performed by: ORTHOPAEDIC SURGERY

## 2017-12-26 PROCEDURE — 25000003 PHARM REV CODE 250: Performed by: INTERNAL MEDICINE

## 2017-12-26 PROCEDURE — 12000002 HC ACUTE/MED SURGE SEMI-PRIVATE ROOM

## 2017-12-26 PROCEDURE — 83735 ASSAY OF MAGNESIUM: CPT

## 2017-12-26 PROCEDURE — 97530 THERAPEUTIC ACTIVITIES: CPT

## 2017-12-26 PROCEDURE — 99232 SBSQ HOSP IP/OBS MODERATE 35: CPT | Mod: ,,, | Performed by: UROLOGY

## 2017-12-26 PROCEDURE — 36415 COLL VENOUS BLD VENIPUNCTURE: CPT

## 2017-12-26 PROCEDURE — 97110 THERAPEUTIC EXERCISES: CPT

## 2017-12-26 PROCEDURE — 63600175 PHARM REV CODE 636 W HCPCS: Performed by: INTERNAL MEDICINE

## 2017-12-26 PROCEDURE — 80048 BASIC METABOLIC PNL TOTAL CA: CPT

## 2017-12-26 RX ORDER — CIPROFLOXACIN 500 MG/1
500 TABLET ORAL EVERY 12 HOURS
Status: DISCONTINUED | OUTPATIENT
Start: 2017-12-26 | End: 2017-12-26

## 2017-12-26 RX ORDER — POTASSIUM CHLORIDE 20 MEQ/15ML
40 SOLUTION ORAL EVERY 4 HOURS
Status: COMPLETED | OUTPATIENT
Start: 2017-12-26 | End: 2017-12-26

## 2017-12-26 RX ORDER — CIPROFLOXACIN 500 MG/1
500 TABLET ORAL EVERY 12 HOURS
Qty: 12 TABLET | Refills: 0
Start: 2017-12-26 | End: 2018-01-01

## 2017-12-26 RX ORDER — CIPROFLOXACIN 500 MG/1
500 TABLET ORAL EVERY 12 HOURS
Status: DISCONTINUED | OUTPATIENT
Start: 2017-12-26 | End: 2017-12-28 | Stop reason: HOSPADM

## 2017-12-26 RX ADMIN — QUETIAPINE FUMARATE 25 MG: 25 TABLET, FILM COATED ORAL at 10:12

## 2017-12-26 RX ADMIN — DONEPEZIL HYDROCHLORIDE 5 MG: 5 TABLET, FILM COATED ORAL at 10:12

## 2017-12-26 RX ADMIN — DOCUSATE SODIUM AND SENNOSIDES 1 TABLET: 8.6; 5 TABLET, FILM COATED ORAL at 10:12

## 2017-12-26 RX ADMIN — ATORVASTATIN CALCIUM 20 MG: 10 TABLET, FILM COATED ORAL at 09:12

## 2017-12-26 RX ADMIN — FAMOTIDINE 20 MG: 20 TABLET, FILM COATED ORAL at 10:12

## 2017-12-26 RX ADMIN — POTASSIUM CHLORIDE 40 MEQ: 20 SOLUTION ORAL at 10:12

## 2017-12-26 RX ADMIN — CIPROFLOXACIN HYDROCHLORIDE 500 MG: 500 TABLET, FILM COATED ORAL at 04:12

## 2017-12-26 RX ADMIN — Medication 5 UNITS: at 03:12

## 2017-12-26 RX ADMIN — CHLORTHALIDONE 25 MG: 25 TABLET ORAL at 09:12

## 2017-12-26 RX ADMIN — FAMOTIDINE 20 MG: 20 TABLET, FILM COATED ORAL at 09:12

## 2017-12-26 RX ADMIN — MUPIROCIN 1 G: 20 OINTMENT TOPICAL at 09:12

## 2017-12-26 RX ADMIN — POTASSIUM CHLORIDE 40 MEQ: 20 SOLUTION ORAL at 06:12

## 2017-12-26 RX ADMIN — ENOXAPARIN SODIUM 40 MG: 100 INJECTION SUBCUTANEOUS at 04:12

## 2017-12-26 RX ADMIN — LISINOPRIL 20 MG: 20 TABLET ORAL at 09:12

## 2017-12-26 NOTE — ASSESSMENT & PLAN NOTE
"Patient had a fall and has sustained a fracture.  Plain radiograph was significant for "[a]cute comminuted fracture distal right femur just above the knee arthroplasty."  S/P ORIF right femur on 12.21.17. Pain control. PT/OT.  SNF.  "

## 2017-12-26 NOTE — SUBJECTIVE & OBJECTIVE
Interval History: Void trial ordered for this AM. Tidwell remains in place now.     Review of Systems   Unable to perform ROS: Dementia     Objective:     Temp:  [98 °F (36.7 °C)-98.9 °F (37.2 °C)] 98 °F (36.7 °C)  Pulse:  [61-89] 64  Resp:  [17-20] 19  SpO2:  [95 %-98 %] 98 %  BP: (125-147)/(58-76) 147/64     Body mass index is 25.43 kg/m².            Drains     Drain                 Urethral Catheter 12/23/17 1204 2 days                Physical Exam   Vitals reviewed.  Constitutional: She appears well-developed and well-nourished. No distress.   HENT:   Head: Normocephalic and atraumatic.   Neck: Normal range of motion.   Cardiovascular: Normal rate and regular rhythm.    Pulmonary/Chest: Effort normal and breath sounds normal. No respiratory distress.   Abdominal: Soft. She exhibits no distension and no mass. There is no tenderness. There is no rebound and no guarding.   Genitourinary:   Genitourinary Comments: Tidwell in place   Musculoskeletal: Normal range of motion.   Skin: Skin is warm and dry. No rash noted. She is not diaphoretic. No erythema.         Significant Labs:    BMP:    Recent Labs  Lab 12/24/17  0418 12/25/17  0456 12/26/17  0458   * 137 139   K 3.1* 3.0* 3.1*    102 102   CO2 24 24 29   BUN 23 26* 25*   CREATININE 0.9 0.9 0.8   CALCIUM 8.2* 8.5* 8.7       CBC:     Recent Labs  Lab 12/23/17  0452 12/24/17  0418 12/25/17  0456   WBC 12.24 7.87 8.14   HGB 8.5* 7.7* 9.3*   HCT 23.8* 22.4* 27.8*    208 238       Urine Culture:   Recent Labs  Lab 12/20/17  1515 12/24/17  1612   LABURIN No growth ESCHERICHIA COLI> 100,000 cfu/ml       Significant Imaging:  All pertinent imaging results/findings from the past 24 hours have been reviewed.

## 2017-12-26 NOTE — PLAN OF CARE
Problem: Occupational Therapy Goal  Goal: Occupational Therapy Goal  Goals to be met by: 1/5.18     Patient will increase functional independence with ADLs by performing:    Feeding with Contact Guard Assistance.  UE Dressing with Minimal Assistance.  Grooming while seated with Supervision and Stand-by Assistance.  Upper extremity exercise program x10 reps, with assistance as needed.     Outcome: Ongoing (interventions implemented as appropriate)  Patient limited in therapy today due to increased lethargy and unable to keep eyes open during treatment. Patient will benefit from continued skilled OT.

## 2017-12-26 NOTE — PROGRESS NOTES
Ortho Daily Progress Note    Oksana Estrada is a 87 y.o. female admitted on 12/20/2017      Chief Complaint/Reason for admission: Fall (twice this morning. Right knee dislocated, splint in place. 100mcg of Fentanyl given. )       Hospital Day: 6  Post Op Day: 5 Days Post-Op     The patient was seen and examined this morning at the bedside. Patient reports no acute issues overnight.  Patient reports that pain is adequately controlled.    _______________    Vitals:    12/25/17 2251 12/26/17 0337 12/26/17 0821 12/26/17 1140   BP: 130/63 (!) 141/76 (!) 147/64 (!) 140/60   Pulse: 61 89 64 64   Resp: 20 18 19 19   Temp: 98.7 °F (37.1 °C) 98.3 °F (36.8 °C) 98 °F (36.7 °C) 97.3 °F (36.3 °C)   TempSrc: Oral Oral Oral Oral   SpO2: 95% 96% 98% 97%   Weight:       Height:           Vital Signs (Most Recent)  Temp: 97.3 °F (36.3 °C) (12/26/17 1140)  Pulse: 64 (12/26/17 1140)  Resp: 19 (12/26/17 1140)  BP: (!) 140/60 (12/26/17 1140)  SpO2: 97 % (12/26/17 1140)    Vital Signs Range (Last 24H):  Temp:  [97.3 °F (36.3 °C)-98.9 °F (37.2 °C)]   Pulse:  [61-89]   Resp:  [17-20]   BP: (125-147)/(58-76)   SpO2:  [95 %-98 %]       Physical:    AAOx3  Incision/ dressing clean/dry/intact  NVI Distally  Palpable distal pulses  CR<3sec      Recent Labs      12/24/17   0418  12/25/17   0456  12/26/17   0458   K  3.1*  3.0*  3.1*   MG   --    --   1.8   CALCIUM  8.2*  8.5*  8.7   WBC  7.87  8.14   --    HGB  7.7*  9.3*   --    HCT  22.4*  27.8*   --    PLT  208  238   --        I/O last 3 completed shifts:  In: 1028.3 [P.O.:445; Blood:583.3]  Out: 2100 [Urine:2100]          Assessment:  A/P s/p right distal femur ORIF              Plan:    PT/OT  Pain Control  DVT Prophylaxis    Discharge Planning        Jordan Ferrera MD  [facility]

## 2017-12-26 NOTE — PT/OT/SLP PROGRESS
Physical Therapy Treatment    Patient Name:  Oksana Estrada   MRN:  [id number]    Recommendations:     Discharge Recommendations:    nursing facility, skilled   Discharge Equipment Recommendations:   NONE   Barriers to discharge: Decreased caregiver support    Assessment:     Oksana Estrada is a 87 y.o. female admitted with a medical diagnosis of Closed fracture of distal end of right femur.  She presents with the following impairments/functional limitations:  weakness, impaired endurance, impaired self care skills, impaired functional mobilty, gait instability, impaired balance, decreased coordination, impaired cognition, decreased upper extremity function, decreased lower extremity function, pain, decreased safety awareness, decreased ROM, edema, orthopedic precautions, impaired muscle length. Limited with transfer today 2* to [facility], pt required constant V/T cues to stay awake during therapy activity, nurse Kowalski notified.    Rehab Prognosis:  fair; patient would benefit from acute skilled PT services to address these deficits and reach maximum level of function.      Recent Surgery: Procedure(s) (LRB):  OPEN REDUCTION INTERNAL FIXATION-HIP (Right) 5 Days Post-Op    Plan:     During this hospitalization, patient to be seen daily to address the above listed problems via gait training, therapeutic activities, therapeutic exercises  · Plan of Care Expires:  01/19/18   Plan of Care Reviewed with: patient    Subjective     Communicated with nurse Kowalski  prior to session.  Patient found in L sidelying upon PT entry to room, agreeable to treatment.      Chief Complaint: sleepy   Patient comments/goals: to sleep   Pain/Comfort:  · Location - Side 1: Right  · Location 1: hip  · Pain Addressed 1: Pre-medicate for activity, Reposition, Cessation of Activity, Nurse notified    Patients cultural, spiritual, Taoist conflicts given the current situation: none    Objective:     Patient found with:  telemetry, bed alarm, SCD, pressure relief boots     General Precautions: Standard, fall   Orthopedic Precautions:Full weight bearing   Braces: N/A     Functional Mobility:  · Bed Mobility:     · Rolling Left:  moderate assistance  · Rolling Right: moderate assistance and maximal assistance  · Scooting: maximal assistance  · Bridging: total assistance and of 2 persons  · Supine to Sit: moderate assistance and of 2 persons  · Sit to Supine: total assistance and of 2 persons  · Balance: poor, pt required mod/ max A for static and dynamic sitting balance EOB. Noted with R lateral leaning upon sitting. Pt required frequent V/T cues to stay awake during therapy activity .        AM-PAC 6 CLICK MOBILITY  Turning over in bed (including adjusting bedclothes, sheets and blankets)?: 2  Sitting down on and standing up from a chair with arms (e.g., wheelchair, bedside commode, etc.): 2  Moving from lying on back to sitting on the side of the bed?: 2  Moving to and from a bed to a chair (including a wheelchair)?: 2  Need to walk in hospital room?: 2  Climbing 3-5 steps with a railing?: 1  Total Score: 11       Therapeutic Activities and Exercises:   pt performed seated BLE ( AAROM RLE )  x 10 reps : AP, LAQ, HS, isometric hip ABD/ADD. V/T cues for proper technique and sequence.      Patient left right sidelying SCD, pressure relief boots placed  with all lines intact, call button in reach, bed alarm on and nurse Meghana notified..    GOALS:    Physical Therapy Goals        Problem: Physical Therapy Goal    Goal Priority Disciplines Outcome Goal Variances Interventions   Physical Therapy Goal     PT/OT, PT Ongoing (interventions implemented as appropriate)     Description:  Goals to be met by: 2017     Patient will increase functional independence with mobility by performin. Supine to sit with Stand-by Assistance  2. Sit to stand transfer with Minimal Assistance  3. Bed to chair transfer with Minimal Assistance   4.  Gait  To be assessed .   5. Sitting at edge of bed x15 minutes with Supervision  6. Lower extremity exercise program x10 reps per handout, with assistance as needed                    Time Tracking:     PT Received On: 12/26/17  PT Start Time: 1100     PT Stop Time: 1123  PT Total Time (min): 23 min     Billable Minutes: Therapeutic Activity 11 and Total Time 23( co-treat with OT)     Treatment Type: Treatment  PT/PTA: PTA     PTA Visit Number: 3     Yuliana Billingsh, PTA  12/26/2017

## 2017-12-26 NOTE — PLAN OF CARE
Problem: Physical Therapy Goal  Goal: Physical Therapy Goal  Goals to be met by: 2017     Patient will increase functional independence with mobility by performin. Supine to sit with Stand-by Assistance  2. Sit to stand transfer with Minimal Assistance  3. Bed to chair transfer with Minimal Assistance   4. Gait  To be assessed .   5. Sitting at edge of bed x15 minutes with Supervision  6. Lower extremity exercise program x10 reps per handout, with assistance as needed   Outcome: Ongoing (interventions implemented as appropriate)  Pt will benefit from further skilled therapy in order to get back to PLOF.

## 2017-12-26 NOTE — PROGRESS NOTES
[facility] Hospital Medicine  Progress Note    Patient Name: Oksana Estrada  MRN: [id number]  Patient Class: IP- Inpatient   Admission Date: 12/20/2017  Length of Stay: 6 days  Attending Physician: Carmela Ye MD  Primary Care Provider: Red Griffith MD        Subjective:     Principal Problem:Closed fracture of distal end of right femur    HPI:  Mrs. Oksana Estrada is a 87 y.o. female with essential hypertension, hyperlipidemia (.0 May 2018), anemia of chronic disease, dementia, history of CVA, and history of subdural hematoma who presents to [facility] ED with complaints of fall today.  She had some right leg pain after a fall today while she was trying to get out of bed to use the restroom.  EMS was activated and she was given fentanyl for pain and transported to this facility.  Further history is limited at this time.    Hospital Course:  X ray showed acute comminuted fracture distal right femur just above the knee arthroplasty. She was taken for ORIF right hip on 12.21.17. PT/OT consulted. She has dementia and briefly required restraints. SNF placement pending. She had acute blood loss anemia as to be expected with surgery and was transfused RBCs. She had urinary retention requiring a Tidwell - voiding trial 12/26 passed with adequate voiding.    Interval History: no acute events     Review of Systems   Constitutional: Negative for chills and fever.   HENT: Negative for congestion and rhinorrhea.    Eyes: Negative for photophobia and visual disturbance.   Respiratory: Negative for cough and shortness of breath.    Cardiovascular: Negative for chest pain and leg swelling.   Gastrointestinal: Negative for abdominal pain, nausea and vomiting.   Genitourinary: Negative for difficulty urinating.   Psychiatric/Behavioral: Positive for confusion. Negative for agitation.     Objective:     Vital Signs (Most Recent):  Temp: 97.5 °F (36.4 °C) (12/26/17 1529)  Pulse: 70 (12/26/17  "1529)  Resp: 18 (17 1529)  BP: (!) 148/67 (17 1529)  SpO2: 96 % (17 1529) Vital Signs (24h Range):  Temp:  [97.3 °F (36.3 °C)-98.9 °F (37.2 °C)] 97.5 °F (36.4 °C)  Pulse:  [61-89] 70  Resp:  [17-20] 18  SpO2:  [95 %-98 %] 96 %  BP: (125-148)/(58-76) 148/67     Weight: 69.3 kg (152 lb 12.8 oz)  Body mass index is 25.43 kg/m².    Intake/Output Summary (Last 24 hours) at 17 1536  Last data filed at 17 0821   Gross per 24 hour   Intake              180 ml   Output             1475 ml   Net            -1295 ml      Physical Exam   Constitutional: She appears well-developed and well-nourished. No distress.   HENT:   Right Ear: External ear normal.   Left Ear: External ear normal.   Nose: Nose normal.   Mouth/Throat: Oropharynx is clear and moist.   Eyes: EOM are normal. Pupils are equal, round, and reactive to light. No scleral icterus.   Neck: Normal range of motion. Neck supple. No thyromegaly present.   Cardiovascular: Normal rate and normal heart sounds.  Exam reveals no friction rub.    No murmur heard.  Pulmonary/Chest: Effort normal and breath sounds normal. No respiratory distress. She has no wheezes. She has no rales.   Abdominal: Soft. Bowel sounds are normal. She exhibits no mass. There is no tenderness.   Musculoskeletal: She exhibits no edema or deformity.   Neurological: She is alert. No cranial nerve deficit.   Oriented to person, place,    Skin: Skin is warm and dry. No pallor.   Surgical site bandaged, c/d/i       Significant Labs: All pertinent labs within the past 24 hours have been reviewed.      Assessment/Plan:      * Closed fracture of distal end of right femur    Patient had a fall and has sustained a fracture.  Plain radiograph was significant for "[a]cute comminuted fracture distal right femur just above the knee arthroplasty."  S/P ORIF right femur on 17. Pain control. PT/OT.  SNF.        Dementia without behavioral disturbance    Stable; will continue her " home regimen of donepezil and seroquel.        Acute cystitis without hematuria    UCx with E coli >100cfu. Will treat with cipro. End date 1/1/2018 pm dose.        Anemia of chronic disease    Chronic. See above.        Hyperlipidemia    Poorly-controlled; will continue patient's home regimen of atorvastatin.        Essential hypertension    Better controlled; continue home regimen of chlorthalidone and lisinopril, and provide as-needed clonidine.        History of CVA (cerebrovascular accident)    Stable; will continue her home regimen of atorvastatin.        History of subdural hematoma    Stable; there are no acute issues.          VTE Risk Mitigation         Ordered     enoxaparin injection 40 mg  Daily     Route:  Subcutaneous        12/22/17 0852     Medium Risk of VTE  Once      12/20/17 2203     Place sequential compression device  Until discontinued      12/20/17 1758     Place VENUS hose  Until discontinued      12/20/17 1758              Carmela Ye MD  Department of Hospital Medicine   [facility]

## 2017-12-26 NOTE — PLAN OF CARE
[facility]  [address] [place] [id number]    Facility Transfer Orders                        12/28/2017    Admit to: SNF    Diagnoses:  Active Hospital Problems    Diagnosis  POA    *Closed fracture of distal end of right femur [S72.401A]  Yes     Priority: 1 - High    Dementia without behavioral disturbance [F03.90]  Yes     Priority: 2      Chronic    Acute cystitis without hematuria [N30.00]  Yes     Priority: 3     Essential hypertension [I10]  Yes     Chronic    Hyperlipidemia [E78.5]  Yes     Chronic    Anemia of chronic disease [D63.8]  Yes     Chronic    History of CVA (cerebrovascular accident) [Z86.73]  Not Applicable     Chronic    History of subdural hematoma [Z86.79]  Not Applicable     Chronic      Resolved Hospital Problems    Diagnosis Date Resolved POA    Urinary retention [R33.9] 12/26/2017 No     Priority: 4     Acute blood loss anemia [D62] 12/26/2017 No     Priority: 5        Allergies:  Review of patient's allergies indicates:   Allergen Reactions    Codeine     Penicillins        Vitals:  Every shift (Skilled Nursing patients)    Diet: Pureed, cardiac diet    Activity:    - Up in a chair each morning as tolerated   - Ambulate with assistance to bathroom     Nursing Precautions:     - Aspiration precautions:             - Total assistance with meals            -  Upright 90 degrees befor during and after meals    - Fall precautions   - Seizure precaution   - Decubitus precautions:        -  for positioning   - Pressure reducing foam mattress   - Turn patient every two hours. Use wedge pillows to anchor patient      CONSULTS:     PT to evaluate and treat - five times a week     OT to evaluate and treat - five times a week      Medications: Discontinue all previous medication orders, if any. See new list below.     Oksana Estrada   Home Medication Instructions DENZEL:[id number]    Printed on:12/26/17 1543   Medication Information                       alendronate (FOSAMAX) 70 MG tablet  Take 1 tablet (70 mg total) by mouth every 7 days.             atorvastatin (LIPITOR) 20 MG tablet  Take 1 tablet (20 mg total) by mouth once daily.             chlordiazepoxide-clidinium 5-2.5 mg (LIBRAX) 5-2.5 mg Cap  Take 1 capsule by mouth 3 (three) times daily as needed. For irritable bowel syndrome             chlorthalidone (HYGROTEN) 25 MG Tab  Take 25 mg by mouth daily.             cholestyramine-aspartame (QUESTRAN LIGHT) 4 gram PwPk  Take 1 packet (4 g total) by mouth 2 (two) times daily. As needed for diarrhea             ciprofloxacin HCl (CIPRO) 500 MG tablet  Take 1 tablet (500 mg total) by mouth every 12 (twelve) hours.  Last dose 1/1/2018 PM dose.           donepezil (ARICEPT) 5 MG tablet  Take 1 tablet (5 mg total) by mouth every evening. For memory             escitalopram oxalate (LEXAPRO) 20 MG tablet  Take 1 tablet (20 mg total) by mouth once daily.             lisinopril (PRINIVIL,ZESTRIL) 20 MG tablet  Take 1 tablet (20 mg total) by mouth once daily.             quetiapine (SEROQUEL) 25 MG Tab  Take 1 tablet (25 mg total) by mouth every evening.             terbinafine HCl (LAMISIL) 250 mg tablet  Take 250 mg by mouth once daily.             tolterodine (DETROL) 1 MG Tab  Take 1 tablet (1 mg total) by mouth 2 (two) times daily.                       _________________________________  Carmela Ye MD  12/26/2017

## 2017-12-26 NOTE — PLAN OF CARE
ALMA contacted Lucille @ [phone] to follow-up on SNF referral, ALMA spoke to Juliet (admissions coordinator) that reported no bed availability until Friday. ALMA contacted patient's son Michele @ [phone] to provide update on placement and to discuss more preferences, SW left message on voicemail, waiting on return phone call. Hortencia (admissions coordinator) with [facility] contacted ALMA to report she received referral and nursing reviewing clinicals. ALMA contacted attending MD to request order for PPD and SNF consult.         12/26/17 7274   Discharge Reassessment   Assessment Type Discharge Planning Reassessment   Provided patient/caregiver education on the expected discharge date and the discharge plan No   Do you have any problems affording any of your prescribed medications? No   Discharge Plan A Skilled Nursing Facility   Discharge Plan B Home;Home Health   Patient choice form signed by patient/caregiver Yes   Can the patient answer the patient profile reliably? Yes, cognitively intact   How does the patient rate their overall health at the present time? Fair   Describe the patient's ability to walk at the present time. Major restrictions/daily assistance from another person   How often would a person be available to care for the patient? Infrequently

## 2017-12-26 NOTE — ASSESSMENT & PLAN NOTE
Urine culture ->100k E coli. Abx per primary.  Voiding trial ordered, lara still in place. D/c lara now. Monitor PVRs.

## 2017-12-26 NOTE — SUBJECTIVE & OBJECTIVE
Interval History: no acute events     Review of Systems   Constitutional: Negative for chills and fever.   HENT: Negative for congestion and rhinorrhea.    Eyes: Negative for photophobia and visual disturbance.   Respiratory: Negative for cough and shortness of breath.    Cardiovascular: Negative for chest pain and leg swelling.   Gastrointestinal: Negative for abdominal pain, nausea and vomiting.   Genitourinary: Negative for difficulty urinating.   Psychiatric/Behavioral: Positive for confusion. Negative for agitation.     Objective:     Vital Signs (Most Recent):  Temp: 97.5 °F (36.4 °C) (12/26/17 1529)  Pulse: 70 (12/26/17 1529)  Resp: 18 (12/26/17 1529)  BP: (!) 148/67 (12/26/17 1529)  SpO2: 96 % (12/26/17 1529) Vital Signs (24h Range):  Temp:  [97.3 °F (36.3 °C)-98.9 °F (37.2 °C)] 97.5 °F (36.4 °C)  Pulse:  [61-89] 70  Resp:  [17-20] 18  SpO2:  [95 %-98 %] 96 %  BP: (125-148)/(58-76) 148/67     Weight: 69.3 kg (152 lb 12.8 oz)  Body mass index is 25.43 kg/m².    Intake/Output Summary (Last 24 hours) at 12/26/17 1536  Last data filed at 12/26/17 0821   Gross per 24 hour   Intake              180 ml   Output             1475 ml   Net            -1295 ml      Physical Exam   Constitutional: She appears well-developed and well-nourished. No distress.   HENT:   Right Ear: External ear normal.   Left Ear: External ear normal.   Nose: Nose normal.   Mouth/Throat: Oropharynx is clear and moist.   Eyes: EOM are normal. Pupils are equal, round, and reactive to light. No scleral icterus.   Neck: Normal range of motion. Neck supple. No thyromegaly present.   Cardiovascular: Normal rate and normal heart sounds.  Exam reveals no friction rub.    No murmur heard.  Pulmonary/Chest: Effort normal and breath sounds normal. No respiratory distress. She has no wheezes. She has no rales.   Abdominal: Soft. Bowel sounds are normal. She exhibits no mass. There is no tenderness.   Musculoskeletal: She exhibits no edema or deformity.    Neurological: She is alert. No cranial nerve deficit.   Oriented to person, place,    Skin: Skin is warm and dry. No pallor.   Surgical site bandaged, c/d/i       Significant Labs: All pertinent labs within the past 24 hours have been reviewed.

## 2017-12-26 NOTE — PLAN OF CARE
Problem: Patient Care Overview  Goal: Plan of Care Review  Outcome: Ongoing (interventions implemented as appropriate)   12/25/17 0953   Coping/Psychosocial   Plan Of Care Reviewed With patient   Pt free from falls, injury, and trauma. VSS. Scheduled meds given.  Dressing CDI. Denies any pain and no NV. No acute distress noted. Will continue to monitor.

## 2017-12-26 NOTE — PLAN OF CARE
Problem: Fall Risk (Adult)  Goal: Identify Related Risk Factors and Signs and Symptoms  Related risk factors and signs and symptoms are identified upon initiation of Human Response Clinical Practice Guideline (CPG)   Outcome: Ongoing (interventions implemented as appropriate)   12/26/17 1530   Fall Risk   Related Risk Factors (Fall Risk) age-related changes;fatigue/slow reaction;gait/mobility problems;polypharmacy;sensory deficits   Signs and Symptoms (Fall Risk) presence of risk factors   No injuries or falls noted. Remains in bed this shift. Turned every 2 hours.    Problem: Infection, Risk/Actual (Adult)  Goal: Identify Related Risk Factors and Signs and Symptoms  Related risk factors and signs and symptoms are identified upon initiation of Human Response Clinical Practice Guideline (CPG)   Outcome: Ongoing (interventions implemented as appropriate)   12/26/17 1530   Infection, Risk/Actual   Related Risk Factors (Infection, Risk/Actual) chronic illness/condition;skin integrity impairment;surgery/procedure;trauma injury   Signs and Symptoms (Infection, Risk/Actual) pain;weakness    afebrile.No signs of infection noted.    Problem: Patient Care Overview  Goal: Plan of Care Review  Outcome: Ongoing (interventions implemented as appropriate)   12/26/17 1530   Coping/Psychosocial   Plan Of Care Reviewed With patient   Awaiting placement to SNF. Totally dependent on staff to meet adls    Problem: Pressure Ulcer Risk (Talha Scale) (Adult,Obstetrics,Pediatric)  Goal: Identify Related Risk Factors and Signs and Symptoms  Related risk factors and signs and symptoms are identified upon initiation of Human Response Clinical Practice Guideline (CPG)   Outcome: Ongoing (interventions implemented as appropriate)   12/26/17 1530   Pressure Ulcer Risk (Talha Scale)   Related Risk Factors (Pressure Ulcer Risk (Talha Scale)) age extremes;hospitalization prolonged   Turned every 2 hours and prn    Problem: Fracture Orthopaedic  (Adult)  Goal: Signs and Symptoms of Listed Potential Problems Will be Absent, Minimized or Managed (Fracture Orthopaedic)  Signs and symptoms of listed potential problems will be absent, minimized or managed by discharge/transition of care (reference Fracture Orthopaedic (Adult) CPG).   Outcome: Ongoing (interventions implemented as appropriate)   12/26/17 1530   Fracture Orthopaedic   Problems Assessed (Orthopaedic Fracture) all   Urinary retention resolved. No complains of pain at this time.    Problem: Confusion, Chronic (Adult)  Goal: Identify Related Risk Factors and Signs and Symptoms  Related risk factors and signs and symptoms are identified upon initiation of Human Response Clinical Practice Guideline (CPG)   Outcome: Ongoing (interventions implemented as appropriate)   12/26/17 1530   Confusion, Chronic   Related Risk Factors (Chronic Confusion) aging effects;medication effects;dementia   Signs and Symptoms (Chronic Confusion) disorientation   Follows simple command. Disorientation situation,place and time

## 2017-12-26 NOTE — PROGRESS NOTES
[facility]  Urology  Progress Note    Patient Name: Oksana Estrada  MRN: [id number]  Admission Date: 12/20/2017  Hospital Length of Stay: 6 days  Code Status: Full Code   Attending Provider: Carmela Ye MD   Primary Care Physician: Red Griffith MD    Subjective:     HPI:  Urinary Retention  Patient complains of urinary retention. Onset of retention was 1 day ago and was sudden in onset. Patient currently does have a urinary catheter in place.  650 ml of urine were drained when catheter was placed. Prior to this event voiding symptoms consisted of nocturia. Prior treatments include none. Recent medications that may have affected her voiding include general anesthesia.        Interval History: Void trial ordered for this AM. Tidwell remains in place now.     Review of Systems   Unable to perform ROS: Dementia     Objective:     Temp:  [98 °F (36.7 °C)-98.9 °F (37.2 °C)] 98 °F (36.7 °C)  Pulse:  [61-89] 64  Resp:  [17-20] 19  SpO2:  [95 %-98 %] 98 %  BP: (125-147)/(58-76) 147/64     Body mass index is 25.43 kg/m².            Drains     Drain                 Urethral Catheter 12/23/17 1204 2 days                Physical Exam   Vitals reviewed.  Constitutional: She appears well-developed and well-nourished. No distress.   HENT:   Head: Normocephalic and atraumatic.   Neck: Normal range of motion.   Cardiovascular: Normal rate and regular rhythm.    Pulmonary/Chest: Effort normal and breath sounds normal. No respiratory distress.   Abdominal: Soft. She exhibits no distension and no mass. There is no tenderness. There is no rebound and no guarding.   Genitourinary:   Genitourinary Comments: Tidwell in place   Musculoskeletal: Normal range of motion.   Skin: Skin is warm and dry. No rash noted. She is not diaphoretic. No erythema.         Significant Labs:    BMP:    Recent Labs  Lab 12/24/17  0418 12/25/17  0456 12/26/17  0458   * 137 139   K 3.1* 3.0* 3.1*    102 102   CO2 24 24  29   BUN 23 26* 25*   CREATININE 0.9 0.9 0.8   CALCIUM 8.2* 8.5* 8.7       CBC:     Recent Labs  Lab 12/23/17  0452 12/24/17  0418 12/25/17  0456   WBC 12.24 7.87 8.14   HGB 8.5* 7.7* 9.3*   HCT 23.8* 22.4* 27.8*    208 238       Urine Culture:   Recent Labs  Lab 12/20/17  1515 12/24/17  1612   LABURIN No growth ESCHERICHIA COLI> 100,000 cfu/ml       Significant Imaging:  All pertinent imaging results/findings from the past 24 hours have been reviewed.                  Assessment/Plan:     Acute cystitis without hematuria     - >100k E coli   - Abx per primary        Urinary retention    Urine culture ->100k E coli. Abx per primary.  Voiding trial ordered, lara still in place. D/c lara now. Monitor PVRs.            VTE Risk Mitigation         Ordered     enoxaparin injection 40 mg  Daily     Route:  Subcutaneous        12/22/17 0852     Medium Risk of VTE  Once      12/20/17 2203     Place sequential compression device  Until discontinued      12/20/17 1758     Place VENUS hose  Until discontinued      12/20/17 1758          Keara Aguilar MD  Urology  [facility]

## 2017-12-26 NOTE — PT/OT/SLP PROGRESS
Occupational Therapy   Treatment    Name: Oksana Estrada  MRN: [id number]  Admitting Diagnosis:  Closed fracture of distal end of right femur  5 Days Post-Op    Recommendations:     Discharge Recommendations: nursing facility, skilled  Discharge Equipment Recommendations:  none  Barriers to discharge:  Decreased caregiver support    Subjective     Communicated with: Nurse Kowalski prior to session.  Pain/Comfort:  · Location - Side 1: Right  · Location 1: hip  · Pain Addressed 1: Pre-medicate for activity, Cessation of Activity, Reposition, Nurse notified    Objective:     Patient found with: bed alarm, telemetry, SCD, pressure relief boots    General Precautions: Standard, fall   Orthopedic Precautions:Full weight bearing   Braces: N/A     Occupational Performance:    Bed Mobility:    · Patient completed Rolling/Turning to Left with  moderate assistance  · Patient completed Rolling/Turning to Right with moderate assistance  · Patient completed Scooting/Bridging with maximal assistance, total assistance and 2 persons  · Patient completed Supine to Sit with moderate assistance and 2 persons  · Patient completed Sit to Supine with total assistance and 2 persons     Activities of Daily Living:  · Grooming: minimum assistance to wash face with washcloth; verbal/tactile cues for initiation of task     Patient left right sidelying SCD and pressure relief boots on with all lines intact, bed alarm on and nurse notified    [facility] 6 Click:  [facility] Total Score:  8    Treatment & Education:  Patient performed bed mobility as stated above.  Patient needed mod- max A while seated EOB for therex with R lateral leaning noted.  Patient performed BUE AAROM x 10 reps shoulder flex/ext, AROM elbow flex/ext, and shoulder shrugs.   Education:    Assessment:     Oksana Estrada is a 87 y.o. female with a medical diagnosis of Closed fracture of distal end of right femur.  She presents with the following performance deficits affecting  function: weakness, impaired endurance, impaired self care skills, impaired functional mobilty, gait instability, impaired balance, impaired cognition, decreased coordination, decreased upper extremity function, decreased lower extremity function, decreased safety awareness, decreased ROM, edema, orthopedic precautions.  Patient limited in therapy today due to increased lethargy and unable to keep eyes open during treatment.    Rehab Prognosis:  Fair; patient would benefit from acute skilled OT services to address these deficits and reach maximum level of function.       Plan:     Patient to be seen 4 x/week to address the above listed problems via self-care/home management, therapeutic activities, therapeutic exercises  · Plan of Care Expires: 01/05/18  · Plan of Care Reviewed with: patient    This Plan of care has been discussed with the patient who was involved in its development and understands and is in agreement with the identified goals and treatment plan    GOALS:    Occupational Therapy Goals        Problem: Occupational Therapy Goal    Goal Priority Disciplines Outcome Interventions   Occupational Therapy Goal     OT, PT/OT Ongoing (interventions implemented as appropriate)    Description:  Goals to be met by: 1/5.18     Patient will increase functional independence with ADLs by performing:    Feeding with Contact Guard Assistance.  UE Dressing with Minimal Assistance.  Grooming while seated with Supervision and Stand-by Assistance.  Upper extremity exercise program x10 reps, with assistance as needed.                      Time Tracking:     OT Date of Treatment: 12/26/17  OT Start Time: 1100  OT Stop Time: 1123  OT Total Time (min): 23 min    Billable Minutes:Therapeutic Exercise 12 (co tx with PT)    PONCE Lewis  12/26/2017

## 2017-12-27 LAB
ANION GAP SERPL CALC-SCNC: 8 MMOL/L
BUN SERPL-MCNC: 26 MG/DL
CALCIUM SERPL-MCNC: 9.3 MG/DL
CHLORIDE SERPL-SCNC: 102 MMOL/L
CO2 SERPL-SCNC: 28 MMOL/L
CREAT SERPL-MCNC: 0.9 MG/DL
EST. GFR  (AFRICAN AMERICAN): >60 ML/MIN/1.73 M^2
EST. GFR  (NON AFRICAN AMERICAN): 58 ML/MIN/1.73 M^2
GLUCOSE SERPL-MCNC: 111 MG/DL
MAGNESIUM SERPL-MCNC: 1.6 MG/DL
POTASSIUM SERPL-SCNC: 3.9 MMOL/L
SODIUM SERPL-SCNC: 138 MMOL/L

## 2017-12-27 PROCEDURE — 99232 SBSQ HOSP IP/OBS MODERATE 35: CPT | Mod: ,,, | Performed by: UROLOGY

## 2017-12-27 PROCEDURE — 97530 THERAPEUTIC ACTIVITIES: CPT

## 2017-12-27 PROCEDURE — 12000002 HC ACUTE/MED SURGE SEMI-PRIVATE ROOM

## 2017-12-27 PROCEDURE — 25000003 PHARM REV CODE 250: Performed by: INTERNAL MEDICINE

## 2017-12-27 PROCEDURE — 63600175 PHARM REV CODE 636 W HCPCS: Performed by: HOSPITALIST

## 2017-12-27 PROCEDURE — 25000003 PHARM REV CODE 250: Performed by: EMERGENCY MEDICINE

## 2017-12-27 PROCEDURE — 25000003 PHARM REV CODE 250: Performed by: ORTHOPAEDIC SURGERY

## 2017-12-27 PROCEDURE — 83735 ASSAY OF MAGNESIUM: CPT

## 2017-12-27 PROCEDURE — 80048 BASIC METABOLIC PNL TOTAL CA: CPT

## 2017-12-27 PROCEDURE — 36415 COLL VENOUS BLD VENIPUNCTURE: CPT

## 2017-12-27 PROCEDURE — 97110 THERAPEUTIC EXERCISES: CPT

## 2017-12-27 RX ADMIN — CIPROFLOXACIN HYDROCHLORIDE 500 MG: 500 TABLET, FILM COATED ORAL at 08:12

## 2017-12-27 RX ADMIN — ENOXAPARIN SODIUM 40 MG: 100 INJECTION SUBCUTANEOUS at 05:12

## 2017-12-27 RX ADMIN — DOCUSATE SODIUM AND SENNOSIDES 1 TABLET: 8.6; 5 TABLET, FILM COATED ORAL at 08:12

## 2017-12-27 RX ADMIN — LISINOPRIL 20 MG: 20 TABLET ORAL at 08:12

## 2017-12-27 RX ADMIN — DONEPEZIL HYDROCHLORIDE 5 MG: 5 TABLET, FILM COATED ORAL at 08:12

## 2017-12-27 RX ADMIN — FAMOTIDINE 20 MG: 20 TABLET, FILM COATED ORAL at 08:12

## 2017-12-27 RX ADMIN — CHLORTHALIDONE 25 MG: 25 TABLET ORAL at 08:12

## 2017-12-27 RX ADMIN — ATORVASTATIN CALCIUM 20 MG: 10 TABLET, FILM COATED ORAL at 08:12

## 2017-12-27 RX ADMIN — QUETIAPINE FUMARATE 25 MG: 25 TABLET, FILM COATED ORAL at 08:12

## 2017-12-27 NOTE — PLAN OF CARE
ALMA contacted Michele (son) @ [phone] to explain IMM Notice, Michele verbalized understanding. SW reported copy of IMM Notice will be left at patient bedside for his records.         12/27/17 1454   Medicare Message   Important Message from Medicare regarding Discharge Appeal Rights Explained to patient/caregiver;Signed/date by patient/caregiver

## 2017-12-27 NOTE — PROGRESS NOTES
[facility] Hospital Medicine  Progress Note    Patient Name: Oksana Estrada  MRN: [id number]  Patient Class: IP- Inpatient   Admission Date: 12/20/2017  Length of Stay: 7 days  Attending Physician: Carmela Ye MD  Primary Care Provider: Red Griffith MD        Subjective:     Principal Problem:Closed fracture of distal end of right femur    HPI:  Mrs. Oksana Estrada is a 87 y.o. female with essential hypertension, hyperlipidemia (.0 May 2018), anemia of chronic disease, dementia, history of CVA, and history of subdural hematoma who presents to [facility] ED with complaints of fall today.  She had some right leg pain after a fall today while she was trying to get out of bed to use the restroom.  EMS was activated and she was given fentanyl for pain and transported to this facility.  Further history is limited at this time.    Hospital Course:  X ray showed acute comminuted fracture distal right femur just above the knee arthroplasty. She was taken for ORIF right hip on 12.21.17. PT/OT consulted. She has dementia and briefly required restraints. SNF placement pending. She had acute blood loss anemia as to be expected with surgery and was transfused RBCs. She had urinary retention requiring a Tidwell - voiding trial 12/26 passed with adequate voiding.  SW working to arrange SNF. Family did not want her to go to accepting facility, [facility], due to rating.     Interval History: no acute events     Review of Systems   Constitutional: Negative for chills and fever.   HENT: Negative for congestion and rhinorrhea.    Eyes: Negative for photophobia and visual disturbance.   Respiratory: Negative for cough and shortness of breath.    Cardiovascular: Negative for chest pain and leg swelling.   Gastrointestinal: Negative for abdominal pain, nausea and vomiting.   Genitourinary: Negative for difficulty urinating.   Psychiatric/Behavioral: Positive for confusion. Negative for agitation.  "    Objective:     Vital Signs (Most Recent):  Temp: 98.9 °F (37.2 °C) (17 1141)  Pulse: 68 (17 1141)  Resp: 19 (17 1141)  BP: (!) 130/59 (17 1141)  SpO2: 97 % (17 1141) Vital Signs (24h Range):  Temp:  [96.6 °F (35.9 °C)-98.9 °F (37.2 °C)] 98.9 °F (37.2 °C)  Pulse:  [66-87] 68  Resp:  [18-20] 19  SpO2:  [95 %-98 %] 97 %  BP: (130-146)/(59-74) 130/59     Weight: 69.3 kg (152 lb 12.8 oz)  Body mass index is 25.43 kg/m².    Intake/Output Summary (Last 24 hours) at 17 1540  Last data filed at 17 0500   Gross per 24 hour   Intake              480 ml   Output                0 ml   Net              480 ml      Physical Exam   Constitutional: She appears well-developed and well-nourished. No distress.   HENT:   Right Ear: External ear normal.   Left Ear: External ear normal.   Nose: Nose normal.   Mouth/Throat: Oropharynx is clear and moist.   Eyes: EOM are normal. Pupils are equal, round, and reactive to light. No scleral icterus.   Neck: Normal range of motion. Neck supple. No thyromegaly present.   Cardiovascular: Normal rate and normal heart sounds.  Exam reveals no friction rub.    No murmur heard.  Pulmonary/Chest: Effort normal and breath sounds normal. No respiratory distress. She has no wheezes. She has no rales.   Abdominal: Soft. Bowel sounds are normal. She exhibits no mass. There is no tenderness.   Musculoskeletal: She exhibits no edema or deformity.   Neurological: She is alert. No cranial nerve deficit.   Oriented to person, place,    Skin: Skin is warm and dry. No pallor.   Surgical site bandaged, c/d/i       Significant Labs: All pertinent labs within the past 24 hours have been reviewed.      Assessment/Plan:      * Closed fracture of distal end of right femur    Patient had a fall and has sustained a fracture.  Plain radiograph was significant for "[a]cute comminuted fracture distal right femur just above the knee arthroplasty."  S/P ORIF right femur on " 12.21.17. Pain control. PT/OT.  SNF.        Dementia without behavioral disturbance    Stable; will continue her home regimen of donepezil and seroquel.        Acute cystitis without hematuria    UCx with E coli >100cfu. Will treat with cipro. End date 1/1/2018 pm dose.        Anemia of chronic disease    Chronic. See above.        Hyperlipidemia    Poorly-controlled; will continue patient's home regimen of atorvastatin.        Essential hypertension    Better controlled; continue home regimen of chlorthalidone and lisinopril, and provide as-needed clonidine.        History of CVA (cerebrovascular accident)    Stable; will continue her home regimen of atorvastatin.        History of subdural hematoma    Stable; there are no acute issues.          VTE Risk Mitigation         Ordered     enoxaparin injection 40 mg  Daily     Route:  Subcutaneous        12/22/17 0852     Medium Risk of VTE  Once      12/20/17 2203     Place sequential compression device  Until discontinued      12/20/17 1758     Place VENUS hose  Until discontinued      12/20/17 1758              Carmela Ye MD  Department of Hospital Medicine   [facility]

## 2017-12-27 NOTE — PROGRESS NOTES
Progress Note  Orthopedics    Admit Date: 12/20/2017  Post-operative Day: 6 Days Post-Op  Hospital Day: 8    Follow-up For: Procedure(s) (LRB):  OPEN REDUCTION INTERNAL FIXATION-HIP (Right)    SUBJECTIVE:     Oksana Estrada is 87 y.o. and is now 6 days post surgery. Pain is controlled with current analgesics.. She  She is not ambulating.  Weight Bearing: NWB    NVI R LE.  Dressing R LE C/D/I.    OBJECTIVE:     Vital Signs (Most Recent)  Temp: 98.9 °F (37.2 °C) (12/27/17 1141)  Pulse: 68 (12/27/17 1141)  Resp: 19 (12/27/17 1141)  BP: (!) 130/59 (12/27/17 1141)  SpO2: 97 % (12/27/17 1141)      Physical Exam:  Dressing: clean, dry and intact  Incision: not examined  DVT Evaluation: No evidence of DVT seen on physical exam.  Neurovascular: 5/5 motor strength, sensation intact, palpable pulses    Laboratory:  Recent Results (from the past 24 hour(s))   Basic metabolic panel    Collection Time: 12/27/17  4:26 AM   Result Value Ref Range    Sodium 138 136 - 145 mmol/L    Potassium 3.9 3.5 - 5.1 mmol/L    Chloride 102 95 - 110 mmol/L    CO2 28 23 - 29 mmol/L    Glucose 111 (H) 70 - 110 mg/dL    BUN, Bld 26 (H) 8 - 23 mg/dL    Creatinine 0.9 0.5 - 1.4 mg/dL    Calcium 9.3 8.7 - 10.5 mg/dL    Anion Gap 8 8 - 16 mmol/L    eGFR if African American >60 >60 mL/min/1.73 m^2    eGFR if non African American 58 (A) >60 mL/min/1.73 m^2   Magnesium    Collection Time: 12/27/17  4:26 AM   Result Value Ref Range    Magnesium 1.6 1.6 - 2.6 mg/dL         ASSESSMENT/PLAN:     Assessment: orthopedic stable, condition improving  Status Post: R femur ORIF    Plan: con't with Rehab efforts.  Transfer to SNF when bed available OK from ortho standpoint.  Con't DVT prophy.  NWB R LE fro 6 weeks. D/c staples 2 weeks post op.

## 2017-12-27 NOTE — PLAN OF CARE
Problem: Occupational Therapy Goal  Goal: Occupational Therapy Goal  Goals to be met by: 1/5.18     Patient will increase functional independence with ADLs by performing:    Feeding with Contact Guard Assistance.  UE Dressing with Minimal Assistance.  Grooming while seated with Supervision and Stand-by Assistance.  Upper extremity exercise program x10 reps, with assistance as needed.     Outcome: Ongoing (interventions implemented as appropriate)  Patient will benefit from continued skilled OT for progression of treatment goals.

## 2017-12-27 NOTE — PT/OT/SLP PROGRESS
"Occupational Therapy   Treatment    Name: Oksana Estrada  MRN: [id number]  Admitting Diagnosis:  Closed fracture of distal end of right femur  6 Days Post-Op    Recommendations:     Discharge Recommendations: nursing facility, skilled  Discharge Equipment Recommendations:  none  Barriers to discharge:  Decreased caregiver support    Subjective     Communicated with: Nurse  prior to session.  Pain/Comfort:  · Pain Rating 1: unable to rate; patient stated "a little"     Objective:     Patient found with: bed alarm, telemetry, SCD, pressure relief boots    General Precautions: Standard, fall   Orthopedic Precautions:Full weight bearing   Braces: N/A     Occupational Performance:    Bed Mobility:    · Patient completed Scooting/Bridging with minimum assistance  · Patient completed Supine to Sit with moderate assistance  · Patient completed Sit to Supine with moderate assistance     Functional Mobility/Transfers:  · Patient completed Sit <> Stand Transfer with maximal assistance and of 2 persons  with  rolling walker  x3 trials  · Max verbal and tactile cues for technique and to manage walker    Activities of Daily Living:  · Grooming: stand by assistance to wash face with washcloth and comb hair  · UB Dressing: minimum assistance      Patient left HOB elevated with all lines intact, call button in reach, bed alarm on and nursing notified    AMPA 6 Click:  AMPA Total Score: 10    Treatment & Education:  Patient performed bed mobility and transfers as stated above. Patient tolerated sitting EOB with SBA/CGA due to lateral leaning.  Education:    Assessment:     Oksana Estrada is a 87 y.o. female with a medical diagnosis of Closed fracture of distal end of right femur.  She presents with the following performance deficits affecting function: weakness, impaired endurance, impaired self care skills, impaired functional mobilty, impaired balance, impaired cognition, decreased coordination, decreased upper " extremity function, decreased lower extremity function, decreased safety awareness, decreased ROM, edema, orthopedic precautions, gait instability.      Rehab Prognosis:  Fair; patient would benefit from acute skilled OT services to address these deficits and reach maximum level of function.       Plan:     Patient to be seen 4 x/week to address the above listed problems via self-care/home management, therapeutic activities, therapeutic exercises  · Plan of Care Expires: 01/05/18  · Plan of Care Reviewed with: patient    This Plan of care has been discussed with the patient who was involved in its development and understands and is in agreement with the identified goals and treatment plan    GOALS:    Occupational Therapy Goals        Problem: Occupational Therapy Goal    Goal Priority Disciplines Outcome Interventions   Occupational Therapy Goal     OT, PT/OT Ongoing (interventions implemented as appropriate)    Description:  Goals to be met by: 1/5.18     Patient will increase functional independence with ADLs by performing:    Feeding with Contact Guard Assistance.  UE Dressing with Minimal Assistance.  Grooming while seated with Supervision and Stand-by Assistance.  Upper extremity exercise program x10 reps, with assistance as needed.                      Time Tracking:     OT Date of Treatment: 12/27/17  OT Start Time: 1023  OT Stop Time: 1056  OT Total Time (min): 33 min    Billable Minutes:Therapeutic Activity 17 (co treat with PT)    PONCE Lewis  12/27/2017

## 2017-12-27 NOTE — PT/OT/SLP PROGRESS
"Physical Therapy Treatment    Patient Name:  Oksana Estrada   MRN:  [id number]    Recommendations:     Discharge Recommendations:  nursing facility, skilled   Discharge Equipment Recommendations: none   Barriers to discharge: Decreased caregiver support    Assessment:     Oksana Estrada is a 87 y.o. female admitted with a medical diagnosis of Closed fracture of distal end of right femur.  She presents with the following impairments/functional limitations:  weakness, impaired endurance, impaired self care skills, impaired functional mobilty, gait instability, impaired balance, decreased coordination, impaired cognition, decreased upper extremity function, decreased lower extremity function, decreased safety awareness, pain, decreased ROM, impaired coordination, impaired skin, orthopedic precautions Pt limited by decreased cognition and pain.    Rehab Prognosis:  Fair; patient would benefit from acute skilled PT services to address these deficits and reach maximum level of function.      Recent Surgery: Procedure(s) (LRB):  OPEN REDUCTION INTERNAL FIXATION-HIP (Right) 6 Days Post-Op    Plan:     During this hospitalization, patient to be seen daily to address the above listed problems via gait training, therapeutic activities, therapeutic exercises  · Plan of Care Expires:  01/19/18   Plan of Care Reviewed with: patient    Subjective     Communicated with nsg prior to session.  Patient found HOB elevated upon PT entry to room, agreeable to treatment.      Chief Complaint: pain  Patient comments/goals: to be able to walk  Pain/Comfort:  · Pain Rating 1:  (unable to rate, "A little")  · Pain Addressed 1: Reposition, Distraction, Cessation of Activity, Nurse notified    Patients cultural, spiritual, [employer]alism conflicts given the current situation: none    Objective:     Patient found with: bed alarm, telemetry, pressure relief boots, SCD     General Precautions: Standard, fall   Orthopedic Precautions:Full " weight bearing   Braces: N/A     Functional Mobility:  · Bed Mobility:     · Scooting: minimum assistance  · Bridging: minimum assistance  · Supine to Sit: moderate assistance  · Sit to Supine: moderate assistance  · Transfers:     · Sit to Stand:  x 3 trials with Max A x 2 people with rolling walker   · Fair sit balance, poor stand balance      AM-PAC 6 CLICK MOBILITY  Turning over in bed (including adjusting bedclothes, sheets and blankets)?: 2  Sitting down on and standing up from a chair with arms (e.g., wheelchair, bedside commode, etc.): 2  Moving from lying on back to sitting on the side of the bed?: 2  Moving to and from a bed to a chair (including a wheelchair)?: 2  Need to walk in hospital room?: 1  Climbing 3-5 steps with a railing?: 1  Total Score: 10       Therapeutic Activities and Exercises:   Pt performed B AP's x 10 reps in supine.  Dangle protocol.  Pt sat at EOB with SBA/CGA and VC/TC to correct Left lean.  Pt performed R HS's and FAQ'sx 10 reps sitting EOB.  3 standing trials as above, pt pushing posteriorly throughout.  Pt performed B AP's and QS/GS reclined in bed x 10 reps    Patient left HOB elevated with all lines intact, call button in reach, bed alarm on and nsg notified..    GOALS:    Physical Therapy Goals        Problem: Physical Therapy Goal    Goal Priority Disciplines Outcome Goal Variances Interventions   Physical Therapy Goal     PT/OT, PT Ongoing (interventions implemented as appropriate)     Description:  Goals to be met by: 2017     Patient will increase functional independence with mobility by performin. Supine to sit with Stand-by Assistance  2. Sit to stand transfer with Minimal Assistance  3. Bed to chair transfer with Minimal Assistance   4. Gait  To be assessed .   5. Sitting at edge of bed x15 minutes with Supervision  6. Lower extremity exercise program x10 reps per handout, with assistance as needed                    Time Tracking:     PT Received On:  12/27/17  PT Start Time: 1023     PT Stop Time: 1056  PT Total Time (min): 33 min     Billable Minutes: Therapeutic Exercise 16 co-treat with OT    Treatment Type: Treatment  PT/PTA: PT     PTA Visit Number: 0     Sandra العلي, PT  12/27/2017

## 2017-12-27 NOTE — PLAN OF CARE
Problem: Physical Therapy Goal  Goal: Physical Therapy Goal  Goals to be met by: 2017     Patient will increase functional independence with mobility by performin. Supine to sit with Stand-by Assistance  2. Sit to stand transfer with Minimal Assistance  3. Bed to chair transfer with Minimal Assistance   4. Gait  To be assessed .   5. Sitting at edge of bed x15 minutes with Supervision  6. Lower extremity exercise program x10 reps per handout, with assistance as needed   Outcome: Ongoing (interventions implemented as appropriate)  Pt making slow progress 2/2 decreased cognition and pain.  Remains appropriate for SNF

## 2017-12-27 NOTE — SUBJECTIVE & OBJECTIVE
Interval History: no acute events     Review of Systems   Constitutional: Negative for chills and fever.   HENT: Negative for congestion and rhinorrhea.    Eyes: Negative for photophobia and visual disturbance.   Respiratory: Negative for cough and shortness of breath.    Cardiovascular: Negative for chest pain and leg swelling.   Gastrointestinal: Negative for abdominal pain, nausea and vomiting.   Genitourinary: Negative for difficulty urinating.   Psychiatric/Behavioral: Positive for confusion. Negative for agitation.     Objective:     Vital Signs (Most Recent):  Temp: 98.9 °F (37.2 °C) (12/27/17 1141)  Pulse: 68 (12/27/17 1141)  Resp: 19 (12/27/17 1141)  BP: (!) 130/59 (12/27/17 1141)  SpO2: 97 % (12/27/17 1141) Vital Signs (24h Range):  Temp:  [96.6 °F (35.9 °C)-98.9 °F (37.2 °C)] 98.9 °F (37.2 °C)  Pulse:  [66-87] 68  Resp:  [18-20] 19  SpO2:  [95 %-98 %] 97 %  BP: (130-146)/(59-74) 130/59     Weight: 69.3 kg (152 lb 12.8 oz)  Body mass index is 25.43 kg/m².    Intake/Output Summary (Last 24 hours) at 12/27/17 1540  Last data filed at 12/27/17 0500   Gross per 24 hour   Intake              480 ml   Output                0 ml   Net              480 ml      Physical Exam   Constitutional: She appears well-developed and well-nourished. No distress.   HENT:   Right Ear: External ear normal.   Left Ear: External ear normal.   Nose: Nose normal.   Mouth/Throat: Oropharynx is clear and moist.   Eyes: EOM are normal. Pupils are equal, round, and reactive to light. No scleral icterus.   Neck: Normal range of motion. Neck supple. No thyromegaly present.   Cardiovascular: Normal rate and normal heart sounds.  Exam reveals no friction rub.    No murmur heard.  Pulmonary/Chest: Effort normal and breath sounds normal. No respiratory distress. She has no wheezes. She has no rales.   Abdominal: Soft. Bowel sounds are normal. She exhibits no mass. There is no tenderness.   Musculoskeletal: She exhibits no edema or deformity.    Neurological: She is alert. No cranial nerve deficit.   Oriented to person, place,    Skin: Skin is warm and dry. No pallor.   Surgical site bandaged, c/d/i       Significant Labs: All pertinent labs within the past 24 hours have been reviewed.

## 2017-12-27 NOTE — SUBJECTIVE & OBJECTIVE
Interval History: she is voiding    Review of Systems   Constitutional: Negative.    HENT: Negative.    Eyes: Negative.    Respiratory: Negative for cough, chest tightness and shortness of breath.    Cardiovascular: Negative for chest pain.   Gastrointestinal: Negative.  Negative for constipation, diarrhea and nausea.   Musculoskeletal: Negative.    Neurological: Negative.    Psychiatric/Behavioral: Negative.      Objective:     Temp:  [96.6 °F (35.9 °C)-98.3 °F (36.8 °C)] 96.6 °F (35.9 °C)  Pulse:  [64-87] 66  Resp:  [18-20] 18  SpO2:  [95 %-98 %] 96 %  BP: (139-148)/(60-74) 140/64     Body mass index is 25.43 kg/m².       Bladder Scan Volume (mL): 0 mL (12/26/17 1800)  Post Void Cath Residual (mL): 0 mL (12/26/17 1901)    Drains     Drain                 Urethral Catheter 12/23/17 1204 3 days                Physical Exam   Nursing note and vitals reviewed.  Constitutional: She is oriented to person, place, and time. She appears well-developed.   HENT:   Head: Normocephalic.   Eyes: Conjunctivae are normal.   Neck: Normal range of motion. No tracheal deviation present. No thyromegaly present.   Cardiovascular: Normal rate, normal heart sounds and normal pulses.    Pulmonary/Chest: Effort normal and breath sounds normal. No respiratory distress. She has no wheezes.   Abdominal: Soft. She exhibits no distension and no mass. There is no hepatosplenomegaly. There is no tenderness. There is no rebound, no guarding and no CVA tenderness. No hernia.   Musculoskeletal: Normal range of motion. She exhibits no edema or tenderness.   Lymphadenopathy:     She has no cervical adenopathy.   Neurological: She is alert and oriented to person, place, and time.   Skin: Skin is warm and dry. No rash noted. No erythema.     Psychiatric: She has a normal mood and affect. Her behavior is normal. Judgment and thought content normal.       Significant Labs:    BMP:    Recent Labs  Lab 12/25/17  0456 12/26/17  0458 12/27/17  0426     139 138   K 3.0* 3.1* 3.9    102 102   CO2 24 29 28   BUN 26* 25* 26*   CREATININE 0.9 0.8 0.9   CALCIUM 8.5* 8.7 9.3       CBC:     Recent Labs  Lab 12/23/17  0452 12/24/17  0418 12/25/17  0456   WBC 12.24 7.87 8.14   HGB 8.5* 7.7* 9.3*   HCT 23.8* 22.4* 27.8*    208 238       Blood Culture: No results for input(s): LABBLOO in the last 168 hours.  Urine Culture:   Recent Labs  Lab 12/20/17  1515 12/24/17  1612   LABURIN No growth ESCHERICHIA COLI> 100,000 cfu/ml       Significant Imaging:

## 2017-12-27 NOTE — PROGRESS NOTES
ALMA contacted patient's son Michele to report patient accepted to [facility] and is ready to discharge. Michele informed ALMA he received [email or web address] nursing home compare yesterday to his email and was not comfortable with sending patient to [facility] due to rating. Michele informed ALMA that he lives in [place] and requested SW refer patient to SNF facilities close to [place]. ALMA informed Michele the only 2 facilities close to [place] would be [facility], [facility], and [facility]. ALMA sent referral in [facility] to [facility] and [facility].

## 2017-12-27 NOTE — PROGRESS NOTES
[facility]  Urology  Progress Note    Patient Name: Oksana Estrada  MRN: [id number]  Admission Date: 12/20/2017  Hospital Length of Stay: 7 days  Code Status: Full Code   Attending Provider: Carmela Ye MD   Primary Care Physician: Red Griffith MD    Subjective:     HPI:  Urinary Retention  Patient complains of urinary retention. Onset of retention was 1 day ago and was sudden in onset. Patient currently does have a urinary catheter in place.  650 ml of urine were drained when catheter was placed. Prior to this event voiding symptoms consisted of nocturia. Prior treatments include none. Recent medications that may have affected her voiding include general anesthesia.        Interval History: she is voiding    Review of Systems   Constitutional: Negative.    HENT: Negative.    Eyes: Negative.    Respiratory: Negative for cough, chest tightness and shortness of breath.    Cardiovascular: Negative for chest pain.   Gastrointestinal: Negative.  Negative for constipation, diarrhea and nausea.   Musculoskeletal: Negative.    Neurological: Negative.    Psychiatric/Behavioral: Negative.      Objective:     Temp:  [96.6 °F (35.9 °C)-98.3 °F (36.8 °C)] 96.6 °F (35.9 °C)  Pulse:  [64-87] 66  Resp:  [18-20] 18  SpO2:  [95 %-98 %] 96 %  BP: (139-148)/(60-74) 140/64     Body mass index is 25.43 kg/m².       Bladder Scan Volume (mL): 0 mL (12/26/17 1800)  Post Void Cath Residual (mL): 0 mL (12/26/17 1901)    Drains     Drain                 Urethral Catheter 12/23/17 1204 3 days                Physical Exam   Nursing note and vitals reviewed.  Constitutional: She is oriented to person, place, and time. She appears well-developed.   HENT:   Head: Normocephalic.   Eyes: Conjunctivae are normal.   Neck: Normal range of motion. No tracheal deviation present. No thyromegaly present.   Cardiovascular: Normal rate, normal heart sounds and normal pulses.    Pulmonary/Chest: Effort normal and breath sounds  normal. No respiratory distress. She has no wheezes.   Abdominal: Soft. She exhibits no distension and no mass. There is no hepatosplenomegaly. There is no tenderness. There is no rebound, no guarding and no CVA tenderness. No hernia.   Musculoskeletal: Normal range of motion. She exhibits no edema or tenderness.   Lymphadenopathy:     She has no cervical adenopathy.   Neurological: She is alert and oriented to person, place, and time.   Skin: Skin is warm and dry. No rash noted. No erythema.     Psychiatric: She has a normal mood and affect. Her behavior is normal. Judgment and thought content normal.       Significant Labs:    BMP:    Recent Labs  Lab 12/25/17  0456 12/26/17  0458 12/27/17  0426    139 138   K 3.0* 3.1* 3.9    102 102   CO2 24 29 28   BUN 26* 25* 26*   CREATININE 0.9 0.8 0.9   CALCIUM 8.5* 8.7 9.3       CBC:     Recent Labs  Lab 12/23/17  0452 12/24/17  0418 12/25/17  0456   WBC 12.24 7.87 8.14   HGB 8.5* 7.7* 9.3*   HCT 23.8* 22.4* 27.8*    208 238       Blood Culture: No results for input(s): LABBLOO in the last 168 hours.  Urine Culture:   Recent Labs  Lab 12/20/17  1515 12/24/17  1612   LABURIN No growth ESCHERICHIA COLI> 100,000 cfu/ml       Significant Imaging:                    Assessment/Plan:     Acute cystitis without hematuria     - >100k E coli   - Abx per primary    Will sign off            VTE Risk Mitigation         Ordered     enoxaparin injection 40 mg  Daily     Route:  Subcutaneous        12/22/17 0852     Medium Risk of VTE  Once      12/20/17 2203     Place sequential compression device  Until discontinued      12/20/17 1758     Place VENUS hose  Until discontinued      12/20/17 1758          MARY Briscoe MD  Urology  [facility]

## 2017-12-27 NOTE — PLAN OF CARE
"Problem: Patient Care Overview  Goal: Plan of Care Review  Outcome: Ongoing (interventions implemented as appropriate)  Pt awake alert and not oriented to place and time.  Pt confused entire night.  Pt pulled off medical devices, sundance boots, and gown several times throughout the night. Patient surgical dressing to the right hip removed by patient and refused to have dressing replaced.  Pt legs had to be placed back in bed several times during the night; pt states "I'm going home, my  is waiting outside".  Avasys placed in patient's room to ensure safety and prevent falls. Bed in lowest position, call light within reach, bedside table near.  Patient had a sandwich tray and ate well.  Tolerated scheduled PO medications well.  Once medications were given, pt calmed down and rested until 5am.  No acute distress noted.  Will continue to monitor.        "

## 2017-12-28 VITALS
HEIGHT: 65 IN | DIASTOLIC BLOOD PRESSURE: 58 MMHG | RESPIRATION RATE: 18 BRPM | SYSTOLIC BLOOD PRESSURE: 130 MMHG | TEMPERATURE: 97 F | OXYGEN SATURATION: 97 % | HEART RATE: 76 BPM | BODY MASS INDEX: 25.46 KG/M2 | WEIGHT: 152.81 LBS

## 2017-12-28 PROBLEM — N30.00 ACUTE CYSTITIS WITHOUT HEMATURIA: Status: RESOLVED | Noted: 2017-12-26 | Resolved: 2017-12-28

## 2017-12-28 LAB
ANION GAP SERPL CALC-SCNC: 10 MMOL/L
BUN SERPL-MCNC: 25 MG/DL
CALCIUM SERPL-MCNC: 9.4 MG/DL
CHLORIDE SERPL-SCNC: 99 MMOL/L
CO2 SERPL-SCNC: 28 MMOL/L
CREAT SERPL-MCNC: 0.8 MG/DL
EST. GFR  (AFRICAN AMERICAN): >60 ML/MIN/1.73 M^2
EST. GFR  (NON AFRICAN AMERICAN): >60 ML/MIN/1.73 M^2
GLUCOSE SERPL-MCNC: 98 MG/DL
MAGNESIUM SERPL-MCNC: 1.4 MG/DL
POTASSIUM SERPL-SCNC: 3.8 MMOL/L
SODIUM SERPL-SCNC: 137 MMOL/L

## 2017-12-28 PROCEDURE — 25000003 PHARM REV CODE 250: Performed by: ORTHOPAEDIC SURGERY

## 2017-12-28 PROCEDURE — 25000003 PHARM REV CODE 250: Performed by: EMERGENCY MEDICINE

## 2017-12-28 PROCEDURE — 83735 ASSAY OF MAGNESIUM: CPT

## 2017-12-28 PROCEDURE — 97110 THERAPEUTIC EXERCISES: CPT

## 2017-12-28 PROCEDURE — 25000003 PHARM REV CODE 250: Performed by: INTERNAL MEDICINE

## 2017-12-28 PROCEDURE — 80048 BASIC METABOLIC PNL TOTAL CA: CPT

## 2017-12-28 PROCEDURE — G8978 MOBILITY CURRENT STATUS: HCPCS | Mod: CL

## 2017-12-28 PROCEDURE — 36415 COLL VENOUS BLD VENIPUNCTURE: CPT

## 2017-12-28 PROCEDURE — G8979 MOBILITY GOAL STATUS: HCPCS | Mod: CK

## 2017-12-28 PROCEDURE — G8980 MOBILITY D/C STATUS: HCPCS | Mod: CL

## 2017-12-28 RX ADMIN — CIPROFLOXACIN HYDROCHLORIDE 500 MG: 500 TABLET, FILM COATED ORAL at 08:12

## 2017-12-28 RX ADMIN — FAMOTIDINE 20 MG: 20 TABLET, FILM COATED ORAL at 08:12

## 2017-12-28 RX ADMIN — CHLORTHALIDONE 25 MG: 25 TABLET ORAL at 08:12

## 2017-12-28 RX ADMIN — LISINOPRIL 20 MG: 20 TABLET ORAL at 08:12

## 2017-12-28 RX ADMIN — ATORVASTATIN CALCIUM 20 MG: 10 TABLET, FILM COATED ORAL at 08:12

## 2017-12-28 RX ADMIN — DOCUSATE SODIUM AND SENNOSIDES 1 TABLET: 8.6; 5 TABLET, FILM COATED ORAL at 08:12

## 2017-12-28 NOTE — PLAN OF CARE
Problem: Physical Therapy Goal  Goal: Physical Therapy Goal  Goals to be met by: 2017     Patient will increase functional independence with mobility by performin. Supine to sit with Stand-by Assistance  2. Sit to stand transfer with Minimal Assistance  3. Bed to chair transfer with Minimal Assistance   4. Gait  To be assessed .   5. Sitting at edge of bed x15 minutes with Supervision  6. Lower extremity exercise program x10 reps per handout, with assistance as needed   Outcome: Unable to achieve outcome(s) by discharge  Pt to be D/C'd to SNF

## 2017-12-28 NOTE — DISCHARGE SUMMARY
[facility] Hospital Medicine  Discharge Summary      Patient Name: Oksana Estrada  MRN: [id number]  Admission Date: 12/20/2017  Hospital Length of Stay: 8 days  Discharge Date and Time:  12/28/2017 11:40 AM  Attending Physician: Vitor Tse MD   Discharging Provider: Vitor Tse MD  Primary Care Provider: Red Griffith MD      HPI:   Mrs. Oksana Estrada is a 87 y.o. female with essential hypertension, hyperlipidemia (.0 May 2018), anemia of chronic disease, dementia, history of CVA, and history of subdural hematoma who presents to [facility] ED with complaints of fall today.  She had some right leg pain after a fall today while she was trying to get out of bed to use the restroom.  EMS was activated and she was given fentanyl for pain and transported to this facility.  Further history is limited at this time.    Procedure(s) (LRB):  OPEN REDUCTION INTERNAL FIXATION-HIP (Right)      Hospital Course:   X ray showed acute comminuted fracture distal right femur just above the knee arthroplasty. She was taken for ORIF right hip on 12.21.17. PT/OT consulted. She has dementia and briefly required restraints. PT/OT evaluated patient and recommended SNF.  SW consulted. She had acute blood loss anemia as to be expected with surgery and was transfused RBCs. She had urinary retention requiring a [place] - voiding trial 12/26 passed with adequate voiding and no further complications.  Patient is currently afebrile and hemodynamically stable.  She will be discharged to SNF once arranged.       Consults:   Consults         Status Ordering Provider     Inpatient consult to Orthopedics  Once     Provider:  Jose Murphy MD    Completed NEELAM BEAN     Inpatient consult to Social Work  Once     Provider:  (Not yet assigned)    Acknowledged SHANTI FINLEY     Inpatient consult to Urology  Once     Provider:  SHEN Briscoe MD    Completed SHANTI FINLEY          No  new Assessment & Plan notes have been filed under this hospital service since the last note was generated.  Service: Hospital Medicine    Final Active Diagnoses:    Diagnosis Date Noted POA    PRINCIPAL PROBLEM:  Closed fracture of distal end of right femur [S72.401A] 12/20/2017 Yes    Essential hypertension [I10] 12/20/2017 Yes     Chronic    Hyperlipidemia [E78.5] 12/20/2017 Yes     Chronic    Anemia of chronic disease [D63.8] 12/20/2017 Yes     Chronic    History of CVA (cerebrovascular accident) [Z86.73] 10/19/2017 Not Applicable     Chronic    Dementia without behavioral disturbance [F03.90] 09/25/2017 Yes     Chronic    History of subdural hematoma [Z86.79] 02/07/2017 Not Applicable     Chronic      Problems Resolved During this Admission:    Diagnosis Date Noted Date Resolved POA    Acute cystitis without hematuria [N30.00] 12/26/2017 12/28/2017 Yes    Acute blood loss anemia [D62] 12/24/2017 12/26/2017 No    Urinary retention [R33.9] 12/23/2017 12/26/2017 No       Discharged Condition: stable    Disposition: Skilled Nursing Facility    Follow Up:  Follow-up Information     [facility]. Go on 12/28/2017.    Specialties:  Nursing Home Agency, SNF Agency  Why:  SNF Facility  Contact information:  [address] [place] [address]  [phone]             Jose L Shackleton, MD. Go on 1/3/2018.    Specialty:  Orthopedic Surgery  Why:  Outpatient Services, Ortho Surgery Follow-up Appointment, Please arrive to clinic for 1:15PM  Contact information:  [address] [place] [address]  [phone]                 Patient Instructions:     Diet Cardiac (2gm sodium and 60gm fat)     Activity as tolerated     Notify your health care provider if you experience any of the following:  temperature >100.4     Notify your health care provider if you experience any of the following:  persistent nausea and vomiting or diarrhea     Notify your health care provider if you experience any of the  following:  difficulty breathing or increased cough     Notify your health care provider if you experience any of the following:  persistent dizziness, light-headedness, or visual disturbances     Notify your health care provider if you experience any of the following:  increased confusion or weakness         Pending Diagnostic Studies:     None         Medications:  Reconciled Home Medications:   Current Discharge Medication List      START taking these medications    Details   ciprofloxacin HCl (CIPRO) 500 MG tablet Take 1 tablet (500 mg total) by mouth every 12 (twelve) hours.  Qty: 12 tablet, Refills: 0         CONTINUE these medications which have NOT CHANGED    Details   alendronate (FOSAMAX) 70 MG tablet Take 1 tablet (70 mg total) by mouth every 7 days.  Qty: 13 tablet, Refills: 3      atorvastatin (LIPITOR) 20 MG tablet Take 1 tablet (20 mg total) by mouth once daily.  Qty: 90 tablet, Refills: 3      chlordiazepoxide-clidinium 5-2.5 mg (LIBRAX) 5-2.5 mg Cap Take 1 capsule by mouth 3 (three) times daily as needed. For irritable bowel syndrome  Qty: 30 capsule, Refills: 0      chlorthalidone (HYGROTEN) 25 MG Tab Take 25 mg by mouth daily as needed.      cholestyramine-aspartame (QUESTRAN LIGHT) 4 gram PwPk Take 1 packet (4 g total) by mouth 2 (two) times daily. As needed for diarrhea  Qty: 30 packet, Refills: 1      donepezil (ARICEPT) 5 MG tablet Take 1 tablet (5 mg total) by mouth every evening. For memory  Qty: 90 tablet, Refills: 3      escitalopram oxalate (LEXAPRO) 20 MG tablet Take 1 tablet (20 mg total) by mouth once daily.  Qty: 90 tablet, Refills: 3      lisinopril (PRINIVIL,ZESTRIL) 20 MG tablet Take 1 tablet (20 mg total) by mouth once daily.  Qty: 90 tablet, Refills: 3      quetiapine (SEROQUEL) 25 MG Tab Take 1 tablet (25 mg total) by mouth every evening.  Qty: 30 tablet, Refills: 11      terbinafine HCl (LAMISIL) 250 mg tablet Take 250 mg by mouth once daily.      tolterodine (DETROL) 1 MG Tab  Take 1 tablet (1 mg total) by mouth 2 (two) times daily.  Qty: 180 tablet, Refills: 3             Indwelling Lines/Drains at time of discharge:   Lines/Drains/Airways     Drain                 Urethral Catheter 12/23/17 1204 4 days                Time spent on the discharge of patient: >30 minutes  Patient was seen and examined on the date of discharge and determined to be suitable for discharge.         Vitor Tse MD  Department of Hospital Medicine  [facility]

## 2017-12-28 NOTE — PROGRESS NOTES
Liz (admissions coordinator) with [facility] informed SW family will sign paperwork at facility today at 1:30pm. ALMA uploaded discharge orders via [facility] to [facility], patient can then discharge once paperwork signed this afternoon.

## 2017-12-28 NOTE — NURSING
[facility] here to  patient.   Patient left on stretcher with 2 EMT's.   Patient in stable condition.

## 2017-12-28 NOTE — PLAN OF CARE
12/28/17 1435   Final Note   Assessment Type Final Discharge Note   Discharge Disposition SNF  ([facility])   Hospital Follow Up  Appt(s) scheduled? Yes   Right Care Referral Info   Post Acute Recommendation SNF / Sub-Acute Rehab

## 2017-12-28 NOTE — PROGRESS NOTES
Follow-up Information     [facility]. Go on 12/28/2017.    Specialties:  Nursing Home Agency, SNF Agency  Why:  SNF Facility  Contact information:  [address]  [place] [address]  [phone]             Jose L Shackleton, MD. Go on 1/3/2018.    Specialty:  Orthopedic Surgery  Why:  Outpatient Services, Ortho Surgery Follow-up Appointment, Please arrive to clinic for 1:15PM  Contact information:  [address] [place] [address]  [phone]

## 2017-12-28 NOTE — PROGRESS NOTES
Liz (admissions coordinator) with [facility] contacted  to provide call report information (room# [phone], ask for nurse Adriano). ALMA provided nurse Marilynn with call report information and reported transportation will be arranged within the hour. ALMA contacted [facility] @ [phone] and spoke to [place] to schedule stretcher, will arrive within the hour.

## 2017-12-28 NOTE — PT/OT/SLP PROGRESS
Occupational Therapy  Clarification of Weightbearing Status      Patient Name:  Oksana Estrada   MRN:  [id number]    Received orders for NWB status, clarified with LAURA Gilbert, MD stated that patient to be TTWB x 6 weeks. PT aware.    GLO Yuen, MS  12/28/2017

## 2017-12-28 NOTE — PROGRESS NOTES
ALMA contacted Dr. Murphy office @ [phone] to schedule ortho follow-up, ALMA spoke to Mariluz, appointment scheduled for 1/3/18 @ 1:15pm.

## 2017-12-28 NOTE — PT/OT/SLP PROGRESS
"Physical Therapy Treatment    Patient Name:  Oksana Estrada   MRN:  [id number]    Recommendations:     Discharge Recommendations:  nursing facility, skilled   Discharge Equipment Recommendations: none   Barriers to discharge: None    Assessment:     Oksana Estrada is a 87 y.o. female admitted with a medical diagnosis of Closed fracture of distal end of right femur.  She presents with the following impairments/functional limitations:  weakness, impaired endurance, impaired self care skills, gait instability, impaired functional mobilty, impaired balance, decreased coordination, impaired cognition, decreased upper extremity function, decreased lower extremity function, decreased safety awareness, pain, decreased ROM, impaired coordination, impaired fine motor, impaired skin, orthopedic precautions Goals not met prior to D/C.    Rehab Prognosis:  Fair; patient would benefit from acute skilled PT services to address these deficits and reach maximum level of function.      Recent Surgery: Procedure(s) (LRB):  OPEN REDUCTION INTERNAL FIXATION-HIP (Right) 7 Days Post-Op    Plan:     During this hospitalization, patient to be seen  (N/A, pt Being D/C'd to SNF) to address the above listed problems via  (N/A)  · Plan of Care Expires:  01/19/18   Plan of Care Reviewed with: patient    Subjective     Communicated with nsg prior to session.  Patient found HOB elevated upon PT entry to room, agreeable to treatment.      Chief Complaint: pain  Patient comments/goals: none stated  Pain/Comfort:  · Pain Rating 1:  (unable to rate, "A little")  · Location 1:  (R LE)  · Pain Addressed 1: Reposition, Distraction, Cessation of Activity, Nurse notified  · Pain Rating Post-Intervention 1: 0/10    Patients cultural, spiritual, Presybeterian conflicts given the current situation: none    Objective:     Patient found with: telemetry, bed alarm, SCD, pressure relief boots     General Precautions: Standard, fall   Orthopedic " Precautions:RLE toe touch weight bearing (per Verbal order 2017 from Stoney GREENBERG/Dr. Orantes)   Braces: N/A     Functional Mobility:  · N/A      AM-PAC 6 CLICK MOBILITY  Turning over in bed (including adjusting bedclothes, sheets and blankets)?: 2  Sitting down on and standing up from a chair with arms (e.g., wheelchair, bedside commode, etc.): 2  Moving from lying on back to sitting on the side of the bed?: 2  Moving to and from a bed to a chair (including a wheelchair)?: 2  Need to walk in hospital room?: 1  Climbing 3-5 steps with a railing?: 1  Total Score: 10       Therapeutic Activities and Exercises:   Pt performed B AP's and QS x 10, received R HCS [id number], and performed R HS's with assist x 10  Patient left HOB elevated with call button in reach, bed alarm on, nsg notified and N/A present..    GOALS:    Physical Therapy Goals        Problem: Physical Therapy Goal    Goal Priority Disciplines Outcome Goal Variances Interventions   Physical Therapy Goal     PT/OT, PT Unable to achieve outcome(s) by discharge     Description:  Goals to be met by: 2017     Patient will increase functional independence with mobility by performin. Supine to sit with Stand-by Assistance  2. Sit to stand transfer with Minimal Assistance  3. Bed to chair transfer with Minimal Assistance   4. Gait  To be assessed .   5. Sitting at edge of bed x15 minutes with Supervision  6. Lower extremity exercise program x10 reps per handout, with assistance as needed                    Time Tracking:     PT Received On: 17  PT Start Time: 1405     PT Stop Time: 1415  PT Total Time (min): 10 min     Billable Minutes: Therapeutic Exercise 10    Treatment Type: Treatment  PT/PTA: PT     PTA Visit Number: 0     Sandra العلي, PT  2017

## 2017-12-28 NOTE — PLAN OF CARE
Problem: Fall Risk (Adult)  Intervention: Monitor/Assist with Self Care   17 0800 17   Daily Care Interventions   Self-Care Promotion --  --  independence encouraged   Functional Level Current   Ambulation 3 - assistive equipment and person --  --    Transferring 3 - assistive equipment and person --  --    Toileting 3 - assistive equipment and person --  --    Bathing 2 - assistive person --  --    Dressing 2 - assistive person --  --    Eating 2 - assistive person --  --    Communication 0 - understands/communicates without difficulty --  --    Swallowing 0 - swallows foods/liquids without difficulty --  --    Activity   Activity Assistance Provided --  assistance, 1 person --      Intervention: Reduce Risk/Promote Restraint Free Environment   17   Safety Interventions   Environmental Safety Modification assistive device/personal items within reach;clutter free environment maintained;room organization consistent   Prevent [place] Drop/Fall   Safety/Security Measures bed alarm set     Intervention: Review Medications/Identify Contributors to Fall Risk   17   Safety Interventions   Medication Review/Management medications reviewed     Intervention: Patient Rounds   17   Safety Interventions   Patient Rounds bed in low position;bed wheels locked;call light in reach;clutter free environment maintained;ID band on;placement of personal items at bedside;toileting offered;visualized patient     Intervention: Safety Promotion/Fall Prevention   17   Safety Interventions   Safety Promotion/Fall Prevention assistive device/personal item within reach;bed alarm set;Fall Risk reviewed with patient/family;Fall Risk signage in place;medications reviewed;nonskid shoes/socks when out of bed;/camera at bedside;side rails raised x 3;instructed to call staff for mobility       Goal: Identify Related Risk Factors and Signs and Symptoms  Related  risk factors and signs and symptoms are identified upon initiation of Human Response Clinical Practice Guideline (CPG)   Outcome: Ongoing (interventions implemented as appropriate)   12/26/17 1530   Fall Risk   Related Risk Factors (Fall Risk) age-related changes;fatigue/slow reaction;gait/mobility problems;polypharmacy;sensory deficits   Signs and Symptoms (Fall Risk) presence of risk factors     Goal: Absence of Falls  Patient will demonstrate the desired outcomes by discharge/transition of care.   Outcome: Ongoing (interventions implemented as appropriate)   12/27/17 2334   Fall Risk (Adult)   Absence of Falls making progress toward outcome       Problem: Infection, Risk/Actual (Adult)  Intervention: Manage Suspected/Actual Infection   12/27/17 2334   Safety Interventions   Isolation Precautions environmental surveillance     Intervention: Prevent Infection/Maximize Resistance   12/27/17 2334   Hygiene Care   Bathing/Skin Care incontinence care   Nutrition Interventions   Oral Nutrition Promotion social interaction promoted;physical activity promoted   Pain/Comfort/Sleep Interventions   Sleep/Rest Enhancement relaxation techniques promoted       Goal: Identify Related Risk Factors and Signs and Symptoms  Related risk factors and signs and symptoms are identified upon initiation of Human Response Clinical Practice Guideline (CPG)   Outcome: Ongoing (interventions implemented as appropriate)   12/26/17 1530   Infection, Risk/Actual   Related Risk Factors (Infection, Risk/Actual) chronic illness/condition;skin integrity impairment;surgery/procedure;trauma injury   Signs and Symptoms (Infection, Risk/Actual) pain;weakness     Goal: Infection Prevention/Resolution  Patient will demonstrate the desired outcomes by discharge/transition of care.   Outcome: Ongoing (interventions implemented as appropriate)   12/27/17 2334   Infection, Risk/Actual (Adult)   Infection Prevention/Resolution making progress toward outcome        Problem: Pressure Ulcer Risk (Talha Scale) (Adult,Obstetrics,Pediatric)  Intervention: Promote/Optimize Nutrition   12/27/17 2334   Nutrition Interventions   Oral Nutrition Promotion --      Intervention: Prevent/Manage Excess Moisture   12/27/17 2334   Hygiene Care   Bathing/Skin Care incontinence care   Skin Interventions   Skin Protection incontinence pads utilized;skin sealant/moisture barrier applied     Intervention: Maintain Head of Bed Elevation Less Than 30 Degrees as Tolerated   12/27/17 2200   Positioning   Head of Bed (HOB) HOB at 20-30 degrees     Intervention: Prevent/Minimize Sheer/Friction Injuries   12/27/17 2000   Skin Interventions   Pressure Reduction Devices pressure-redistributing mattress utilized;heel offloading device utilized;other (see comments)  (wedge)   Pressure Reduction Techniques weight shift assistance provided     Intervention: Turn/Reposition Often   12/27/17 2000 12/27/17 2200   Positioning   Body Position --  weight shift assistance provided   Skin Interventions   Pressure Reduction Techniques weight shift assistance provided --        Goal: Identify Related Risk Factors and Signs and Symptoms  Related risk factors and signs and symptoms are identified upon initiation of Human Response Clinical Practice Guideline (CPG)   Outcome: Ongoing (interventions implemented as appropriate)   12/26/17 1530   Pressure Ulcer Risk (Talha Scale)   Related Risk Factors (Pressure Ulcer Risk (Talha Scale)) age extremes;hospitalization prolonged     Goal: Skin Integrity  Patient will demonstrate the desired outcomes by discharge/transition of care.   Outcome: Ongoing (interventions implemented as appropriate)   12/27/17 2334   Pressure Ulcer Risk (Talha Scale) (Adult,Obstetrics,Pediatric)   Skin Integrity making progress toward outcome

## 2017-12-28 NOTE — PROGRESS NOTES
[facility] contacted  to request for 142, PASRR, PPD, updated PT/OT notes, chest x-ray, and immunization. Liz (admissions coordinator) informed  patient has been accepted for SNF admission.  reported patient is ready to discharge today and son Michele can be contacted to complete admission paperwork.

## 2017-12-29 ENCOUNTER — PATIENT OUTREACH (OUTPATIENT)
Dept: ADMINISTRATIVE | Facility: CLINIC | Age: 82
End: 2017-12-29

## 2018-01-04 ENCOUNTER — OUTSIDE PLACE OF SERVICE (OUTPATIENT)
Dept: INTERNAL MEDICINE | Facility: CLINIC | Age: 83
End: 2018-01-04
Payer: MEDICARE

## 2018-01-04 PROCEDURE — 99306 1ST NF CARE HIGH MDM 50: CPT | Mod: ,,, | Performed by: INTERNAL MEDICINE

## 2018-01-18 ENCOUNTER — HOSPITAL ENCOUNTER (INPATIENT)
Facility: HOSPITAL | Age: 83
LOS: 5 days | Discharge: NURSING, REHAB OR LONG-TERM CARE | DRG: 481 | End: 2018-01-23
Attending: EMERGENCY MEDICINE | Admitting: HOSPITALIST
Payer: MEDICARE

## 2018-01-18 ENCOUNTER — OUTSIDE PLACE OF SERVICE (OUTPATIENT)
Dept: INTERNAL MEDICINE | Facility: CLINIC | Age: 83
End: 2018-01-18
Payer: MEDICARE

## 2018-01-18 DIAGNOSIS — Z01.810 PREOP CARDIOVASCULAR EXAM: ICD-10-CM

## 2018-01-18 DIAGNOSIS — S72.91XA CLOSED FRACTURE OF RIGHT FEMUR, UNSPECIFIED FRACTURE MORPHOLOGY, UNSPECIFIED PORTION OF FEMUR, INITIAL ENCOUNTER: Primary | ICD-10-CM

## 2018-01-18 DIAGNOSIS — F01.50 VASCULAR DEMENTIA WITHOUT BEHAVIORAL DISTURBANCE: Chronic | ICD-10-CM

## 2018-01-18 PROCEDURE — 99285 EMERGENCY DEPT VISIT HI MDM: CPT | Mod: 25

## 2018-01-18 PROCEDURE — 93010 ELECTROCARDIOGRAM REPORT: CPT | Mod: ,,, | Performed by: INTERNAL MEDICINE

## 2018-01-18 PROCEDURE — 99308 SBSQ NF CARE LOW MDM 20: CPT | Mod: ,,, | Performed by: INTERNAL MEDICINE

## 2018-01-18 PROCEDURE — 93005 ELECTROCARDIOGRAM TRACING: CPT

## 2018-01-18 PROCEDURE — 12000002 HC ACUTE/MED SURGE SEMI-PRIVATE ROOM

## 2018-01-19 ENCOUNTER — ANESTHESIA (OUTPATIENT)
Dept: SURGERY | Facility: HOSPITAL | Age: 83
DRG: 481 | End: 2018-01-19
Payer: MEDICARE

## 2018-01-19 ENCOUNTER — ANESTHESIA EVENT (OUTPATIENT)
Dept: SURGERY | Facility: HOSPITAL | Age: 83
DRG: 481 | End: 2018-01-19
Payer: MEDICARE

## 2018-01-19 PROBLEM — F32.A DEPRESSION: Status: ACTIVE | Noted: 2018-01-19

## 2018-01-19 PROBLEM — S72.401A CLOSED FRACTURE OF DISTAL END OF RIGHT FEMUR: Status: RESOLVED | Noted: 2017-12-20 | Resolved: 2018-01-19

## 2018-01-19 PROBLEM — Z86.79 HISTORY OF SUBDURAL HEMATOMA: Chronic | Status: RESOLVED | Noted: 2017-02-07 | Resolved: 2018-01-19

## 2018-01-19 PROCEDURE — 27201423 OPTIME MED/SURG SUP & DEVICES STERILE SUPPLY: Performed by: ORTHOPAEDIC SURGERY

## 2018-01-19 PROCEDURE — 25000003 PHARM REV CODE 250: Performed by: NURSE ANESTHETIST, CERTIFIED REGISTERED

## 2018-01-19 PROCEDURE — 63600175 PHARM REV CODE 636 W HCPCS: Performed by: NURSE ANESTHETIST, CERTIFIED REGISTERED

## 2018-01-19 PROCEDURE — C1769 GUIDE WIRE: HCPCS | Performed by: ORTHOPAEDIC SURGERY

## 2018-01-19 PROCEDURE — 37000009 HC ANESTHESIA EA ADD 15 MINS: Performed by: ORTHOPAEDIC SURGERY

## 2018-01-19 PROCEDURE — 36000711: Performed by: ORTHOPAEDIC SURGERY

## 2018-01-19 PROCEDURE — C1713 ANCHOR/SCREW BN/BN,TIS/BN: HCPCS | Performed by: ORTHOPAEDIC SURGERY

## 2018-01-19 PROCEDURE — 63600175 PHARM REV CODE 636 W HCPCS: Performed by: ANESTHESIOLOGY

## 2018-01-19 PROCEDURE — 25000003 PHARM REV CODE 250: Performed by: EMERGENCY MEDICINE

## 2018-01-19 PROCEDURE — 0QPB04Z REMOVAL OF INTERNAL FIXATION DEVICE FROM RIGHT LOWER FEMUR, OPEN APPROACH: ICD-10-PCS | Performed by: ORTHOPAEDIC SURGERY

## 2018-01-19 PROCEDURE — 71000033 HC RECOVERY, INTIAL HOUR: Performed by: ORTHOPAEDIC SURGERY

## 2018-01-19 PROCEDURE — 12000002 HC ACUTE/MED SURGE SEMI-PRIVATE ROOM

## 2018-01-19 PROCEDURE — 37000008 HC ANESTHESIA 1ST 15 MINUTES: Performed by: ORTHOPAEDIC SURGERY

## 2018-01-19 PROCEDURE — 63600175 PHARM REV CODE 636 W HCPCS: Performed by: ORTHOPAEDIC SURGERY

## 2018-01-19 PROCEDURE — 0QS904Z REPOSITION LEFT FEMORAL SHAFT WITH INTERNAL FIXATION DEVICE, OPEN APPROACH: ICD-10-PCS | Performed by: ORTHOPAEDIC SURGERY

## 2018-01-19 PROCEDURE — 36000710: Performed by: ORTHOPAEDIC SURGERY

## 2018-01-19 PROCEDURE — D9220A PRA ANESTHESIA: Mod: CRNA,,, | Performed by: NURSE ANESTHETIST, CERTIFIED REGISTERED

## 2018-01-19 PROCEDURE — D9220A PRA ANESTHESIA: Mod: ANES,,, | Performed by: ANESTHESIOLOGY

## 2018-01-19 DEVICE — IMPLANTABLE DEVICE: Type: IMPLANTABLE DEVICE | Site: FEMUR | Status: FUNCTIONAL

## 2018-01-19 DEVICE — SCREW CANN VA LOK 5X80MM: Type: IMPLANTABLE DEVICE | Site: FEMUR | Status: FUNCTIONAL

## 2018-01-19 DEVICE — SCREW CANN VA LOK 5X55MM: Type: IMPLANTABLE DEVICE | Site: FEMUR | Status: FUNCTIONAL

## 2018-01-19 DEVICE — SCREW STRDRV VA LOK 5X40MM: Type: IMPLANTABLE DEVICE | Site: FEMUR | Status: FUNCTIONAL

## 2018-01-19 DEVICE — SCREW CANN VA LOK 5X70MM: Type: IMPLANTABLE DEVICE | Site: FEMUR | Status: FUNCTIONAL

## 2018-01-19 DEVICE — SCREW CANN VA LOK 5X75MM: Type: IMPLANTABLE DEVICE | Site: FEMUR | Status: FUNCTIONAL

## 2018-01-19 DEVICE — GUIDEWIRE ORTHO CANN 2X230MM: Type: IMPLANTABLE DEVICE | Site: FEMUR | Status: FUNCTIONAL

## 2018-01-19 DEVICE — SCREW BONE VA LK 5.0X38MM: Type: IMPLANTABLE DEVICE | Site: FEMUR | Status: FUNCTIONAL

## 2018-01-19 RX ORDER — GENTAMICIN SULFATE 40 MG/ML
INJECTION, SOLUTION INTRAMUSCULAR; INTRAVENOUS
Status: DISCONTINUED | OUTPATIENT
Start: 2018-01-19 | End: 2018-01-19 | Stop reason: HOSPADM

## 2018-01-19 RX ORDER — TRAMADOL HYDROCHLORIDE 50 MG/1
50 TABLET ORAL EVERY 6 HOURS PRN
Status: DISCONTINUED | OUTPATIENT
Start: 2018-01-19 | End: 2018-01-23 | Stop reason: HOSPADM

## 2018-01-19 RX ORDER — DIPHENHYDRAMINE HYDROCHLORIDE 50 MG/ML
25 INJECTION INTRAMUSCULAR; INTRAVENOUS EVERY 6 HOURS PRN
Status: DISCONTINUED | OUTPATIENT
Start: 2018-01-19 | End: 2018-01-19 | Stop reason: HOSPADM

## 2018-01-19 RX ORDER — HYDROMORPHONE HYDROCHLORIDE 2 MG/ML
0.2 INJECTION, SOLUTION INTRAMUSCULAR; INTRAVENOUS; SUBCUTANEOUS EVERY 5 MIN PRN
Status: DISCONTINUED | OUTPATIENT
Start: 2018-01-19 | End: 2018-01-19 | Stop reason: HOSPADM

## 2018-01-19 RX ORDER — FENTANYL CITRATE 50 UG/ML
INJECTION, SOLUTION INTRAMUSCULAR; INTRAVENOUS
Status: DISCONTINUED | OUTPATIENT
Start: 2018-01-19 | End: 2018-01-19

## 2018-01-19 RX ORDER — FAMOTIDINE 20 MG/1
20 TABLET, FILM COATED ORAL DAILY
Status: DISCONTINUED | OUTPATIENT
Start: 2018-01-19 | End: 2018-01-23 | Stop reason: HOSPADM

## 2018-01-19 RX ORDER — ACETAMINOPHEN 325 MG/1
650 TABLET ORAL EVERY 8 HOURS PRN
Status: DISCONTINUED | OUTPATIENT
Start: 2018-01-19 | End: 2018-01-23 | Stop reason: HOSPADM

## 2018-01-19 RX ORDER — PHENYLEPHRINE HYDROCHLORIDE 10 MG/ML
INJECTION INTRAVENOUS
Status: DISCONTINUED | OUTPATIENT
Start: 2018-01-19 | End: 2018-01-19

## 2018-01-19 RX ORDER — LIDOCAINE HCL/PF 100 MG/5ML
SYRINGE (ML) INTRAVENOUS
Status: DISCONTINUED | OUTPATIENT
Start: 2018-01-19 | End: 2018-01-19

## 2018-01-19 RX ORDER — QUETIAPINE FUMARATE 25 MG/1
25 TABLET, FILM COATED ORAL NIGHTLY
Status: DISCONTINUED | OUTPATIENT
Start: 2018-01-19 | End: 2018-01-23 | Stop reason: HOSPADM

## 2018-01-19 RX ORDER — GLYCOPYRROLATE 0.2 MG/ML
INJECTION INTRAMUSCULAR; INTRAVENOUS
Status: DISCONTINUED | OUTPATIENT
Start: 2018-01-19 | End: 2018-01-19

## 2018-01-19 RX ORDER — ATORVASTATIN CALCIUM 10 MG/1
20 TABLET, FILM COATED ORAL DAILY
Status: DISCONTINUED | OUTPATIENT
Start: 2018-01-19 | End: 2018-01-23 | Stop reason: HOSPADM

## 2018-01-19 RX ORDER — ONDANSETRON 2 MG/ML
4 INJECTION INTRAMUSCULAR; INTRAVENOUS EVERY 4 HOURS PRN
Status: DISCONTINUED | OUTPATIENT
Start: 2018-01-19 | End: 2018-01-23 | Stop reason: HOSPADM

## 2018-01-19 RX ORDER — ENOXAPARIN SODIUM 100 MG/ML
40 INJECTION SUBCUTANEOUS EVERY 24 HOURS
Status: DISCONTINUED | OUTPATIENT
Start: 2018-01-20 | End: 2018-01-23 | Stop reason: HOSPADM

## 2018-01-19 RX ORDER — MEPERIDINE HYDROCHLORIDE 50 MG/ML
12.5 INJECTION INTRAMUSCULAR; INTRAVENOUS; SUBCUTANEOUS ONCE AS NEEDED
Status: DISCONTINUED | OUTPATIENT
Start: 2018-01-19 | End: 2018-01-19 | Stop reason: HOSPADM

## 2018-01-19 RX ORDER — LISINOPRIL 20 MG/1
20 TABLET ORAL DAILY
Status: DISCONTINUED | OUTPATIENT
Start: 2018-01-19 | End: 2018-01-23 | Stop reason: HOSPADM

## 2018-01-19 RX ORDER — OXYBUTYNIN CHLORIDE 5 MG/1
5 TABLET, EXTENDED RELEASE ORAL DAILY
Status: DISCONTINUED | OUTPATIENT
Start: 2018-01-19 | End: 2018-01-23 | Stop reason: HOSPADM

## 2018-01-19 RX ORDER — ESCITALOPRAM OXALATE 10 MG/1
20 TABLET ORAL DAILY
Status: DISCONTINUED | OUTPATIENT
Start: 2018-01-19 | End: 2018-01-23 | Stop reason: HOSPADM

## 2018-01-19 RX ORDER — ROCURONIUM BROMIDE 10 MG/ML
INJECTION, SOLUTION INTRAVENOUS
Status: DISCONTINUED | OUTPATIENT
Start: 2018-01-19 | End: 2018-01-19

## 2018-01-19 RX ORDER — POLYETHYLENE GLYCOL 3350 17 G/17G
17 POWDER, FOR SOLUTION ORAL DAILY
Status: DISCONTINUED | OUTPATIENT
Start: 2018-01-19 | End: 2018-01-23 | Stop reason: HOSPADM

## 2018-01-19 RX ORDER — SUCCINYLCHOLINE CHLORIDE 20 MG/ML
INJECTION INTRAMUSCULAR; INTRAVENOUS
Status: DISCONTINUED | OUTPATIENT
Start: 2018-01-19 | End: 2018-01-19

## 2018-01-19 RX ORDER — ACETAMINOPHEN 10 MG/ML
1000 INJECTION, SOLUTION INTRAVENOUS ONCE
Status: COMPLETED | OUTPATIENT
Start: 2018-01-19 | End: 2018-01-19

## 2018-01-19 RX ORDER — SODIUM CHLORIDE, SODIUM LACTATE, POTASSIUM CHLORIDE, CALCIUM CHLORIDE 600; 310; 30; 20 MG/100ML; MG/100ML; MG/100ML; MG/100ML
INJECTION, SOLUTION INTRAVENOUS CONTINUOUS PRN
Status: DISCONTINUED | OUTPATIENT
Start: 2018-01-19 | End: 2018-01-19

## 2018-01-19 RX ORDER — CEFAZOLIN SODIUM 2 G/50ML
2 SOLUTION INTRAVENOUS ONCE
Status: DISCONTINUED | OUTPATIENT
Start: 2018-01-19 | End: 2018-01-23

## 2018-01-19 RX ORDER — SODIUM CHLORIDE 0.9 % (FLUSH) 0.9 %
3 SYRINGE (ML) INJECTION
Status: DISCONTINUED | OUTPATIENT
Start: 2018-01-19 | End: 2018-01-19 | Stop reason: HOSPADM

## 2018-01-19 RX ORDER — PROPOFOL 10 MG/ML
VIAL (ML) INTRAVENOUS
Status: DISCONTINUED | OUTPATIENT
Start: 2018-01-19 | End: 2018-01-19

## 2018-01-19 RX ORDER — METOCLOPRAMIDE HYDROCHLORIDE 5 MG/ML
10 INJECTION INTRAMUSCULAR; INTRAVENOUS EVERY 10 MIN PRN
Status: DISCONTINUED | OUTPATIENT
Start: 2018-01-19 | End: 2018-01-19 | Stop reason: HOSPADM

## 2018-01-19 RX ORDER — CEFAZOLIN SODIUM 1 G/50ML
1 SOLUTION INTRAVENOUS
Status: COMPLETED | OUTPATIENT
Start: 2018-01-19 | End: 2018-01-20

## 2018-01-19 RX ORDER — METOCLOPRAMIDE HYDROCHLORIDE 5 MG/ML
INJECTION INTRAMUSCULAR; INTRAVENOUS
Status: DISCONTINUED | OUTPATIENT
Start: 2018-01-19 | End: 2018-01-19

## 2018-01-19 RX ORDER — DONEPEZIL HYDROCHLORIDE 5 MG/1
5 TABLET, FILM COATED ORAL NIGHTLY
Status: DISCONTINUED | OUTPATIENT
Start: 2018-01-19 | End: 2018-01-23 | Stop reason: HOSPADM

## 2018-01-19 RX ORDER — ONDANSETRON 2 MG/ML
INJECTION INTRAMUSCULAR; INTRAVENOUS
Status: DISCONTINUED | OUTPATIENT
Start: 2018-01-19 | End: 2018-01-19

## 2018-01-19 RX ORDER — CEFAZOLIN SODIUM 1 G/3ML
2 INJECTION, POWDER, FOR SOLUTION INTRAMUSCULAR; INTRAVENOUS
Status: DISCONTINUED | OUTPATIENT
Start: 2018-01-19 | End: 2018-01-19

## 2018-01-19 RX ORDER — SODIUM CHLORIDE 9 MG/ML
INJECTION, SOLUTION INTRAVENOUS CONTINUOUS
Status: DISCONTINUED | OUTPATIENT
Start: 2018-01-19 | End: 2018-01-23 | Stop reason: HOSPADM

## 2018-01-19 RX ORDER — METOPROLOL SUCCINATE 50 MG/1
50 TABLET, EXTENDED RELEASE ORAL DAILY
Status: DISCONTINUED | OUTPATIENT
Start: 2018-01-19 | End: 2018-01-23 | Stop reason: HOSPADM

## 2018-01-19 RX ORDER — CEFAZOLIN SODIUM 1 G/3ML
INJECTION, POWDER, FOR SOLUTION INTRAMUSCULAR; INTRAVENOUS
Status: DISCONTINUED | OUTPATIENT
Start: 2018-01-19 | End: 2018-01-19

## 2018-01-19 RX ADMIN — SODIUM CHLORIDE, SODIUM LACTATE, POTASSIUM CHLORIDE, AND CALCIUM CHLORIDE: .6; .31; .03; .02 INJECTION, SOLUTION INTRAVENOUS at 11:01

## 2018-01-19 RX ADMIN — ROCURONIUM BROMIDE 20 MG: 10 INJECTION, SOLUTION INTRAVENOUS at 12:01

## 2018-01-19 RX ADMIN — PHENYLEPHRINE HYDROCHLORIDE 100 MCG: 10 INJECTION INTRAVENOUS at 01:01

## 2018-01-19 RX ADMIN — ACETAMINOPHEN 1000 MG: 10 INJECTION, SOLUTION INTRAVENOUS at 02:01

## 2018-01-19 RX ADMIN — METOPROLOL SUCCINATE 50 MG: 50 TABLET, EXTENDED RELEASE ORAL at 09:01

## 2018-01-19 RX ADMIN — PROPOFOL 20 MG: 10 INJECTION, EMULSION INTRAVENOUS at 01:01

## 2018-01-19 RX ADMIN — ROCURONIUM BROMIDE 5 MG: 10 INJECTION, SOLUTION INTRAVENOUS at 12:01

## 2018-01-19 RX ADMIN — LIDOCAINE HYDROCHLORIDE 50 MG: 20 INJECTION, SOLUTION INTRAVENOUS at 11:01

## 2018-01-19 RX ADMIN — QUETIAPINE FUMARATE 25 MG: 25 TABLET ORAL at 01:01

## 2018-01-19 RX ADMIN — PHENYLEPHRINE HYDROCHLORIDE 100 MCG: 10 INJECTION INTRAVENOUS at 12:01

## 2018-01-19 RX ADMIN — PROPOFOL 50 MG: 10 INJECTION, EMULSION INTRAVENOUS at 11:01

## 2018-01-19 RX ADMIN — LIDOCAINE HYDROCHLORIDE 20 MG: 20 INJECTION, SOLUTION INTRAVENOUS at 12:01

## 2018-01-19 RX ADMIN — FAMOTIDINE 20 MG: 20 TABLET, FILM COATED ORAL at 09:01

## 2018-01-19 RX ADMIN — DONEPEZIL HYDROCHLORIDE 5 MG: 5 TABLET, FILM COATED ORAL at 01:01

## 2018-01-19 RX ADMIN — GLYCOPYRROLATE 0.2 MG: 0.2 INJECTION, SOLUTION INTRAMUSCULAR; INTRAVENOUS at 12:01

## 2018-01-19 RX ADMIN — CEFAZOLIN 1 G: 1 INJECTION, POWDER, FOR SOLUTION INTRAVENOUS at 11:01

## 2018-01-19 RX ADMIN — FENTANYL CITRATE 50 MCG: 50 INJECTION INTRAMUSCULAR; INTRAVENOUS at 11:01

## 2018-01-19 RX ADMIN — LIDOCAINE HYDROCHLORIDE 20 MG: 20 INJECTION, SOLUTION INTRAVENOUS at 11:01

## 2018-01-19 RX ADMIN — PHENYLEPHRINE HYDROCHLORIDE 200 MCG: 10 INJECTION INTRAVENOUS at 12:01

## 2018-01-19 RX ADMIN — SODIUM CHLORIDE: 0.9 INJECTION, SOLUTION INTRAVENOUS at 01:01

## 2018-01-19 RX ADMIN — SODIUM CHLORIDE, SODIUM LACTATE, POTASSIUM CHLORIDE, AND CALCIUM CHLORIDE: .6; .31; .03; .02 INJECTION, SOLUTION INTRAVENOUS at 12:01

## 2018-01-19 RX ADMIN — ROCURONIUM BROMIDE 5 MG: 10 INJECTION, SOLUTION INTRAVENOUS at 11:01

## 2018-01-19 RX ADMIN — QUETIAPINE FUMARATE 25 MG: 25 TABLET ORAL at 08:01

## 2018-01-19 RX ADMIN — CEFAZOLIN 1 G: 1 INJECTION, POWDER, FOR SOLUTION INTRAVENOUS at 12:01

## 2018-01-19 RX ADMIN — PROPOFOL 20 MG: 10 INJECTION, EMULSION INTRAVENOUS at 12:01

## 2018-01-19 RX ADMIN — DONEPEZIL HYDROCHLORIDE 5 MG: 5 TABLET, FILM COATED ORAL at 08:01

## 2018-01-19 RX ADMIN — ESCITALOPRAM OXALATE 20 MG: 10 TABLET ORAL at 09:01

## 2018-01-19 RX ADMIN — ONDANSETRON 4 MG: 2 INJECTION, SOLUTION INTRAMUSCULAR; INTRAVENOUS at 12:01

## 2018-01-19 RX ADMIN — SODIUM CHLORIDE: 0.9 INJECTION, SOLUTION INTRAVENOUS at 04:01

## 2018-01-19 RX ADMIN — SUCCINYLCHOLINE CHLORIDE 100 MG: 20 INJECTION, SOLUTION INTRAMUSCULAR; INTRAVENOUS at 11:01

## 2018-01-19 RX ADMIN — METOCLOPRAMIDE 10 MG: 5 INJECTION, SOLUTION INTRAMUSCULAR; INTRAVENOUS at 12:01

## 2018-01-19 NOTE — HOSPITAL COURSE
The patient was admitted to the hospital for evaluation and treatment of R hip angulated fracture.  She had a repair of this 12/22/17 and apparently suffered some trauma while [facility].  Ortho was consulted and the patient was taken back to surgery on 1/19/18 for repair. PT/OT evaluated the patient and recommended SNF. The patient had a drop in her H/H and was transfused 2 units on 1/21. Ortho was notified. Discharged to SNF.

## 2018-01-19 NOTE — ED TRIAGE NOTES
Pt presents to ED with c/o right leg pain. Pt is transfer to ED from [facility]. Pt was at rehab facility after a right femur fracture on December. Pt reports while during physical therapy yesterday she injured her right leg. Per notes, pt received an x ray which reveals a fracture of the right femur. Pulses in feet present, no bruising noted. Pt reports pain upon movement. No deformity noted at this time, ortho splint in place.  Pt states that prior to injury she was fully independent of all ADLs.  Pt is oriented x 4 at this time. No distress is noted. Will continue to be monitored.

## 2018-01-19 NOTE — NURSING
Patient transported to surgery area patients son contacted by phone to discuss procedure. Patient remains npo and sips of water to take medications pre op. No acute distress or changes.

## 2018-01-19 NOTE — H&P
[facility] Medicine  History & Physical    Patient Name: Oksana Estrada  MRN: [id number]  Admission Date: 1/18/2018  Attending Physician: Po Delarosa MD   Primary Care Provider: Red Griffith MD         Patient information was obtained from patient and ER records.     Subjective:     Principal Problem:Closed fracture of right femur    Chief Complaint:   Chief Complaint   Patient presents with    Leg Pain     brought by ems from [facility]. pt was there in a rehab facility s/p right femur fracture from dec. 28th 2017. pt states today she was doing rehab and re-injured her right leg. they did an xray there and said it was re-fractured. pt sent here because this is where her surgery was done        HPI: Mrs. Estrada is an 86 yo F who presents with a CC of R hip pain.  The patient evidently had a R femur fracture/repair on 12/22/17 and was sent to a rehab facility. [facility]  She is not sure of the event- but apparently had some trauma to the R hip. She presents to the ED and is found to have an angulated fracture. Ortho has been consulted and plans on surgery this am.  Otherwise, she has no complaints and is quite grumpy.      Past Medical History:   Diagnosis Date    Dementia     Depression     Hypertension     Stroke        Past Surgical History:   Procedure Laterality Date    BREAST SURGERY      lumpectomy left breast    JOINT REPLACEMENT      bilateral knee replacement       Review of patient's allergies indicates:   Allergen Reactions    Codeine     Penicillins        Current Facility-Administered Medications on File Prior to Encounter   Medication    [COMPLETED] fentaNYL injection 50 mcg    [COMPLETED] fentaNYL injection 75 mcg    [COMPLETED] sodium chloride 0.9% bolus 500 mL     Current Outpatient Prescriptions on File Prior to Encounter   Medication Sig    alendronate (FOSAMAX) 70 MG tablet Take 1 tablet (70 mg total) by  mouth every 7 days.    atorvastatin (LIPITOR) 20 MG tablet Take 1 tablet (20 mg total) by mouth once daily.    chlordiazepoxide-clidinium 5-2.5 mg (LIBRAX) 5-2.5 mg Cap Take 1 capsule by mouth 3 (three) times daily as needed. For irritable bowel syndrome    chlorthalidone (HYGROTEN) 25 MG Tab Take 25 mg by mouth daily as needed.    cholestyramine-aspartame (QUESTRAN LIGHT) 4 gram PwPk Take 1 packet (4 g total) by mouth 2 (two) times daily. As needed for diarrhea    donepezil (ARICEPT) 5 MG tablet Take 1 tablet (5 mg total) by mouth every evening. For memory    escitalopram oxalate (LEXAPRO) 20 MG tablet Take 1 tablet (20 mg total) by mouth once daily.    lisinopril (PRINIVIL,ZESTRIL) 20 MG tablet Take 1 tablet (20 mg total) by mouth once daily.    metoprolol succinate (TOPROL-XL) 50 MG 24 hr tablet Take 50 mg by mouth once daily.    quetiapine (SEROQUEL) 25 MG Tab Take 1 tablet (25 mg total) by mouth every evening.    terbinafine HCl (LAMISIL) 250 mg tablet Take 250 mg by mouth once daily.    tolterodine (DETROL) 1 MG Tab Take 1 tablet (1 mg total) by mouth 2 (two) times daily.    traMADol (ULTRAM) 50 mg tablet Take 50 mg by mouth every 6 (six) hours as needed for Pain.     Family History     None        Social History Main Topics    Smoking status: Former Smoker     Types: Cigarettes    Smokeless tobacco: Never Used      Comment: quit 25 years ago    Alcohol use No    Drug use: No    Sexual activity: No     Review of Systems   Constitutional: Positive for activity change. Negative for appetite change, diaphoresis, fatigue and fever.   Eyes: Negative for discharge.   Respiratory: Negative for chest tightness and shortness of breath.    Cardiovascular: Negative for chest pain and leg swelling.   Gastrointestinal: Negative for abdominal distention, diarrhea, nausea and vomiting.   Genitourinary: Negative for difficulty urinating.     Objective:     Vital Signs (Most Recent):  Temp: 98 °F (36.7 °C)  (01/19/18 0755)  Pulse: 61 (01/19/18 0755)  Resp: 18 (01/19/18 0755)  BP: (!) 111/53 (01/19/18 0755)  SpO2: 97 % (01/19/18 0755) Vital Signs (24h Range):  Temp:  [97.5 °F (36.4 °C)-98.7 °F (37.1 °C)] 98 °F (36.7 °C)  Pulse:  [59-85] 61  Resp:  [16-18] 18  SpO2:  [95 %-99 %] 97 %  BP: (111-132)/(53-70) 111/53     Weight: 66.1 kg (145 lb 11.6 oz)  Body mass index is 22.16 kg/m².    Physical Exam   Constitutional: She is oriented to person, place, and time. She appears well-developed and well-nourished.   HENT:   Head: Normocephalic and atraumatic.   Cardiovascular: Normal rate.  Exam reveals no gallop and no friction rub.    Pulmonary/Chest: Effort normal and breath sounds normal. No respiratory distress. She has no wheezes. She has no rales.   Abdominal: Soft. She exhibits no distension and no mass. There is no tenderness. There is no guarding.   Neurological: She is alert and oriented to person, place, and time.   Skin: Skin is warm and dry.   Psychiatric: Her behavior is normal.   Angry    Vitals reviewed.          Significant Labs:   BMP:   Recent Labs  Lab 01/18/18  1355   *   *   K 4.6   CL 98   CO2 30*   BUN 23*   CREATININE 0.96   CALCIUM 8.9     CBC:   Recent Labs  Lab 01/18/18  1355   WBC 10.98   HGB 10.1*   HCT 30.8*   *       Significant Imaging: imaging reviewed.   All labs reviewed.     Assessment/Plan:     * Closed fracture of right femur    Ortho consult.  Evidently will go to surgery today           Depression    Continue home meds.         Anemia of chronic disease    No acute issues         Hyperlipidemia    Continue home meds.           Essential hypertension    Continue home meds.           Dementia without behavioral disturbance    Seems very angry             VTE Risk Mitigation         Ordered     Medium Risk of VTE  Once      01/19/18 0057     Reason for No Pharmacological VTE Prophylaxis  Once      01/19/18 0057        To OR today. Likely will need SNF.       Po METZGER  MD Marine  Department of Hospital Medicine   [facility]

## 2018-01-19 NOTE — ANESTHESIA POSTPROCEDURE EVALUATION
"Anesthesia Post Evaluation    Patient: Oksana Estrada    Procedure(s) Performed: Procedure(s) (LRB):  OPEN REDUCTION INTERNAL FIXATION-FEMUR (Right)    Final Anesthesia Type: general  Patient location during evaluation: PACU  Patient participation: Yes- Able to Participate  Level of consciousness: awake and alert, oriented and awake  Post-procedure vital signs: reviewed and stable  Pain management: adequate  Airway patency: patent  PONV status at discharge: No PONV  Anesthetic complications: no      Cardiovascular status: blood pressure returned to baseline  Respiratory status: unassisted and spontaneous ventilation  Hydration status: euvolemic  Follow-up not needed.        Visit Vitals  BP (!) 124/54   Pulse 80   Temp 35.6 °C (96.1 °F) (Oral)   Resp 14   Ht 5' 8" (1.727 m)   Wt 66.1 kg (145 lb 11.6 oz)   SpO2 100%   Breastfeeding? No   BMI 22.16 kg/m²       Pain/Ivan Score: Pain Assessment Performed: Yes (1/19/2018  2:20 PM)  Presence of Pain: denies (1/19/2018  2:20 PM)  Pain Rating Prior to Med Admin: 7 (1/18/2018  4:35 PM)  Ivan Score: 8 (1/19/2018  2:20 PM)      "

## 2018-01-19 NOTE — TRANSFER OF CARE
"Anesthesia Transfer of Care Note    Patient: Oksana Estrada    Procedure(s) Performed: Procedure(s) (LRB):  OPEN REDUCTION INTERNAL FIXATION-FEMUR (Right)    Patient location: PACU    Anesthesia Type: general    Transport from OR: Transported from OR on room air with adequate spontaneous ventilation    Post pain: adequate analgesia    Post assessment: no apparent anesthetic complications and tolerated procedure well    Post vital signs: stable    Level of consciousness: awake, alert and oriented    Nausea/Vomiting: no nausea/vomiting    Complications: none    Transfer of care protocol was followed      Last vitals:   Visit Vitals  BP (!) 124/54   Pulse 80   Temp 35.6 °C (96.1 °F) (Oral)   Resp 14   Ht 5' 8" (1.727 m)   Wt 66.1 kg (145 lb 11.6 oz)   SpO2 100%   Breastfeeding? No   BMI 22.16 kg/m²     "

## 2018-01-19 NOTE — NURSING
Pt received from ED via stretcher; pt is awake and alert, complains of right leg pain with movement; soft splint in place

## 2018-01-19 NOTE — HPI
Mrs. Estrada is an 88 yo F who presents with a CC of R hip pain.  The patient evidently had a R femur fracture/repair on 12/22/17 and was sent to a rehab facility. [facility]  She is not sure of the event- but apparently had some trauma to the R hip. She presents to the ED and is found to have an angulated fracture. Ortho has been consulted and plans on surgery this am.  Otherwise, she has no complaints and is quite grumpy.

## 2018-01-19 NOTE — BRIEF OP NOTE
Operative Note         SUMMARY     Surgery Date: 1/19/2018     Surgeon(s) and Role:     * Shashi Garcia III, MD - Primary    Pre-op Diagnosis:  Closed fracture of right femur [S72.91XA], periprosthetic    Post-op Diagnosis: same    Procedure(s) (LRB):  OPEN REDUCTION INTERNAL FIXATION-FEMUR (Right)   HWR R femur    Anesthesia: Choice    Findings/Key Components:      Estimated Blood Loss: 100ml         Specimens     None

## 2018-01-19 NOTE — NURSING
Patient has not returned from surgical services report on patient given to nikole rn on patients progress and updated handoff report sheet given to her.

## 2018-01-19 NOTE — NURSING
Bedside rounding report received from lamine herron rn on patients progress and updated hand off report sheet received too. Patient resting in her bed . Tolerating iv fluids. Assessment completed and plan written down on board for patient briefly discussed plan with patient . Nodded agreement. Denies pain at rest. Right leg is wrapped in thick bandage type gauze to immobilize right leg good refill in both feet no cyanosis with palpable pulses x 4. Head of bed elevated side rails up x3 call light in use and head of bed elevated bed alarm is on. Continue to monitor.

## 2018-01-19 NOTE — ED PROVIDER NOTES
"Encounter Date: 1/18/2018    SCRIBE #1 NOTE: I, Bill Ambrose, am scribing for, and in the presence of,  Beatrice Morales MD. I have scribed the following portions of the note - Other sections scribed: HPI, ROS, PE.       History     Chief Complaint   Patient presents with    Leg Pain     brought by ems from [facility]. pt was there in a rehab facility s/p right femur fracture from dec. 28th 2017. pt states today she was doing rehab and re-injured her right leg. they did an xray there and said it was re-fractured. pt sent here because this is where her surgery was done     CC: Leg Pain    HPI: This 87 y.o. Female with dementia, NH resident, presents to the ED via EMS from [facility] for R leg pain and deformity. Pt had a fall in December 2017 resulting in R femur fx, which required ORIF. She was found having fallen in NH today. Pt cannot provide any meaningful hx. She presented to [facility] today with a new deformity to her R leg; an xray was taken there and the leg was shown to be re-fractured. Pt says "just let me go back to sleep". Pt denies numbness. Pt does not remember the fall and denies trauma.     History is limited due to dementia.      The history is provided by the patient. The history is limited by the condition of the patient. No  was used.     Review of patient's allergies indicates:   Allergen Reactions    Codeine     Penicillins      Past Medical History:   Diagnosis Date    Dementia     Depression     Hypertension     Stroke      Past Surgical History:   Procedure Laterality Date    BREAST SURGERY      JOINT REPLACEMENT      bilateral knee replacement     No family history on file.  Social History   Substance Use Topics    Smoking status: Former Smoker     Types: Cigarettes    Smokeless tobacco: Never Used      Comment: quit 25 years ago    Alcohol use No     Review of Systems   Unable to perform ROS: Dementia   Musculoskeletal:        (+) R " leg pain   Neurological: Negative for numbness.       Physical Exam     Initial Vitals [01/18/18 1831]   BP Pulse Resp Temp SpO2   126/70 60 17 98 °F (36.7 °C) 98 %      MAP       88.67         Physical Exam    Nursing note and vitals reviewed.  Constitutional: She appears well-developed and well-nourished. She is not diaphoretic.  Non-toxic appearance. She does not appear ill.   Frail elderly woman. NAD.   HENT:   Head: Normocephalic and atraumatic.   Eyes: Conjunctivae and EOM are normal. Pupils are equal, round, and reactive to light.   Neck: Normal range of motion. Neck supple.   Cardiovascular: Normal rate and regular rhythm.   Intact dorsal pedalis pulses   Pulmonary/Chest: Effort normal and breath sounds normal. No respiratory distress.   Abdominal: Soft. Normal appearance and bowel sounds are normal. She exhibits no distension. There is no tenderness.   Musculoskeletal: She exhibits tenderness.   Right thigh in a splint with deformity. DP pulses 2+ bilat.   Neurological: She is alert. She has normal strength.   Skin: Skin is warm and dry.   Psychiatric:   Confused         ED Course   Procedures  Labs Reviewed - No data to display  EKG Readings: (Independently Interpreted)   23:16: NSR, HR 65. L axis. Nonspecific intraventricular conduction delay. No STEMI.       X-Rays:   Independently Interpreted Readings:   Other Readings:  CT head/cspine NAD  CXR NAD    Medical Decision Making:   History:   Old Medical Records: I decided to obtain old medical records.  Old Records Summarized: records from previous admission(s).  Initial Assessment:   This is an emergent evaluation of a 87 y.o. Female s/p fall. She had a R femur fx ORIF in December. Today she was found down at NH with obvious RLE deformity. At [facility], an XR showed R femur fx adjacent to prior hardware.  Differential Diagnosis:   Ddx includes vascular compromise, unrecognized head, cspine, chest trauma (hx of SDH), other.  Independently Interpreted Test(s):    I have ordered and independently interpreted X-rays - see prior notes.  I have ordered and independently interpreted EKG Reading(s) - see prior notes  Clinical Tests:   Radiological Study: Ordered and Reviewed  Medical Tests: Ordered and Reviewed  ED Management:  Prior labs by [facility] without acute pathology.    CT head/cspine obtained as patient evidently fell with enough force to fx her femur and her confusion limits the utility of exam. These were negative. CXR also without acute pathology.     Discussed with hitesh Sinha, who will try to take to OR tomorrow. Admitted to medicine service due to complex medical history. Discussed with taran Tinajero. I have written courtesy orders.             Scribe Attestation:   Scribe #1: I performed the above scribed service and the documentation accurately describes the services I performed. I attest to the accuracy of the note.    Attending Attestation:           Physician Attestation for Scribe:  Physician Attestation Statement for Scribe #1: I, Beatrice Morales MD, reviewed documentation, as scribed by Bill Ambrose in my presence, and it is both accurate and complete.                 ED Course      Clinical Impression:   The primary encounter diagnosis was Closed fracture of right femur, unspecified fracture morphology, unspecified portion of femur, initial encounter. Diagnoses of Vascular dementia without behavioral disturbance and Preop cardiovascular exam were also pertinent to this visit.                           Beatrice Morales MD  01/19/18 2381

## 2018-01-19 NOTE — PLAN OF CARE
Problem: Fall Risk (Adult)  Goal: Identify Related Risk Factors and Signs and Symptoms  Related risk factors and signs and symptoms are identified upon initiation of Human Response Clinical Practice Guideline (CPG)    01/19/18 0654   Fall Risk   Related Risk Factors (Fall Risk) age-related changes;environment unfamiliar;history of falls     Goal: Absence of Falls  Patient will demonstrate the desired outcomes by discharge/transition of care.    01/19/18 0654   Fall Risk (Adult)   Absence of Falls making progress toward outcome

## 2018-01-19 NOTE — CONSULTS
C.C. Right femur fracture above current hardware    HPI: Oksana Estradais87 y.o. complaining of right LE pain s/p fall with fracture above a plate at right femur. Pt recently underwent orif right distal femur with a new fall suffering a fracture at the proximal end of the current plate displaced. She is demented and was transferred from [facility] for treatment.    ROS:   Pertinent positives: right hip pain   Negatives: F/C, N/V, CP, SOB,    All other 14 point ROS negative    PMH:   Past Medical History:   Diagnosis Date    Dementia     Depression     Hypertension     Stroke        PSH:   Past Surgical History:   Procedure Laterality Date    BREAST SURGERY      lumpectomy left breast    JOINT REPLACEMENT      bilateral knee replacement       Social Hx:   Social History     Occupational History    Not on file.     Social History Main Topics    Smoking status: Former Smoker     Types: Cigarettes    Smokeless tobacco: Never Used      Comment: quit 25 years ago    Alcohol use No    Drug use: No    Sexual activity: No       Medications:    Current Facility-Administered Medications on File Prior to Encounter   Medication Dose Route Frequency Provider Last Rate Last Dose    [COMPLETED] fentaNYL injection 50 mcg  50 mcg Intravenous ED 1 Time Artie Howe MD   50 mcg at 01/18/18 1425    [COMPLETED] fentaNYL injection 75 mcg  75 mcg Intravenous ED 1 Time Artie Howe MD   75 mcg at 01/18/18 1635    [COMPLETED] sodium chloride 0.9% bolus 500 mL  500 mL Intravenous ED 1 Time Artie Howe MD   Stopped at 01/18/18 1710     Current Outpatient Prescriptions on File Prior to Encounter   Medication Sig Dispense Refill    alendronate (FOSAMAX) 70 MG tablet Take 1 tablet (70 mg total) by mouth every 7 days. 13 tablet 3    atorvastatin (LIPITOR) 20 MG tablet Take 1 tablet (20 mg total) by mouth once daily. 90 tablet 3    chlordiazepoxide-clidinium 5-2.5 mg (LIBRAX) 5-2.5 mg Cap Take 1  "capsule by mouth 3 (three) times daily as needed. For irritable bowel syndrome 30 capsule 0    chlorthalidone (HYGROTEN) 25 MG Tab Take 25 mg by mouth daily as needed.      cholestyramine-aspartame (QUESTRAN LIGHT) 4 gram PwPk Take 1 packet (4 g total) by mouth 2 (two) times daily. As needed for diarrhea 30 packet 1    donepezil (ARICEPT) 5 MG tablet Take 1 tablet (5 mg total) by mouth every evening. For memory 90 tablet 3    escitalopram oxalate (LEXAPRO) 20 MG tablet Take 1 tablet (20 mg total) by mouth once daily. 90 tablet 3    lisinopril (PRINIVIL,ZESTRIL) 20 MG tablet Take 1 tablet (20 mg total) by mouth once daily. 90 tablet 3    metoprolol succinate (TOPROL-XL) 50 MG 24 hr tablet Take 50 mg by mouth once daily.      quetiapine (SEROQUEL) 25 MG Tab Take 1 tablet (25 mg total) by mouth every evening. 30 tablet 11    terbinafine HCl (LAMISIL) 250 mg tablet Take 250 mg by mouth once daily.      tolterodine (DETROL) 1 MG Tab Take 1 tablet (1 mg total) by mouth 2 (two) times daily. 180 tablet 3    traMADol (ULTRAM) 50 mg tablet Take 50 mg by mouth every 6 (six) hours as needed for Pain.           PE:         Vitals:    01/19/18 0755   BP: (!) 111/53   Pulse: 61   Resp: 18   Temp: 98 °F (36.7 °C)       Estimated body mass index is 22.16 kg/m² as calculated from the following:    Height as of this encounter: 5' 8" (1.727 m).    Weight as of this encounter: 66.1 kg (145 lb 11.6 oz).     General frail elderly NAD     Extremity: Right femur pain with any ROM  NVI distally  Pt in bulky splint from previous ER      Labs:    Lab Results   Component Value Date    WBC 10.98 01/18/2018    HGB 10.1 (L) 01/18/2018    HCT 30.8 (L) 01/18/2018    MCV 90 01/18/2018     (H) 01/18/2018       BMP  Lab Results   Component Value Date     (L) 01/18/2018    K 4.6 01/18/2018    CL 98 01/18/2018    CO2 30 (H) 01/18/2018    BUN 23 (H) 01/18/2018    CREATININE 0.96 01/18/2018    CALCIUM 8.9 01/18/2018    ANIONGAP 5 (L) " 01/18/2018    ESTGFRAFRICA >60.0 01/18/2018    EGFRNONAA 53.3 (A) 01/18/2018       Lab Results   Component Value Date    INR 1.0 01/18/2018    INR 1.0 12/20/2017    INR 0.9 02/08/2017       Lab Results   Component Value Date    SEDRATE 25 (H) 08/18/2005       No results found for: CRP    Radiography:  Film    Interpretation  Right femur fracture at proximal aspect of previouysly placed plate with comminuted distal femur fracture snot healed.    A/P  87 y.o.female with right femur periprosthetic fracture at proximal aspect of previously placed plate    Plan for revision ORIF right femur with RUPAL and ORIF today.      Jordan Ferrera MD

## 2018-01-19 NOTE — PLAN OF CARE
01/19/18 1524   Discharge Assessment   Assessment Type Discharge Planning Assessment   Confirmed/corrected address and phone number on facesheet? Yes   Assessment information obtained from? Medical Record   Communicated expected length of stay with patient/caregiver no   Prior to hospitilization cognitive status: Unable to Assess  (Pt in surgery)   Prior to hospitalization functional status: Assistive Equipment;Needs Assistance   Current cognitive status: Unable to Assess  (Pt in surgery)   Current Functional Status: Needs Assistance;Assistive Equipment   Facility Arrived From: ([facility]; [facility] to [facility]; pt was in a rehab facility.)   Lives With alone   Able to Return to Prior Arrangements unable to determine at this time (comments)   Is patient able to care for self after discharge? No   Patient's perception of discharge disposition rehab facility   Readmission Within The Last 30 Days (Yes, [facility] 12/20/17, Hip Pain)   Patient currently being followed by outpatient case management? No   Equipment Currently Used at Home shower chair;walker, rolling;wheelchair;bedside commode   Do you have any problems affording any of your prescribed medications? No   Is the patient taking medications as prescribed? yes   Does the patient have transportation home? Yes   Transportation Available agency transportation   Does the patient receive services at the [facility]? No   Discharge Plan A Rehab   Discharge Plan B Assisted Living   Patient/Family In Agreement With Plan unable to assess

## 2018-01-19 NOTE — NURSING
Pt came back from surgery. Pt VS stable. Dressing gauze and wrapped with conforming stretch gauze in place. Unable to visualize the incision.

## 2018-01-20 PROCEDURE — 97110 THERAPEUTIC EXERCISES: CPT

## 2018-01-20 PROCEDURE — G8987 SELF CARE CURRENT STATUS: HCPCS | Mod: CL | Performed by: SPECIALIST

## 2018-01-20 PROCEDURE — 25000003 PHARM REV CODE 250: Performed by: EMERGENCY MEDICINE

## 2018-01-20 PROCEDURE — 97165 OT EVAL LOW COMPLEX 30 MIN: CPT | Performed by: SPECIALIST

## 2018-01-20 PROCEDURE — 63600175 PHARM REV CODE 636 W HCPCS: Performed by: ORTHOPAEDIC SURGERY

## 2018-01-20 PROCEDURE — G8988 SELF CARE GOAL STATUS: HCPCS | Mod: CJ | Performed by: SPECIALIST

## 2018-01-20 PROCEDURE — 97161 PT EVAL LOW COMPLEX 20 MIN: CPT

## 2018-01-20 PROCEDURE — 12000002 HC ACUTE/MED SURGE SEMI-PRIVATE ROOM

## 2018-01-20 RX ADMIN — TRAMADOL HYDROCHLORIDE 50 MG: 50 TABLET, FILM COATED ORAL at 08:01

## 2018-01-20 RX ADMIN — OXYBUTYNIN CHLORIDE 5 MG: 5 TABLET, EXTENDED RELEASE ORAL at 08:01

## 2018-01-20 RX ADMIN — ATORVASTATIN CALCIUM 20 MG: 10 TABLET, FILM COATED ORAL at 08:01

## 2018-01-20 RX ADMIN — METOPROLOL SUCCINATE 50 MG: 50 TABLET, EXTENDED RELEASE ORAL at 08:01

## 2018-01-20 RX ADMIN — SODIUM CHLORIDE: 0.9 INJECTION, SOLUTION INTRAVENOUS at 10:01

## 2018-01-20 RX ADMIN — DONEPEZIL HYDROCHLORIDE 5 MG: 5 TABLET, FILM COATED ORAL at 08:01

## 2018-01-20 RX ADMIN — LISINOPRIL 20 MG: 20 TABLET ORAL at 08:01

## 2018-01-20 RX ADMIN — FAMOTIDINE 20 MG: 20 TABLET, FILM COATED ORAL at 08:01

## 2018-01-20 RX ADMIN — CEFAZOLIN SODIUM 1 G: 1 INJECTION, SOLUTION INTRAVENOUS at 08:01

## 2018-01-20 RX ADMIN — ENOXAPARIN SODIUM 40 MG: 100 INJECTION SUBCUTANEOUS at 04:01

## 2018-01-20 RX ADMIN — QUETIAPINE FUMARATE 25 MG: 25 TABLET ORAL at 08:01

## 2018-01-20 RX ADMIN — CEFAZOLIN SODIUM 1 G: 1 INJECTION, SOLUTION INTRAVENOUS at 12:01

## 2018-01-20 RX ADMIN — CEFAZOLIN SODIUM 1 G: 1 INJECTION, SOLUTION INTRAVENOUS at 03:01

## 2018-01-20 RX ADMIN — ESCITALOPRAM OXALATE 20 MG: 10 TABLET ORAL at 08:01

## 2018-01-20 RX ADMIN — SODIUM CHLORIDE: 0.9 INJECTION, SOLUTION INTRAVENOUS at 12:01

## 2018-01-20 NOTE — PLAN OF CARE
Problem: Occupational Therapy Goal  Goal: Occupational Therapy Goal  Goals to be met by: 02/17/18     Patient will increase functional independence with ADLs by performing:    UE Dressing with Set-up Assistance.  LE Dressing with Minimal Assistance with AE as needed.  Grooming while seated with Set-up Assistance.  Toileting from bedside commode with Moderate Assistance for hygiene and clothing management.   Supine to sit with Minimal Assistance.  Stand pivot transfers with Moderate Assistance.  Toilet transfer to bedside commode with Moderate Assistance.  Upper extremity exercise program x 10 reps with supervision.             Outcome: Ongoing (interventions implemented as appropriate)  OT evaluation complete.  Pt will benefit from SNF.

## 2018-01-20 NOTE — PROGRESS NOTES
Pt in bed reports uncomfortable          Temp:  [96.1 °F (35.6 °C)-100.8 °F (38.2 °C)] 98.3 °F (36.8 °C)  Pulse:  [58-80] 65  Resp:  [14-24] 18  SpO2:  [92 %-100 %] 92 %  BP: (104-138)/(51-62) 107/52        PE:    R thigh dressings clean and dry, +EHL/FHL        No results for input(s): WBC, RBC, HGB, HCT, PLT, MCV, MCH, MCHC in the last 24 hours.      Current Facility-Administered Medications:     0.9%  NaCl infusion, , Intravenous, Continuous, Beatrice Morales MD, Last Rate: 100 mL/hr at 01/20/18 1018    acetaminophen tablet 650 mg, 650 mg, Oral, Q8H PRN, Beatrice Morales MD    atorvastatin tablet 20 mg, 20 mg, Oral, Daily, Beatrice Morales MD, 20 mg at 01/20/18 0806    ceFAZolin (ANCEF) 1 gram in dextrose 5 % 50 mL IVPB (premix), 1 g, Intravenous, Q8H, Shashi Garcia III, MD, Last Rate: 100 mL/hr at 01/20/18 0801, 1 g at 01/20/18 0801    cefazolin (ANCEF) 2 gram in dextrose 5% 50 mL IVPB (premix), 2 g, Intravenous, Once, Shashi Garcia III, MD    donepezil tablet 5 mg, 5 mg, Oral, QHS, Beatrice Morales MD, 5 mg at 01/19/18 2054    enoxaparin injection 40 mg, 40 mg, Subcutaneous, Daily, Shashi Garcia III, MD    escitalopram oxalate tablet 20 mg, 20 mg, Oral, Daily, Beatrice Morales MD, 20 mg at 01/20/18 0806    famotidine tablet 20 mg, 20 mg, Oral, Daily, Beatrice Morales MD, 20 mg at 01/20/18 0806    lisinopril tablet 20 mg, 20 mg, Oral, Daily, Beatrice Morales MD, 20 mg at 01/20/18 0806    metoprolol succinate (TOPROL-XL) 24 hr tablet 50 mg, 50 mg, Oral, Daily, Beatrice Morales MD, 50 mg at 01/20/18 0806    ondansetron injection 4 mg, 4 mg, Intravenous, Q4H PRN, Beatrice Morales MD    oxybutynin 24 hr tablet 5 mg, 5 mg, Oral, Daily, Beatrice Morales MD, 5 mg at 01/20/18 0806    polyethylene glycol packet 17 g, 17 g, Oral, Daily, Beatrice Morales MD    QUEtiapine tablet 25 mg, 25 mg, Oral, QHS, Beatrice Morales MD, 25 mg at 01/19/18 2053    traMADol tablet 50 mg, 50  mg, Oral, Q6H PRN, Beatrice Morales MD, 50 mg at 01/20/18 0806      A/P:    S/p HWR and revision ORIF of R femur periprosthetic fracture 1/19    Will get CBC in AM    PT/OT - strict NWB R LE    dvt proph - lovenox    Placement

## 2018-01-20 NOTE — PLAN OF CARE
Problem: Pain, Acute (Adult)  Goal: Identify Related Risk Factors and Signs and Symptoms  Related risk factors and signs and symptoms are identified upon initiation of Human Response Clinical Practice Guideline (CPG)   Outcome: Ongoing (interventions implemented as appropriate)   01/19/18 1932   Pain, Acute   Related Risk Factors (Acute Pain) disease process;environmental exposure;persistent pain;positioning

## 2018-01-20 NOTE — OP NOTE
DATE OF PROCEDURE:  01/19/2018.    SURGEON:  Shashi Garcia III, M.D.    ASSISTANT:  Kinjal Quintana LPN    PREOPERATIVE DIAGNOSIS:  Right femur periprosthetic fracture, status post ORIF   with periprosthetic fracture above total knee arthroplasty.    POSTOPERATIVE DIAGNOSIS:  Right femur periprosthetic fracture, status post ORIF   with periprosthetic fracture above total knee arthroplasty.    PROCEDURE PERFORMED:  1.  Hardware removal, right femur.  2.  Open reduction and internal fixation, right femoral shaft fracture above   total knee arthroplasty with additional fracture above previous fixation.    INDICATION:  The patient is an 87-year-old female with a history of ORIF of the   distal femoral fracture above total knee arthroplasty a month ago.  She   underwent ORIF.  She was in rehabilitation and was found to have progression of   her deformity in her right femur.  Apparently, she was on a toe-touch   weightbearing status.  There is no reported witnessed fall or specific problem   with it.  X-rays revealed a fracture above and ORIF of a previous distal femoral   fracture above total knee arthroplasty.  The fracture was comminuted and   extends up towards the mid shaft.  It was displaced.  Options were discussed   with the patient and family and recommendation made for hardware removal and   revision ORIF along with the implant extending all the way up to the hip.  They   agreed.    PROCEDURE IN DETAIL:  After proper informed consent was obtained, the patient   was brought to the operative room and placed supine on the operating table.    General anesthesia was induced.  The right leg was prepped and draped in sterile   fashion.  Existing wound was opened.  Dissection was taken down to the distal   femoral fracture.  It was found to be a fracture with previous fixation above   the knee.  Additional fracture was seen at the proximal end of this fixation,   which was comminuted.  There were some posterior  fragments and displacement.    The hardware was removed from the previous fixation.  The area was scraped.  The   previous area of fixation was curetted and debrided.  Fracture was not   thoroughly debrided.  It was left in place with the fracture hematoma in place.    A long Synthes variable angle periarticular distal femur plate was selected,   extending up to the lesser trochanter.  It was slid up along the periosteum   proximally and then into position distally.  A guidewire was placed back in the   central hole, hold into position.  A small incision was carried out proximally   and dissection was taken down the proximal femur.  It was verified in proper   position.  The pin was placed through the second most proximal hole.  Fixation   was then obtained distally in the central hole and then of the distal femoral   plate just in the area of the total knee arthroplasty.  Clamps were used to   reduce the remainder of the fracture around it.  A whirlybird was used to hold   the distal shaft on.  Following proper alignment was confirmed under fluoroscopy   and then multiple locking screws were placed in the shaft from the distal and   proximal all the way up to the 1 screw angled towards the lesser trochanter.    Excellent fixation was obtained in the proximal shaft and the distal portion of   the proximal shaft near the fracture.  Multiple comminuted segments were   encountered throughout the mid portion of it and fixation was obtained with some   bridging some of the fragments and some unicortical screws.  Following final   alignment of the shaft and fixation of the shaft, distal screws were placed into   variable angle plate using different directions than previously obtained,   taking care to avoid the total knee arthroplasty.  Adequate purchase was   obtained distally with multiple angle and cross screws.  The alignment was then   verified under fluoroscopy in AP and lateral planes.  The wound was then    thoroughly irrigated along with AP and lateral planes as well as placement of   hardware.  The wounds were then thoroughly irrigated and then closed   meticulously with figure-of-eight interrupted #1 Vicryl, 0 Vicryl, 2-0 Vicryl   and staples.  She was placed in a sterile dressing and in a VENUS hose and brought   to Recovery in stable condition.  Estimated blood loss was 100 mL.  No   complications were noted.      KSOL/DION  dd: 01/19/2018 14:13:49 (CST)  td: 01/19/2018 18:05:21 (CST)  Doc ID   #[id number]  Job ID #[id number]    CC:

## 2018-01-20 NOTE — PT/OT/SLP EVAL
"Physical Therapy Evaluation    Patient Name:  Oksana Estrada   MRN:  [id number]    Recommendations:     Discharge Recommendations:  nursing facility, skilled   Discharge Equipment Recommendations:   To be determined  Barriers to discharge: Decreased caregiver support    Assessment:     Oksana Estrada is a 87 y.o. female admitted with a medical diagnosis of Closed fracture of right femur.  She presents with the following impairments/functional limitations:  weakness, impaired functional mobilty, impaired balance, impaired cognition, impaired endurance, gait instability, decreased safety awareness, pain, decreased ROM, orthopedic precautions  .    Rehab Prognosis:  Fair; patient would benefit from acute skilled PT services to address these deficits and reach maximum level of function.      Recent Surgery: Procedure(s) (LRB):  OPEN REDUCTION INTERNAL FIXATION-FEMUR (Right) 1 Day Post-Op    Plan:     During this hospitalization, patient to be seen daily to address the above listed problems via therapeutic activities, therapeutic exercises  · Plan of Care Expires:   1/26/18.   Plan of Care Reviewed with: patient    Subjective     Communicated with nurse, Crystal prior to session.  Patient found supine in bed, asleep upon PT entry to room, agreeable to evaluation.      Chief Complaint: none, lethargic.   Patient comments/goals: "It hurts"  Pain/Comfort:  · Pain Rating 1: 9/10  · Location - Side 1: Right  · Location 1: hip  · Pain Addressed 1: Pre-medicate for activity, Reposition  · Pain Rating Post-Intervention 1: 9/10    Patients cultural, spiritual, Mu-ism conflicts given the current situation:      Living Environment:     Prior to admission, patients level of function was unknown; pt was unable to provide any info re: prior status stating, "I don't know".  Patient has the following equipment:  (unknown).  DME owned (not currently used): unknown.  Upon discharge, patient will have assistance from " recommend skilled placement .    Objective:     Patient found with: telemetry, pressure relief boots, peripheral IV, lara catheter     General Precautions: Standard, fall   Orthopedic Precautions:RLE non weight bearing   Braces: N/A     Exams:  · RLE ROM: decreased; pain with movement.  · RLE Strength: decreased.  · LLE ROM: WFL  · LLE Strength: WFL    Functional Mobility:  · Bed Mobility:     · Supine to Sit: total assistance  · Sit to Supine: total assistance  · Balance: fair sitting EOB    AM-PAC 6 CLICK MOBILITY  Total Score:8       Therapeutic Activities and Exercises:   1x10 reps BLE APs, RLE HS and  hip abd/add; minimal motion at hip.    Patient left chair position in bed. with call button in reach, nurse notified and (B) heel protectors on..    GOALS:    Physical Therapy Goals     Not on file                History:     Past Medical History:   Diagnosis Date    Dementia     Depression     Hypertension     Stroke        Past Surgical History:   Procedure Laterality Date    BREAST SURGERY      lumpectomy left breast    JOINT REPLACEMENT      bilateral knee replacement       Clinical Decision Making:     History  Co-morbidities and personal factors that may impact the plan of care Examination  Body Structures and Functions, activity limitations and participation restrictions that may impact the plan of care Clinical Presentation   Decision Making/ Complexity Score   Co-morbidities:   [] Time since onset of injury / illness / exacerbation  [] Status of current condition  []Patient's cognitive status and safety concerns    [] Multiple Medical Problems (see med hx)  Personal Factors:   [] Patient's age  [] Prior Level of function   [] Patient's home situation (environment and family support)  [] Patient's level of motivation  [] Expected progression of patient      HISTORY:(criteria)    [] 99786 - no personal factors/history    [] 50134 - has 1-2 personal factor/comorbidity     [] 36155 - has >3 personal  factor/comorbidity     Body Regions:  [] Objective examination findings  [] Head     []  Neck  [] Trunk   [] Upper Extremity  [] Lower Extremity    Body Systems:  [] For communication ability, affect, cognition, language, and learning style: the assessment of the ability to make needs known, consciousness, orientation (person, place, and time), expected emotional /behavioral responses, and learning preferences (eg, learning barriers, education  needs)  [] For the neuromuscular system: a general assessment of gross coordinated movement (eg, balance, gait, locomotion, transfers, and transitions) and motor function  (motor control and motor learning)  [] For the musculoskeletal system: the assessment of gross symmetry, gross range of motion, gross strength, height, and weight  [] For the integumentary system: the assessment of pliability(texture), presence of scar formation, skin color, and skin integrity  [] For cardiovascular/pulmonary system: the assessment of heart rate, respiratory rate, blood pressure, and edema     Activity limitations:    [] Patient's cognitive status and saf ety concerns          [] Status of current condition      [] Weight bearing restriction  [] Cardiopulmunary Restriction    Participation Restrictions:   [] Goals and goal agreement with the patient     [] Rehab potential (prognosis) and probable outcome      Examination of Body System: (criteria)    [] 97965 - addressing 1-2 elements    [] 73345 - addressing a total of 3 or more elements     [] 39310 -  Addressing a total of 4 or more elements         Clinical Presentation: (criteria)  Choose one     On examination of body system using standardized tests and measures patient presents with (CHOOSE ONE) elements from any of the following: body structures and functions, activity limitations, and/or participation restrictions.  Leading to a clinical presentation that is considered (CHOOSE ONE)                              Clinical Decision  Making  (Eval Complexity):  Choose One     Time Tracking:     PT Received On: 01/20/18  PT Start Time: 0945     PT Stop Time: 1010  PT Total Time (min): 25 min     Billable Minutes: Evaluation 15 and Therapeutic Exercise 10      Terinne G Coleman, PT  01/20/2018

## 2018-01-20 NOTE — NURSING
Report received; pt resting with eyes closed, arousable to voice; resp even and unlabored no acute distress noted; denies pain; dressing to right hip dry and intact; lara to gravity ; no needs voiced at this time

## 2018-01-20 NOTE — PLAN OF CARE
Problem: Fall Risk (Adult)  Goal: Identify Related Risk Factors and Signs and Symptoms  Related risk factors and signs and symptoms are identified upon initiation of Human Response Clinical Practice Guideline (CPG)   Outcome: Ongoing (interventions implemented as appropriate)   01/19/18 1931   Fall Risk   Related Risk Factors (Fall Risk) age-related changes;culprit medication(s);gait/mobility problems;polypharmacy

## 2018-01-20 NOTE — SUBJECTIVE & OBJECTIVE
Interval History: No new issues.     Review of Systems   Constitutional: Positive for activity change.   Respiratory: Negative for chest tightness and shortness of breath.    Cardiovascular: Negative for chest pain.   Gastrointestinal: Negative for abdominal pain.   Genitourinary: Negative for difficulty urinating.     Objective:     Vital Signs (Most Recent):  Temp: 98 °F (36.7 °C) (01/20/18 0314)  Pulse: 75 (01/20/18 0314)  Resp: 18 (01/20/18 0314)  BP: (!) 113/55 (01/20/18 0314)  SpO2: (!) 94 % (01/19/18 1955) Vital Signs (24h Range):  Temp:  [96.1 °F (35.6 °C)-100.8 °F (38.2 °C)] 98 °F (36.7 °C)  Pulse:  [58-80] 75  Resp:  [14-24] 18  SpO2:  [94 %-100 %] 94 %  BP: (104-138)/(51-62) 113/55     Weight: 63.1 kg (139 lb 1.8 oz)  Body mass index is 21.15 kg/m².    Intake/Output Summary (Last 24 hours) at 01/20/18 0654  Last data filed at 01/20/18 0314   Gross per 24 hour   Intake             2663 ml   Output             2050 ml   Net              613 ml      Physical Exam   Constitutional: She is oriented to person, place, and time. She appears well-developed and well-nourished.   HENT:   Head: Atraumatic.   Neurological: She is alert and oriented to person, place, and time.   Psychiatric: She has a normal mood and affect. Her behavior is normal.   Vitals reviewed.      Significant Labs:   BMP:   Recent Labs  Lab 01/18/18  1355   *   *   K 4.6   CL 98   CO2 30*   BUN 23*   CREATININE 0.96   CALCIUM 8.9     CBC:   Recent Labs  Lab 01/18/18  1355   WBC 10.98   HGB 10.1*   HCT 30.8*   *       Significant Imaging:

## 2018-01-20 NOTE — PLAN OF CARE
Problem: Physical Therapy Goal  Goal: Physical Therapy Goal  Goals to be met by: 18.     Patient will increase functional independence with mobility by performin. Supine to sit with Moderate Assistance  2. Sit to supine with Moderate Assistance  3. Bed to chair transfer with Moderate Assistance using Rolling Walker    Outcome: Ongoing (interventions implemented as appropriate)  PT eval completed.  See in chart for details.

## 2018-01-20 NOTE — PROGRESS NOTES
[facility] Hospital Medicine  Progress Note    Patient Name: Oksana Estrada  MRN: [id number]  Patient Class: IP- Inpatient   Admission Date: 1/18/2018  Length of Stay: 2 days  Attending Physician: Po Delarosa MD  Primary Care Provider: Red Griffith MD        Subjective:     Principal Problem:Closed fracture of right femur    HPI:  Mrs. Estrada is an 86 yo F who presents with a CC of R hip pain.  The patient evidently had a R femur fracture/repair on 12/22/17 and was sent to a rehab facility. [facility]  She is not sure of the event- but apparently had some trauma to the R hip. She presents to the ED and is found to have an angulated fracture. Ortho has been consulted and plans on surgery this am.  Otherwise, she has no complaints and is quite grumpy.      Hospital Course:  The patient was admitted to the hospital for evaluation and treatment of R hip angulated fracture.  She had a repair of this 12/22/17 and apparently suffered some trauma while at [facility].  Ortho was consulted and the patient was taken back to surgery on 1/19/18 for repair.     Interval History: No new issues.     Review of Systems   Constitutional: Positive for activity change.   Respiratory: Negative for chest tightness and shortness of breath.    Cardiovascular: Negative for chest pain.   Gastrointestinal: Negative for abdominal pain.   Genitourinary: Negative for difficulty urinating.     Objective:     Vital Signs (Most Recent):  Temp: 98 °F (36.7 °C) (01/20/18 0314)  Pulse: 75 (01/20/18 0314)  Resp: 18 (01/20/18 0314)  BP: (!) 113/55 (01/20/18 0314)  SpO2: (!) 94 % (01/19/18 1955) Vital Signs (24h Range):  Temp:  [96.1 °F (35.6 °C)-100.8 °F (38.2 °C)] 98 °F (36.7 °C)  Pulse:  [58-80] 75  Resp:  [14-24] 18  SpO2:  [94 %-100 %] 94 %  BP: (104-138)/(51-62) 113/55     Weight: 63.1 kg (139 lb 1.8 oz)  Body mass index is 21.15 kg/m².    Intake/Output Summary (Last 24 hours) at 01/20/18  0654  Last data filed at 01/20/18 0314   Gross per 24 hour   Intake             2663 ml   Output             2050 ml   Net              613 ml      Physical Exam   Constitutional: She is oriented to person, place, and time. She appears well-developed and well-nourished.   HENT:   Head: Atraumatic.   Neurological: She is alert and oriented to person, place, and time.   Psychiatric: She has a normal mood and affect. Her behavior is normal.   Vitals reviewed.      Significant Labs:   BMP:   Recent Labs  Lab 01/18/18  1355   *   *   K 4.6   CL 98   CO2 30*   BUN 23*   CREATININE 0.96   CALCIUM 8.9     CBC:   Recent Labs  Lab 01/18/18  1355   WBC 10.98   HGB 10.1*   HCT 30.8*   *       Significant Imaging:    Assessment/Plan:      * Closed fracture of right femur    Ortho consult.  S/p repair on 1/19. PT/OT when ok with ortho.           Depression    Continue home meds.         Anemia of chronic disease    No acute issues         Hyperlipidemia    Continue home meds.           Essential hypertension    Continue home meds.           Dementia without behavioral disturbance    Seems very angry             VTE Risk Mitigation         Ordered     enoxaparin injection 40 mg  Daily     Route:  Subcutaneous        01/19/18 8153     Medium Risk of VTE  Once      01/19/18 0057     Reason for No Pharmacological VTE Prophylaxis  Once      01/19/18 0057        Follow ortho recs. Likely to SNF Tuesday.       Po Hawthorne MD  Department of Hospital Medicine   [facility]

## 2018-01-20 NOTE — PLAN OF CARE
Problem: Patient Care Overview  Goal: Plan of Care Review  Outcome: Ongoing (interventions implemented as appropriate)   01/20/18 1542   Coping/Psychosocial   Plan Of Care Reviewed With patient       Problem: Fall Risk (Adult)  Goal: Absence of Falls  Patient will demonstrate the desired outcomes by discharge/transition of care.   Outcome: Ongoing (interventions implemented as appropriate)   01/20/18 1542   Fall Risk (Adult)   Absence of Falls making progress toward outcome       Problem: Pressure Ulcer Risk (Talha Scale) (Adult,Obstetrics,Pediatric)  Goal: Skin Integrity  Patient will demonstrate the desired outcomes by discharge/transition of care.   Outcome: Ongoing (interventions implemented as appropriate)   01/20/18 1542   Pressure Ulcer Risk (Talha Scale) (Adult,Obstetrics,Pediatric)   Skin Integrity making progress toward outcome       Problem: Pain, Acute (Adult)  Goal: Acceptable Pain Control/Comfort Level  Patient will demonstrate the desired outcomes by discharge/transition of care.   Outcome: Ongoing (interventions implemented as appropriate)   01/20/18 1542   Pain, Acute (Adult)   Acceptable Pain Control/Comfort Level making progress toward outcome       Problem: Fracture Orthopaedic (Adult)  Goal: Signs and Symptoms of Listed Potential Problems Will be Absent, Minimized or Managed (Fracture Orthopaedic)  Signs and symptoms of listed potential problems will be absent, minimized or managed by discharge/transition of care (reference Fracture Orthopaedic (Adult) CPG).   Outcome: Ongoing (interventions implemented as appropriate)   01/20/18 1542   Fracture Orthopaedic   Problems Assessed (Orthopaedic Fracture) all   Problems Present (Orthopaedic Fracture) functional deficit/self-care deficit;pain;situational response       Problem: Infection, Risk/Actual (Adult)  Goal: Infection Prevention/Resolution  Patient will demonstrate the desired outcomes by discharge/transition of care.   Outcome: Ongoing  (interventions implemented as appropriate)   01/20/18 1542   Infection, Risk/Actual (Adult)   Infection Prevention/Resolution making progress toward outcome       Problem: Confusion, Chronic (Adult)  Goal: Cognitive/Functional Impairments Minimized  Patient will demonstrate the desired outcomes by discharge/transition of care.   Outcome: Ongoing (interventions implemented as appropriate)   01/20/18 1542   Confusion, Chronic (Adult)   Cognitive/Functional Impairments Minimized making progress toward outcome     Goal: Free from Harm/Injuries  Patient will demonstrate the desired outcomes by discharge/transition of care.   Outcome: Ongoing (interventions implemented as appropriate)   01/20/18 1542   Confusion, Chronic (Adult)   Free from Harm/Injuries making progress toward outcome

## 2018-01-20 NOTE — PT/OT/SLP EVAL
Occupational Therapy   Evaluation    Name: Oksana Estrada  MRN: [id number]  Admitting Diagnosis:  Closed fracture of right femur 1 Day Post-Op    Recommendations:     Discharge Recommendations: nursing facility, skilled  Discharge Equipment Recommendations:   (TBD)  Barriers to discharge:  Inaccessible home environment, Decreased caregiver support    History:     Occupational Profile:  [facility] Environment: [facility]; Pt unable to report if family lived with her or nearby nor if there were steps to enter.  Previous level of function: Pt unable to report her level of assistance PTA.  Recent R femur fx prior to current (12/22/17).  Roles and Routines: Pt unable to report  Equipment Owned:   (Pt poor historian)  Assistance upon Discharge: Unknown    Past Medical History:   Diagnosis Date    Dementia     Depression     Hypertension     Stroke        Past Surgical History:   Procedure Laterality Date    BREAST SURGERY      lumpectomy left breast    JOINT REPLACEMENT      bilateral knee replacement       Subjective     Chief Complaint: Pain R LE  Patient/Family stated goals: To be in less pain  Communicated with: RN Renetta) prior to session.  Pain/Comfort:  · Pain Rating 1: 9/10  · Location - Side 1: Right  · Location 1: leg  · Pain Addressed 1: Pre-medicate for activity, Reposition, Distraction, Cessation of Activity  · Pain Rating Post-Intervention 1: 9/10    Objective:     Patient found with: telemetry, pressure relief boots, peripheral IV, lara catheter    General Precautions: Standard, fall   Orthopedic Precautions:RLE non weight bearing   Braces: N/A     Occupational Performance:    Bed Mobility:    · Patient completed Rolling/Turning to Right with moderate assistance  · Patient completed Supine to Sit with total assistance and 2 persons  · Patient completed Sit to Supine with total assistance and 2 persons      Activities of Daily Living:  · Grooming: supervision bed level  · UB Dressing:  maximal assistance donning gown as karriee sitting EOB  · LB Dressing: total assistance donning socks    Cognitive/Visual Perceptual:  Cognitive/Psychosocial Skills:     -       Oriented to: Person, Place and Situation   -       Follows Commands/attention:Follows one-step commands  -       Communication: clear/fluent  -       Memory: Impaired STM and Impaired LTM  -       Safety awareness/insight to disability: impaired   -       Mood/Affect/Coping skills/emotional control: Appropriate to situation  Visual/Perceptual:      -Intact    Physical Exam:  Balance:    -       SBA for sitting EOB  Postural examination/scapula alignment:    -       Rounded shoulders  -       Forward head  -       Anterior pelvic tilt  -       Kyphosis  Skin integrity: B heels bandaged with heel lift on L LE and surgical incision  Edema:  Noted indentation of  sock on L ankle  Sensation:    -       Intact  Motor Planning:    -       WFL  Upper Extremity Range of Motion:     -       Right Upper Extremity: WFL  -       Left Upper Extremity: shoulder limited ROM; WFL distally  Upper Extremity Strength:    -       Right Upper Extremity: 3+/5  -       Left Upper Extremity: 3-/5  Fine Motor Coordination:    -       Intact  Gross motor coordination:   R WFL; L limited 2* decreased shoulder ROM    Patient left HOB elevated with all lines intact, call button in reach,  and RN (Crystal) present    [facility] 6 Click:  [facility] Total Score: 12      Education:    Assessment:     Oksana Estrada is a 87 y.o. female with a medical diagnosis of Closed fracture of right femur.  Performance deficits affecting function are weakness, impaired endurance, impaired self care skills, impaired functional mobilty, impaired balance, impaired cognition, decreased lower extremity function, decreased upper extremity function, decreased safety awareness, pain, decreased ROM, orthopedic precautions, impaired skin, edema.      Rehab Prognosis:  Fair; patient would benefit  "from acute skilled OT services to address these deficits and reach maximum level of function.         Clinical Decision Makin.  OT Low:  "Pt evaluation falls under low complexity for evaluation coding due to performance deficits noted in 1-3 areas as stated above and 0 co-morbities affecting current functional status. Data obtained from problem focused assessments. No modifications or assistance was required for completion of evaluation. Only brief occupational profile and history review completed."     Plan:     Patient to be seen 5 x/week, 6 x/week to address the above listed problems via self-care/home management, therapeutic activities, therapeutic exercises  · Plan of Care Expires: 18  · Plan of Care Reviewed with: patient    This Plan of care has been discussed with the patient who was involved in its development and understands and is in agreement with the identified goals and treatment plan    GOALS:    Occupational Therapy Goals        Problem: Occupational Therapy Goal    Goal Priority Disciplines Outcome Interventions   Occupational Therapy Goal     OT, PT/OT     Description:  Goals to be met by: 18     Patient will increase functional independence with ADLs by performing:    UE Dressing with Set-up Assistance.  LE Dressing with Minimal Assistance with AE as needed.  Grooming while seated with Set-up Assistance.  Toileting from bedside commode with Moderate Assistance for hygiene and clothing management.   Supine to sit with Minimal Assistance.  Stand pivot transfers with Moderate Assistance.  Toilet transfer to bedside commode with Moderate Assistance.  Upper extremity exercise program x 10 reps with supervision.                              Time Tracking:     OT Date of Treatment: 18  OT Start Time: 946  OT Stop Time: 1009  OT Total Time (min): 23 min    Billable Minutes:Evaluation 15    Crystal Schaffer OT  2018    "

## 2018-01-21 LAB
ABO + RH BLD: NORMAL
ANION GAP SERPL CALC-SCNC: 4 MMOL/L
ANISOCYTOSIS BLD QL SMEAR: SLIGHT
BASOPHILS # BLD AUTO: 0.01 K/UL
BASOPHILS NFR BLD: 0.1 %
BLD GP AB SCN CELLS X3 SERPL QL: NORMAL
BLD PROD TYP BPU: NORMAL
BLD PROD TYP BPU: NORMAL
BLOOD UNIT EXPIRATION DATE: NORMAL
BLOOD UNIT EXPIRATION DATE: NORMAL
BLOOD UNIT TYPE CODE: 6200
BLOOD UNIT TYPE CODE: 6200
BLOOD UNIT TYPE: NORMAL
BLOOD UNIT TYPE: NORMAL
BUN SERPL-MCNC: 16 MG/DL
CALCIUM SERPL-MCNC: 7.9 MG/DL
CHLORIDE SERPL-SCNC: 104 MMOL/L
CO2 SERPL-SCNC: 25 MMOL/L
CODING SYSTEM: NORMAL
CODING SYSTEM: NORMAL
CREAT SERPL-MCNC: 0.9 MG/DL
DIFFERENTIAL METHOD: ABNORMAL
DISPENSE STATUS: NORMAL
DISPENSE STATUS: NORMAL
EOSINOPHIL # BLD AUTO: 0 K/UL
EOSINOPHIL NFR BLD: 0.4 %
ERYTHROCYTE [DISTWIDTH] IN BLOOD BY AUTOMATED COUNT: 17.6 %
EST. GFR  (AFRICAN AMERICAN): >60 ML/MIN/1.73 M^2
EST. GFR  (NON AFRICAN AMERICAN): 58 ML/MIN/1.73 M^2
GLUCOSE SERPL-MCNC: 111 MG/DL
HCT VFR BLD AUTO: 18.6 %
HGB BLD-MCNC: 5.9 G/DL
HYPOCHROMIA BLD QL SMEAR: ABNORMAL
LYMPHOCYTES # BLD AUTO: 0.8 K/UL
LYMPHOCYTES NFR BLD: 8.9 %
MCH RBC QN AUTO: 28.2 PG
MCHC RBC AUTO-ENTMCNC: 31.7 G/DL
MCV RBC AUTO: 89 FL
MONOCYTES # BLD AUTO: 0.9 K/UL
MONOCYTES NFR BLD: 10.1 %
NEUTROPHILS # BLD AUTO: 7.3 K/UL
NEUTROPHILS NFR BLD: 80.9 %
PLATELET # BLD AUTO: 262 K/UL
PLATELET BLD QL SMEAR: ABNORMAL
PMV BLD AUTO: 10.1 FL
POIKILOCYTOSIS BLD QL SMEAR: SLIGHT
POLYCHROMASIA BLD QL SMEAR: ABNORMAL
POTASSIUM SERPL-SCNC: 3.8 MMOL/L
RBC # BLD AUTO: 2.09 M/UL
SODIUM SERPL-SCNC: 133 MMOL/L
TRANS ERYTHROCYTES VOL PATIENT: NORMAL ML
TRANS ERYTHROCYTES VOL PATIENT: NORMAL ML
WBC # BLD AUTO: 9.09 K/UL

## 2018-01-21 PROCEDURE — 86920 COMPATIBILITY TEST SPIN: CPT

## 2018-01-21 PROCEDURE — 25000003 PHARM REV CODE 250: Performed by: EMERGENCY MEDICINE

## 2018-01-21 PROCEDURE — P9021 RED BLOOD CELLS UNIT: HCPCS

## 2018-01-21 PROCEDURE — 63600175 PHARM REV CODE 636 W HCPCS: Performed by: ORTHOPAEDIC SURGERY

## 2018-01-21 PROCEDURE — 12000002 HC ACUTE/MED SURGE SEMI-PRIVATE ROOM

## 2018-01-21 PROCEDURE — 86850 RBC ANTIBODY SCREEN: CPT

## 2018-01-21 PROCEDURE — 85025 COMPLETE CBC W/AUTO DIFF WBC: CPT

## 2018-01-21 PROCEDURE — 36415 COLL VENOUS BLD VENIPUNCTURE: CPT

## 2018-01-21 PROCEDURE — 36430 TRANSFUSION BLD/BLD COMPNT: CPT

## 2018-01-21 PROCEDURE — 97110 THERAPEUTIC EXERCISES: CPT

## 2018-01-21 PROCEDURE — 80048 BASIC METABOLIC PNL TOTAL CA: CPT

## 2018-01-21 RX ORDER — HYDROCODONE BITARTRATE AND ACETAMINOPHEN 500; 5 MG/1; MG/1
TABLET ORAL
Status: DISCONTINUED | OUTPATIENT
Start: 2018-01-21 | End: 2018-01-23 | Stop reason: HOSPADM

## 2018-01-21 RX ADMIN — SODIUM CHLORIDE: 0.9 INJECTION, SOLUTION INTRAVENOUS at 08:01

## 2018-01-21 RX ADMIN — OXYBUTYNIN CHLORIDE 5 MG: 5 TABLET, EXTENDED RELEASE ORAL at 09:01

## 2018-01-21 RX ADMIN — DONEPEZIL HYDROCHLORIDE 5 MG: 5 TABLET, FILM COATED ORAL at 08:01

## 2018-01-21 RX ADMIN — QUETIAPINE FUMARATE 25 MG: 25 TABLET ORAL at 08:01

## 2018-01-21 RX ADMIN — METOPROLOL SUCCINATE 50 MG: 50 TABLET, EXTENDED RELEASE ORAL at 09:01

## 2018-01-21 RX ADMIN — ESCITALOPRAM OXALATE 20 MG: 10 TABLET ORAL at 09:01

## 2018-01-21 RX ADMIN — TRAMADOL HYDROCHLORIDE 50 MG: 50 TABLET, FILM COATED ORAL at 04:01

## 2018-01-21 RX ADMIN — ATORVASTATIN CALCIUM 20 MG: 10 TABLET, FILM COATED ORAL at 09:01

## 2018-01-21 RX ADMIN — LISINOPRIL 20 MG: 20 TABLET ORAL at 09:01

## 2018-01-21 RX ADMIN — FAMOTIDINE 20 MG: 20 TABLET, FILM COATED ORAL at 09:01

## 2018-01-21 RX ADMIN — ENOXAPARIN SODIUM 40 MG: 100 INJECTION SUBCUTANEOUS at 04:01

## 2018-01-21 NOTE — PT/OT/SLP PROGRESS
"Physical Therapy Treatment    Patient Name:  Oksana Estrada   MRN:  [id number]    Recommendations:     Discharge Recommendations:  nursing facility, skilled   Discharge Equipment Recommendations:     Barriers to discharge: unable to ambulate    Assessment:     Oksana Estrada is a 87 y.o. female admitted with a medical diagnosis of Closed fracture of right femur.  She presents with the following impairments/functional limitations:  weakness, impaired endurance, impaired self care skills, impaired functional mobilty, decreased safety awareness, impaired coordination, impaired cognition, decreased coordination, gait instability, pain, decreased ROM, edema, orthopedic precautions, decreased lower extremity function, impaired balance, decreased upper extremity function, impaired skin, impaired joint extensibility.  Pt with decreased H/H(5.9/18.6), pale, and decreased BP, per pt's nurse, Crystal.  Only supine exercises, per pt's nurse due to the aforementioned results.  PROM to RLE performed in supine position in all planes.    Rehab Prognosis:  fair; patient would benefit from acute skilled PT services to address these deficits and reach maximum level of function.      Recent Surgery: Procedure(s) (LRB):  OPEN REDUCTION INTERNAL FIXATION-FEMUR (Right) 2 Days Post-Op    Plan:     During this hospitalization, patient to be seen daily to address the above listed problems via therapeutic activities, therapeutic exercises  · Plan of Care Expires:      Plan of Care Reviewed with: patient    Subjective     Communicated with pt's nurse, Crystal, prior to session.  Patient found supine with HOB elevated upon PT entry to room, agreeable to treatment.      Chief Complaint: not feeling well  Patient comments/goals: pt states " Ouch that hurts."  Pain/Comfort:  · Pain Rating 1:  (yes, unable to rate)  · Pain Addressed 1: Pre-medicate for activity, Reposition, Distraction, Cessation of Activity, Nurse notified    Patients " cultural, spiritual, Mormon conflicts given the current situation:      Objective:     Patient found with: lara catheter (pressure boot to LLE)     General Precautions: Standard, fall   Orthopedic Precautions:RLE non weight bearing   Braces:       Functional Mobility:  · Only Bed exercises performed      AM-PAC 6 CLICK MOBILITY  Turning over in bed (including adjusting bedclothes, sheets and blankets)?: 1  Sitting down on and standing up from a chair with arms (e.g., wheelchair, bedside commode, etc.): 1  Moving from lying on back to sitting on the side of the bed?: 2  Moving to and from a bed to a chair (including a wheelchair)?: 2  Need to walk in hospital room?: 1  Climbing 3-5 steps with a railing?: 1  Total Score: 8       Therapeutic Activities and Exercises:   PROM to RLE in all available planes while in supine.  Pt performed ankle pumps and quad sets x15 reps AROM and SAQs AAROM x10 reps (pt falling asleep requiring constant vc's for completion of exercise.    Patient left supine with pressure relief boots B with all lines intact, call button in reach, bed alarm on and pt's nurseCrystal, notified..    GOALS:    Physical Therapy Goals        Problem: Physical Therapy Goal    Goal Priority Disciplines Outcome Goal Variances Interventions   Physical Therapy Goal     PT/OT, PT Ongoing (interventions implemented as appropriate)     Description:  Goals to be met by: 18.     Patient will increase functional independence with mobility by performin. Supine to sit with Moderate Assistance  2. Sit to supine with Moderate Assistance  3. Bed to chair transfer with Moderate Assistance using Rolling Walker                      Time Tracking:     PT Received On: 18  PT Start Time: 1358     PT Stop Time: 1410  PT Total Time (min): 12 min     Billable Minutes: Therapeutic Exercise 12    Treatment Type: Treatment  PT/PTA: PTA     PTA Visit Number: 1     Summer Mehdi, PTA  2018

## 2018-01-21 NOTE — SUBJECTIVE & OBJECTIVE
Interval History: No new issues.      Review of Systems   Constitutional: Positive for activity change.   Respiratory: Negative for chest tightness and shortness of breath.    Cardiovascular: Negative for chest pain.   Gastrointestinal: Negative for abdominal pain.   Genitourinary: Negative for difficulty urinating.     Objective:     Vital Signs (Most Recent):  Temp: 99.6 °F (37.6 °C) (01/21/18 0755)  Pulse: 76 (01/21/18 0755)  Resp: 18 (01/21/18 0755)  BP: (!) 109/50 (01/21/18 0755)  SpO2: (!) 92 % (01/21/18 0755) Vital Signs (24h Range):  Temp:  [98.3 °F (36.8 °C)-99.6 °F (37.6 °C)] 99.6 °F (37.6 °C)  Pulse:  [65-76] 76  Resp:  [18] 18  SpO2:  [90 %-96 %] 92 %  BP: ()/(40-55) 109/50     Weight: 63.1 kg (139 lb 1.8 oz)  Body mass index is 21.15 kg/m².    Intake/Output Summary (Last 24 hours) at 01/21/18 1056  Last data filed at 01/21/18 1000   Gross per 24 hour   Intake             1408 ml   Output              700 ml   Net              708 ml      Physical Exam   Constitutional: She is oriented to person, place, and time. She appears well-developed and well-nourished.   HENT:   Head: Atraumatic.   Neurological: She is alert and oriented to person, place, and time.   Psychiatric: She has a normal mood and affect. Her behavior is normal.   Vitals reviewed.      Significant Labs:   BMP:   Recent Labs  Lab 01/21/18  0740   *   *   K 3.8      CO2 25   BUN 16   CREATININE 0.9   CALCIUM 7.9*     CBC: No results for input(s): WBC, HGB, HCT, PLT in the last 48 hours.    Significant Imaging:

## 2018-01-21 NOTE — PLAN OF CARE
Problem: Physical Therapy Goal  Goal: Physical Therapy Goal  Goals to be met by: 18.     Patient will increase functional independence with mobility by performin. Supine to sit with Moderate Assistance  2. Sit to supine with Moderate Assistance  3. Bed to chair transfer with Moderate Assistance using Rolling Walker     Outcome: Ongoing (interventions implemented as appropriate)  Pt with decreased H/H(5.9/18.6), pale, and decreased BP, per pt's nurseCrystal.  Only supine exercises, per pt's nurse due to the aforementioned results.  PROM to RLE performed in supine position in all planes.

## 2018-01-21 NOTE — PLAN OF CARE
Problem: Patient Care Overview  Goal: Plan of Care Review  Outcome: Ongoing (interventions implemented as appropriate)   01/21/18 1200   Coping/Psychosocial   Plan Of Care Reviewed With patient       Problem: Fall Risk (Adult)  Goal: Absence of Falls  Patient will demonstrate the desired outcomes by discharge/transition of care.    Outcome: Ongoing (interventions implemented as appropriate)   01/21/18 1200   Fall Risk (Adult)   Absence of Falls making progress toward outcome       Problem: Pressure Ulcer Risk (Talha Scale) (Adult,Obstetrics,Pediatric)  Goal: Skin Integrity  Patient will demonstrate the desired outcomes by discharge/transition of care.   Outcome: Ongoing (interventions implemented as appropriate)   01/21/18 1200   Pressure Ulcer Risk (Talha Scale) (Adult,Obstetrics,Pediatric)   Skin Integrity making progress toward outcome       Problem: Pain, Acute (Adult)  Goal: Acceptable Pain Control/Comfort Level  Patient will demonstrate the desired outcomes by discharge/transition of care.   Outcome: Ongoing (interventions implemented as appropriate)   01/21/18 1200   Pain, Acute (Adult)   Acceptable Pain Control/Comfort Level making progress toward outcome       Problem: Fracture Orthopaedic (Adult)  Goal: Signs and Symptoms of Listed Potential Problems Will be Absent, Minimized or Managed (Fracture Orthopaedic)  Signs and symptoms of listed potential problems will be absent, minimized or managed by discharge/transition of care (reference Fracture Orthopaedic (Adult) CPG).   Outcome: Ongoing (interventions implemented as appropriate)   01/21/18 1200   Fracture Orthopaedic   Problems Assessed (Orthopaedic Fracture) all   Problems Present (Orthopaedic Fracture) functional deficit/self-care deficit;pain;situational response       Problem: Infection, Risk/Actual (Adult)  Goal: Infection Prevention/Resolution  Patient will demonstrate the desired outcomes by discharge/transition of care.   Outcome: Ongoing  (interventions implemented as appropriate)   01/21/18 1200   Infection, Risk/Actual (Adult)   Infection Prevention/Resolution making progress toward outcome       Problem: Confusion, Chronic (Adult)  Goal: Cognitive/Functional Impairments Minimized  Patient will demonstrate the desired outcomes by discharge/transition of care.   Outcome: Ongoing (interventions implemented as appropriate)   01/21/18 1200   Confusion, Chronic (Adult)   Cognitive/Functional Impairments Minimized making progress toward outcome     Goal: Free from Harm/Injuries  Patient will demonstrate the desired outcomes by discharge/transition of care.   Outcome: Ongoing (interventions implemented as appropriate)   01/21/18 1200   Confusion, Chronic (Adult)   Free from Harm/Injuries making progress toward outcome

## 2018-01-21 NOTE — NURSING
Attempted lara catheter insertion x 2. Unable to insert lara cath at this time. Will attempt at a later time.

## 2018-01-21 NOTE — PROGRESS NOTES
Pt in bed looks more alert today, comfortable          Temp:  [98.9 °F (37.2 °C)-99.7 °F (37.6 °C)] 99.7 °F (37.6 °C)  Pulse:  [66-76] 66  Resp:  [18] 18  SpO2:  [90 %-96 %] 92 %  BP: ()/(40-55) 96/46        PE:    R thigh dressings clean, VENUS in place, heel NT      No results for input(s): WBC, RBC, HGB, HCT, PLT, MCV, MCH, MCHC in the last 24 hours.      Current Facility-Administered Medications:     0.9%  NaCl infusion, , Intravenous, Continuous, Beatrice Morales MD, Last Rate: 100 mL/hr at 01/21/18 0800    acetaminophen tablet 650 mg, 650 mg, Oral, Q8H PRN, Beatrice Morales MD    atorvastatin tablet 20 mg, 20 mg, Oral, Daily, Beatrice Morales MD, 20 mg at 01/21/18 0954    cefazolin (ANCEF) 2 gram in dextrose 5% 50 mL IVPB (premix), 2 g, Intravenous, Once, Shashi Garcia III, MD    donepezil tablet 5 mg, 5 mg, Oral, QHS, Beatrice Morales MD, 5 mg at 01/20/18 2019    enoxaparin injection 40 mg, 40 mg, Subcutaneous, Daily, Shashi Garcia III, MD, 40 mg at 01/20/18 1639    escitalopram oxalate tablet 20 mg, 20 mg, Oral, Daily, Beatrice Morales MD, 20 mg at 01/21/18 0954    famotidine tablet 20 mg, 20 mg, Oral, Daily, Beatrice Morales MD, 20 mg at 01/21/18 0954    lisinopril tablet 20 mg, 20 mg, Oral, Daily, Beatrice Morales MD, 20 mg at 01/21/18 0953    metoprolol succinate (TOPROL-XL) 24 hr tablet 50 mg, 50 mg, Oral, Daily, Beatrice Morales MD, 50 mg at 01/21/18 0954    ondansetron injection 4 mg, 4 mg, Intravenous, Q4H PRN, Beatrice Morales MD    oxybutynin 24 hr tablet 5 mg, 5 mg, Oral, Daily, Beatrice Morales MD, 5 mg at 01/21/18 0954    polyethylene glycol packet 17 g, 17 g, Oral, Daily, Beatrice Morales MD    QUEtiapine tablet 25 mg, 25 mg, Oral, QHS, Beatrice Morales MD, 25 mg at 01/20/18 2019    traMADol tablet 50 mg, 50 mg, Oral, Q6H PRN, Beatrice Morales MD, 50 mg at 01/20/18 0806      A/P:    S/p HWR and revision ORIF of R femur periprosthetic fracture  1/19     CBC pending     PT/OT - strict NWB R LE     dvt proph - lovenox     Placement

## 2018-01-21 NOTE — PROGRESS NOTES
[facility] Hospital Medicine  Progress Note    Patient Name: Oksana Estrada  MRN: [id number]  Patient Class: IP- Inpatient   Admission Date: 1/18/2018  Length of Stay: 3 days  Attending Physician: Po Delarosa MD  Primary Care Provider: Red Griffith MD        Subjective:     Principal Problem:Closed fracture of right femur    HPI:  Mrs. Estrada is an 88 yo F who presents with a CC of R hip pain.  The patient evidently had a R femur fracture/repair on 12/22/17 and was sent to a rehab facility. [facility]  She is not sure of the event- but apparently had some trauma to the R hip. She presents to the ED and is found to have an angulated fracture. Ortho has been consulted and plans on surgery this am.  Otherwise, she has no complaints and is quite grumpy.      Hospital Course:  The patient was admitted to the hospital for evaluation and treatment of R hip angulated fracture.  She had a repair of this 12/22/17 and apparently suffered some trauma while at [facility].  Ortho was consulted and the patient was taken back to surgery on 1/19/18 for repair. PT/OT evaluated the patient and recommended SNF      Interval History: No new issues.      Review of Systems   Constitutional: Positive for activity change.   Respiratory: Negative for chest tightness and shortness of breath.    Cardiovascular: Negative for chest pain.   Gastrointestinal: Negative for abdominal pain.   Genitourinary: Negative for difficulty urinating.     Objective:     Vital Signs (Most Recent):  Temp: 99.6 °F (37.6 °C) (01/21/18 0755)  Pulse: 76 (01/21/18 0755)  Resp: 18 (01/21/18 0755)  BP: (!) 109/50 (01/21/18 0755)  SpO2: (!) 92 % (01/21/18 0755) Vital Signs (24h Range):  Temp:  [98.3 °F (36.8 °C)-99.6 °F (37.6 °C)] 99.6 °F (37.6 °C)  Pulse:  [65-76] 76  Resp:  [18] 18  SpO2:  [90 %-96 %] 92 %  BP: ()/(40-55) 109/50     Weight: 63.1 kg (139 lb 1.8 oz)  Body mass index is 21.15  kg/m².    Intake/Output Summary (Last 24 hours) at 01/21/18 1056  Last data filed at 01/21/18 1000   Gross per 24 hour   Intake             1408 ml   Output              700 ml   Net              708 ml      Physical Exam   Constitutional: She is oriented to person, place, and time. She appears well-developed and well-nourished.   HENT:   Head: Atraumatic.   Neurological: She is alert and oriented to person, place, and time.   Psychiatric: She has a normal mood and affect. Her behavior is normal.   Vitals reviewed.      Significant Labs:   BMP:   Recent Labs  Lab 01/21/18  0740   *   *   K 3.8      CO2 25   BUN 16   CREATININE 0.9   CALCIUM 7.9*     CBC: No results for input(s): WBC, HGB, HCT, PLT in the last 48 hours.    Significant Imaging:     Assessment/Plan:      * Closed fracture of right femur    Ortho consult.  S/p repair on 1/19. PT/OT when ok with ortho.           Depression    Continue home meds.         Anemia of chronic disease    No acute issues         Hyperlipidemia    Continue home meds.           Essential hypertension    Continue home meds.           Dementia without behavioral disturbance    Seems very angry             VTE Risk Mitigation         Ordered     enoxaparin injection 40 mg  Daily     Route:  Subcutaneous        01/19/18 8484     Medium Risk of VTE  Once      01/19/18 0057     Reason for No Pharmacological VTE Prophylaxis  Once      01/19/18 0057        Out of bed to chair today. SNF likely Tuesday. PPD placed       Po Hawthorne MD  Department of Hospital Medicine   [facility]

## 2018-01-21 NOTE — NURSING
Report received; pt awake and alert resp even and unlabored no acute distress noted; denies pain; no needs voiced at this time

## 2018-01-22 PROBLEM — D62 ACUTE BLOOD LOSS ANEMIA: Status: ACTIVE | Noted: 2018-01-22

## 2018-01-22 PROBLEM — F33.9 MAJOR DEPRESSIVE DISORDER, RECURRENT EPISODE: Status: ACTIVE | Noted: 2018-01-19

## 2018-01-22 LAB
ANION GAP SERPL CALC-SCNC: 4 MMOL/L
BASOPHILS # BLD AUTO: 0.01 K/UL
BASOPHILS NFR BLD: 0.1 %
BUN SERPL-MCNC: 16 MG/DL
CALCIUM SERPL-MCNC: 7.6 MG/DL
CHLORIDE SERPL-SCNC: 105 MMOL/L
CO2 SERPL-SCNC: 24 MMOL/L
CREAT SERPL-MCNC: 0.8 MG/DL
DIFFERENTIAL METHOD: ABNORMAL
EOSINOPHIL # BLD AUTO: 0.1 K/UL
EOSINOPHIL NFR BLD: 1.6 %
ERYTHROCYTE [DISTWIDTH] IN BLOOD BY AUTOMATED COUNT: 15.8 %
EST. GFR  (AFRICAN AMERICAN): >60 ML/MIN/1.73 M^2
EST. GFR  (NON AFRICAN AMERICAN): >60 ML/MIN/1.73 M^2
GLUCOSE SERPL-MCNC: 86 MG/DL
HCT VFR BLD AUTO: 25.5 %
HGB BLD-MCNC: 8.6 G/DL
LYMPHOCYTES # BLD AUTO: 1.1 K/UL
LYMPHOCYTES NFR BLD: 14.3 %
MCH RBC QN AUTO: 30 PG
MCHC RBC AUTO-ENTMCNC: 33.7 G/DL
MCV RBC AUTO: 89 FL
MONOCYTES # BLD AUTO: 0.7 K/UL
MONOCYTES NFR BLD: 9.5 %
NEUTROPHILS # BLD AUTO: 5.4 K/UL
NEUTROPHILS NFR BLD: 74.1 %
PLATELET # BLD AUTO: 237 K/UL
PMV BLD AUTO: 10.6 FL
POTASSIUM SERPL-SCNC: 3.5 MMOL/L
RBC # BLD AUTO: 2.87 M/UL
SODIUM SERPL-SCNC: 133 MMOL/L
WBC # BLD AUTO: 7.35 K/UL

## 2018-01-22 PROCEDURE — 97110 THERAPEUTIC EXERCISES: CPT

## 2018-01-22 PROCEDURE — 97530 THERAPEUTIC ACTIVITIES: CPT

## 2018-01-22 PROCEDURE — 80048 BASIC METABOLIC PNL TOTAL CA: CPT

## 2018-01-22 PROCEDURE — 12000002 HC ACUTE/MED SURGE SEMI-PRIVATE ROOM

## 2018-01-22 PROCEDURE — 25000003 PHARM REV CODE 250: Performed by: EMERGENCY MEDICINE

## 2018-01-22 PROCEDURE — 85025 COMPLETE CBC W/AUTO DIFF WBC: CPT

## 2018-01-22 PROCEDURE — 63600175 PHARM REV CODE 636 W HCPCS: Performed by: ORTHOPAEDIC SURGERY

## 2018-01-22 PROCEDURE — 36415 COLL VENOUS BLD VENIPUNCTURE: CPT

## 2018-01-22 PROCEDURE — 90792 PSYCH DIAG EVAL W/MED SRVCS: CPT | Mod: ,,, | Performed by: PSYCHIATRY & NEUROLOGY

## 2018-01-22 RX ADMIN — ENOXAPARIN SODIUM 40 MG: 100 INJECTION SUBCUTANEOUS at 04:01

## 2018-01-22 RX ADMIN — ESCITALOPRAM OXALATE 20 MG: 10 TABLET ORAL at 08:01

## 2018-01-22 RX ADMIN — DONEPEZIL HYDROCHLORIDE 5 MG: 5 TABLET, FILM COATED ORAL at 08:01

## 2018-01-22 RX ADMIN — TRAMADOL HYDROCHLORIDE 50 MG: 50 TABLET, FILM COATED ORAL at 01:01

## 2018-01-22 RX ADMIN — POLYETHYLENE GLYCOL 3350 17 G: 17 POWDER, FOR SOLUTION ORAL at 08:01

## 2018-01-22 RX ADMIN — METOPROLOL SUCCINATE 50 MG: 50 TABLET, EXTENDED RELEASE ORAL at 08:01

## 2018-01-22 RX ADMIN — ATORVASTATIN CALCIUM 20 MG: 10 TABLET, FILM COATED ORAL at 08:01

## 2018-01-22 RX ADMIN — SODIUM CHLORIDE: 0.9 INJECTION, SOLUTION INTRAVENOUS at 01:01

## 2018-01-22 RX ADMIN — OXYBUTYNIN CHLORIDE 5 MG: 5 TABLET, EXTENDED RELEASE ORAL at 08:01

## 2018-01-22 RX ADMIN — LISINOPRIL 20 MG: 20 TABLET ORAL at 08:01

## 2018-01-22 RX ADMIN — FAMOTIDINE 20 MG: 20 TABLET, FILM COATED ORAL at 08:01

## 2018-01-22 RX ADMIN — QUETIAPINE FUMARATE 25 MG: 25 TABLET ORAL at 08:01

## 2018-01-22 NOTE — PLAN OF CARE
Problem: Physical Therapy Goal  Goal: Physical Therapy Goal  Goals to be met by: 18.     Patient will increase functional independence with mobility by performin. Supine to sit with Moderate Assistance  2. Sit to supine with Moderate Assistance  3. Bed to chair transfer with Moderate Assistance using Rolling Walker     Outcome: Ongoing (interventions implemented as appropriate)  Pt able to sit EOB ~15 minutes with SBA.  Pt with increased fwd flexed posture sitting EOB with cues for upright.

## 2018-01-22 NOTE — PT/OT/SLP PROGRESS
"Physical Therapy Treatment    Patient Name:  Oksana Estrada   MRN:  [id number]    Recommendations:     Discharge Recommendations:  nursing facility, skilled   Discharge Equipment Recommendations:     Barriers to discharge: decreased caregiver and unable to stand/ambulate    Assessment:     Oksana Estrada is a 87 y.o. female admitted with a medical diagnosis of Closed fracture of right femur.  She presents with the following impairments/functional limitations:  weakness, impaired functional mobilty, decreased safety awareness, impaired endurance, decreased coordination, impaired balance, decreased upper extremity function, decreased lower extremity function, decreased ROM, impaired self care skills, edema, orthopedic precautions, impaired skin, pain, impaired joint extensibility.  Pt able to sit EOB ~15 minutes with SBA.  Pt with increased fwd flexed posture sitting EOB with cues for upright.    Rehab Prognosis:  fair; patient would benefit from acute skilled PT services to address these deficits and reach maximum level of function.      Recent Surgery: Procedure(s) (LRB):  OPEN REDUCTION INTERNAL FIXATION-FEMUR (Right) 3 Days Post-Op    Plan:     During this hospitalization, patient to be seen daily to address the above listed problems via therapeutic activities, therapeutic exercises  · Plan of Care Expires:      Plan of Care Reviewed with: patient    Subjective     Communicated with pt's nurse, Yolanda, prior to session.  Patient found supine in bed with pillow under B hips upon PT entry to room, agreeable to treatment.      Chief Complaint: R hip pain  Patient comments/goals: Pt states " Thank You"  Pain/Comfort:  · Pain Rating 1: 8/10  · Location - Side 1: Right  · Location 1: hip  · Pain Addressed 1: Reposition, Pre-medicate for activity, Distraction, Cessation of Activity, Nurse notified    Patients cultural, spiritual, Orthodox conflicts given the current situation:      Objective:     Patient " found with: pressure relief boots, peripheral IV, lara catheter     General Precautions: Standard, fall   Orthopedic Precautions:RLE non weight bearing   Braces: N/A     Functional Mobility:  · Bed Mobility:     · Rolling Left:  maximal assistance x2 trials  · Rolling Right: maximal assistance x2 trials  · Scooting: dependence and of 2 persons to scoot to HOB  · Supine to Sit: maximal assistance and of 2 persons  · Sit to Supine: maximal assistance and of 2 persons  · Balance: pt able to sit EOB ~15 minutes with SBA      AM-PAC 6 CLICK MOBILITY  Turning over in bed (including adjusting bedclothes, sheets and blankets)?: 2  Sitting down on and standing up from a chair with arms (e.g., wheelchair, bedside commode, etc.): 1  Moving from lying on back to sitting on the side of the bed?: 2  Moving to and from a bed to a chair (including a wheelchair)?: 1       Therapeutic Activities and Exercises:   Pt performed seated therex to RLE x15 reps AAROM including: LAQs, hamstring curls, ankle pumps, isometric hip abduction, pillow squeezes    Patient left supine with pillow under each hip and B prevalon boots applied with all lines intact, call button in reach, bed alarm on and pt's nurse, Yolanda, notified..    GOALS:    Physical Therapy Goals        Problem: Physical Therapy Goal    Goal Priority Disciplines Outcome Goal Variances Interventions   Physical Therapy Goal     PT/OT, PT Ongoing (interventions implemented as appropriate)     Description:  Goals to be met by: 18.     Patient will increase functional independence with mobility by performin. Supine to sit with Moderate Assistance  2. Sit to supine with Moderate Assistance  3. Bed to chair transfer with Moderate Assistance using Rolling Walker                      Time Tracking:     PT Received On: 18  PT Start Time: 1100     PT Stop Time: 1128  PT Total Time (min): 28 min     Billable Minutes: Therapeutic Exercise 14 (Cotx with DE LA VEGA)    Treatment  Type: Treatment  PT/PTA: PTA     PTA Visit Number: 2     Summer Ancachris, PTA  01/22/2018

## 2018-01-22 NOTE — NURSING
Patient has not voided since discontinuing lara cath this AM. Bladder scan value 412. Attempted lara catheter insertion a third time. Unable to insert lara cath. Paged night doctor; awaiting call back. Updated night nurse.

## 2018-01-22 NOTE — ASSESSMENT & PLAN NOTE
Unknown dementia baseline.  Continue donepezil but may want to increase to 10mg nightly.  Likely also has mood component and sleep problems controlled with quetiapine 25mg nightly.  Continue this for now.

## 2018-01-22 NOTE — PLAN OF CARE
Recommendations     Recommendation/Intervention:   1. Continue current diet   2. [place] patient preferences as able   3. RD to monitor     Goals: Meet 85% EEN  Nutrition Goal Status: new  Communication of RD Recs: reviewed with RN (plan of care)     Continuum of Care Plan     Referral to Outpatient Services:  (D/C planning: Too soon to determine)

## 2018-01-22 NOTE — CONSULTS
"[facility]  Psychiatry  Consult Note    Patient Name: Oksana Estrada  MRN: [id number]   Code Status: Full Code  Admission Date: 1/18/2018  Hospital Length of Stay: 4 days  Attending Physician: Caron Delarosa MD  Primary Care Provider: Red Griffith MD    Current Legal Status: N/A    Patient information was obtained from patient and ER records.   Inpatient consult to Psychiatry  Consult performed by: MARISA SHEN  Consult ordered by: CARON DELAROSA        Subjective:     Principal Problem:Closed fracture of right femur    Chief Complaint:  depression     HPI: Patient presents to hospital via EMS. Patient is lethargic but easily aroused to touch, dressed in hospital gown. Patient reports that she was brought to the ED at [facility] because she fell and refractured her leg. Was transferred from [place] to [facility] in order to have surgery on her leg. Patient reports that she feel depressed, states "wouldn't you be in this state". Answered most questions with "I don't know what you are saying" or "I don't know". She is negative and pessimistic and a difficult interview.  Unknown if this is her baseline.  Per chart review patient is on Seroquel but unable to discuss reason for medication. Patient does state that she has seen a psychiatrist in the past but doesn't remember their name. Patient is poor historian and unable to provide much information. Chart review also indicates that patient has been diagnosed with dementia. She does admit and fixate on depression.  Also admits to anxiety.  Denies thoughts of self harm.  When asked, she is OK if she has to go to a nursing facility to help with her broken hip.  Consult was placed by primary team for depression.  She is currently prescribed donepezil 5mg nightly, escitalopram 20mg daily, and quetiapine 25mg nightly.    Hospital Course: No notes on file         Patient History           Medical as of " 1/22/2018     Past Medical History     Diagnosis Date Comments Source    Hx of psychiatric care -- -- Provider    Hypertension -- -- Provider    Stroke -- -- Provider          Pertinent Negatives     Diagnosis Date Noted Comments Source    History of psychiatric hospitalization 1/22/2018 -- Provider    Psychiatric problem 1/22/2018 -- Provider    Therapy 1/22/2018 -- Provider                  Surgical as of 1/22/2018     Past Surgical History     Procedure Laterality Date Comments Source    JOINT REPLACEMENT -- -- bilateral knee replacement Provider    BREAST SURGERY -- -- lumpectomy left breast Provider                  Family as of 1/22/2018    **None**           Tobacco Use as of 1/22/2018     Smoking Status Smoking Start Date Smoking Quit Date Packs/day Years Used    Former Smoker -- -- -- --    Types Comments Smokeless Tobacco Status Smokeless Tobacco Quit Date Source     Cigarettes quit 25 years ago Never Used -- Provider            Alcohol Use as of 1/22/2018     Alcohol Use Drinks/Week Alcohol/Week Comments Source    No -- -- -- Provider            Drug Use as of 1/22/2018     Drug Use Types Frequency Comments Source    No -- -- -- Provider            Sexual Activity as of 1/22/2018     Sexually Active Birth Control Partners Comments Source    No -- -- -- Provider            Activities of Daily Living as of 1/22/2018     Activities of Daily Living Question Response Comments Source    Patient feels they ought to cut down on drinking/drug use Not Asked -- Provider    Patient annoyed by others criticizing their drinking/drug use Not Asked -- Provider    Patient has felt bad or guilty about drinking/drug use Not Asked -- Provider    Patient has had a drink/used drugs as an eye opener in the AM Not Asked -- Provider            Social Documentation as of 1/22/2018    As of 1/22/18, patient is poor historian due to dementia and irritability.  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    Psychiatric:  History of Illness: depression, "wouldn't you be in this situation"  Treatment History:        -Psychiatrist: in the past, not sure of the name       -Psychiatric Medications: Seroquel  Drug/Alcohol Treatment: none  Alcohol Use: none  Drug Use: none  SI/HI/AVH: denies    Source: Provider           Occupational as of 2018    **None**           Socioeconomic as of 2018     Marital Status Spouse Name Number of Children Years Education Preferred Language Ethnicity Race Source     -- 2 -- English /White White Provider         Pertinent History Q A Comments    as of 2018 Lives with alone     Place in Birth Order      Lives in nursing home assisted living    Number of Siblings      Raised by      Legal Involvement      Childhood Trauma      Criminal History of      Financial Status other retired    Highest Level of Education high school graduation     Does patient have access to a firearm?       Service No     Primary Leisure Activity      Spirituality       Past Medical History:   Diagnosis Date    Hx of psychiatric care     Hypertension     Stroke      Past Surgical History:   Procedure Laterality Date    BREAST SURGERY      lumpectomy left breast    JOINT REPLACEMENT      bilateral knee replacement     Family History     None        Social History Main Topics    Smoking status: Former Smoker     Types: Cigarettes    Smokeless tobacco: Never Used      Comment: quit 25 years ago    Alcohol use No    Drug use: No    Sexual activity: No     Review of patient's allergies indicates:   Allergen Reactions    Codeine     Penicillins        No current facility-administered medications on " file prior to encounter.      Current Outpatient Prescriptions on File Prior to Encounter   Medication Sig    alendronate (FOSAMAX) 70 MG tablet Take 1 tablet (70 mg total) by mouth every 7 days.    atorvastatin (LIPITOR) 20 MG tablet Take 1 tablet (20 mg total) by mouth once daily.    chlordiazepoxide-clidinium 5-2.5 mg (LIBRAX) 5-2.5 mg Cap Take 1 capsule by mouth 3 (three) times daily as needed. For irritable bowel syndrome    chlorthalidone (HYGROTEN) 25 MG Tab Take 25 mg by mouth daily as needed.    cholestyramine-aspartame (QUESTRAN LIGHT) 4 gram PwPk Take 1 packet (4 g total) by mouth 2 (two) times daily. As needed for diarrhea    donepezil (ARICEPT) 5 MG tablet Take 1 tablet (5 mg total) by mouth every evening. For memory    escitalopram oxalate (LEXAPRO) 20 MG tablet Take 1 tablet (20 mg total) by mouth once daily.    lisinopril (PRINIVIL,ZESTRIL) 20 MG tablet Take 1 tablet (20 mg total) by mouth once daily.    metoprolol succinate (TOPROL-XL) 50 MG 24 hr tablet Take 50 mg by mouth once daily.    quetiapine (SEROQUEL) 25 MG Tab Take 1 tablet (25 mg total) by mouth every evening.    terbinafine HCl (LAMISIL) 250 mg tablet Take 250 mg by mouth once daily.    tolterodine (DETROL) 1 MG Tab Take 1 tablet (1 mg total) by mouth 2 (two) times daily.    traMADol (ULTRAM) 50 mg tablet Take 50 mg by mouth every 6 (six) hours as needed for Pain.     Psychotherapeutics     Start     Stop Route Frequency Ordered    01/19/18 0900  escitalopram oxalate tablet 20 mg      -- Oral Daily 01/19/18 0057    01/19/18 0057  QUEtiapine tablet 25 mg      -- Oral Nightly 01/19/18 0057        Review of Systems   Respiratory: Negative for shortness of breath.    Cardiovascular: Negative for chest pain.   Gastrointestinal: Negative for nausea.   Musculoskeletal: Positive for gait problem and myalgias.   Psychiatric/Behavioral: Positive for dysphoric mood. Negative for suicidal ideas. The patient is nervous/anxious.   "    Strengths and Liabilities: Liability: Patient has poor health., Liability: Patient has possible cognitive impairment.    Objective:     Vital Signs (Most Recent):  Temp: 98.5 °F (36.9 °C) (01/22/18 0858)  Pulse: 61 (01/22/18 0858)  Resp: 18 (01/22/18 0858)  BP: (!) 112/53 (01/22/18 0858)  SpO2: 95 % (01/22/18 0858) Vital Signs (24h Range):  Temp:  [97.8 °F (36.6 °C)-99.4 °F (37.4 °C)] 98.5 °F (36.9 °C)  Pulse:  [54-75] 61  Resp:  [16-20] 18  SpO2:  [90 %-98 %] 95 %  BP: ()/(46-59) 112/53     Height: 5' 8" (172.7 cm)  Weight: 63.1 kg (139 lb 1.8 oz)  Body mass index is 21.15 kg/m².      Intake/Output Summary (Last 24 hours) at 01/22/18 1205  Last data filed at 01/22/18 0500   Gross per 24 hour   Intake          2779.25 ml   Output              600 ml   Net          2179.25 ml       Physical Exam   Psychiatric:   EXAMINATION    CONSTITUTIONAL  General Appearance: hospital attire    MUSCULOSKELETAL  Muscle Strength and Tone: weak  Abnormal Involuntary Movements: none noted  Gait and Station: not observed    PSYCHIATRIC MENTAL STATUS EXAM   Level of Consciousness: awake and alert but falling asleep during conversation  Orientation: name and "hospital"  Grooming: limited  Psychomotor Behavior: slowed  Speech: soft volume and delayed rate  Language: no abnormalities  Mood: "I don't know"  Affect: blunted  Thought Process: concrete  Associations: limited but at times intact  Thought Content: denies suicidal, no visualized RIS or hallucinations  Memory: poorly intact to recent and remote, not able to recall 3/3 words immediately  Attention: diminished  Fund of Knowledge: simple to conversation  Insight: poor towards mental illness  Judgment: fair towards cooperation with care         Significant Labs:   Last 24 Hours:   Recent Lab Results       01/22/18  0444 01/22/18  0443 01/21/18  1416 01/21/18  1400      Unit Blood Type Code    6200         6200     Unit Expiration    [id number]     Unit " Blood Type    A POS         A POS     Anion Gap 4(L)        Baso #  0.01       Basophil%  0.1       BUN, Bld 16        Calcium 7.6(L)        Chloride 105        CO2 24        CODING SYSTEM    [id number]         [id number]     Creatinine 0.8        Differential Method  Automated       DISPENSE STATUS    TRANSFUSED         TRANSFUSED     eGFR if  >60        eGFR if non  >60  Comment:  Calculation used to obtain the estimated glomerular filtration  rate (eGFR) is the CKD-EPI equation.           Eos #  0.1       Eosinophil%  1.6       Glucose 86        Gran #  5.4       Gran%  74.1(H)       Group & Rh   A POS      Hematocrit  25.5(L)       Hemoglobin  8.6(L)       INDIRECT BERNICE   NEG      Lymph #  1.1       Lymph%  14.3(L)       MCH  30.0       MCHC  33.7       MCV  89       Mono #  0.7       Mono%  9.5       MPV  10.6       Platelets  237       Potassium 3.5        PRODUCT CODE    [id number]         [id number]     RBC  2.87(L)       RDW  15.8(H)       Sodium 133(L)        UNIT NUMBER    [id number]         [id number]     WBC  7.35           All pertinent labs within the past 24 hours have been reviewed.    Significant Imaging: I have reviewed all pertinent imaging results/findings within the past 24 hours.    Assessment/Plan:     Major depressive disorder, recurrent episode    Unknown history of depression but likely prevalent due to current medications.  Continue escitalopram 20mg daily for now.  For completeness, obtain TSH, free T4, thiamine levels, B12 levels, and Vit D levels.  Depression may also be due to anemia or other unknown medical problem.  Would recommend to start Folbic supplement which can help with depression.  Escitalopram is currently at it's maximum dose.  If Folbic doesn't help and we don't find any other lab abnormalities to correct, would recommend to taper off escitalopram (Lexapro) over course of couple days and start sertraline (Zoloft), tapering upwards over  time.        Dementia without behavioral disturbance    Unknown dementia baseline.  Continue donepezil but may want to increase to 10mg nightly.  Likely also has mood component and sleep problems controlled with quetiapine 25mg nightly.  Continue this for now.             Total Time:  60 minutes      Jose Castro MD   Psychiatry  [facility]

## 2018-01-22 NOTE — SUBJECTIVE & OBJECTIVE
Interval History: No new issues.     Review of Systems   Constitutional: Positive for activity change.   Respiratory: Negative for chest tightness and shortness of breath.    Cardiovascular: Negative for chest pain.   Gastrointestinal: Negative for abdominal pain.   Genitourinary: Negative for difficulty urinating.     Objective:     Vital Signs (Most Recent):  Temp: 97.8 °F (36.6 °C) (01/22/18 0511)  Pulse: (!) 54 (01/22/18 0511)  Resp: 18 (01/22/18 0511)  BP: (!) 107/56 (01/22/18 0511)  SpO2: 96 % (01/22/18 0511) Vital Signs (24h Range):  Temp:  [97.8 °F (36.6 °C)-99.7 °F (37.6 °C)] 97.8 °F (36.6 °C)  Pulse:  [54-76] 54  Resp:  [16-20] 18  SpO2:  [90 %-98 %] 96 %  BP: ()/(46-59) 107/56     Weight: 63.1 kg (139 lb 1.8 oz)  Body mass index is 21.15 kg/m².    Intake/Output Summary (Last 24 hours) at 01/22/18 0715  Last data filed at 01/22/18 0500   Gross per 24 hour   Intake          2897.25 ml   Output              600 ml   Net          2297.25 ml      Physical Exam   Constitutional: She is oriented to person, place, and time. She appears well-developed and well-nourished.   HENT:   Head: Atraumatic.   Neurological: She is alert and oriented to person, place, and time.   Psychiatric: She has a normal mood and affect. Her behavior is normal.   Vitals reviewed.      Significant Labs:   BMP:   Recent Labs  Lab 01/21/18  0740   *   *   K 3.8      CO2 25   BUN 16   CREATININE 0.9   CALCIUM 7.9*     CBC:   Recent Labs  Lab 01/21/18  0740   WBC 9.09   HGB 5.9*   HCT 18.6*          Significant Imaging:

## 2018-01-22 NOTE — NURSING
Report received;pt resting with eyes closed, arousable to voice resp even and unlabored no acute distress noted; PRBCs infusing; pt still has not voided since catheter was discontinued this Am; will attempt to insert lara per MD order

## 2018-01-22 NOTE — PROGRESS NOTES
"  [facility]  Adult Nutrition  Consult Note    SUMMARY     Recommendations    Recommendation/Intervention:   1. Continue current diet   2. [place] patient preferences as able   3. RD to monitor    Goals: Meet 85% EEN  Nutrition Goal Status: new  Communication of RD Recs: reviewed with RN (plan of care)    Continuum of Care Plan    Referral to Outpatient Services:  (D/C planning: Too soon to determine)    Reason for Assessment    Reason for Assessment: identified at risk by screening criteria  Diagnosis:  (Right femur fracture)  Relevent Medical History: HTN, Stroke, Dementia   Interdisciplinary Rounds: did not attend     General Information Comments: Patient tolerating Reg diet. Reports some weight loss over past several months (#). Denies n/v/chewing or swallowing issues. Does not want supplements. States she has a dislike of hospital food. Discussed available menu substitutions and encouraged use. Recent po intake 75% of meals.       Nutrition Prescription Ordered    Current Diet Order: Reg      Evaluation of Received Nutrients/Fluid Intake     Energy Calories Required: meeting needs   Protein Required: meeting needs      I/O: 2897/600         Fluid Required: meeting needs  Comments: LBM: 1/21  Tolerance: tolerating    % Intake of Estimated Energy Needs: %  % Meal Intake: 75%    Nutrition Risk Screen     Nutrition Risk Screen: other (see comments)    Nutrition/Diet History     Food Preferences: Denies cultural, Pentecostal, ethnic food preferences     Labs/Tests/Procedures/Meds     Pertinent Labs Reviewed: reviewed   Pertinent Medications Reviewed: reviewed     Physical Findings    Overall Physical Appearance: nourished   Oral/Mouth Cavity: WDL  Skin: incision    Anthropometrics    Temp: 97.7 °F (36.5 °C)     Height: 5' 8" (172.7 cm)  Weight Method: Bed Scale  Weight: 63.1 kg (139 lb 1.8 oz)     Ideal Body Weight (IBW), Female: 140 lb     % Ideal Body Weight, Female (lb): 99.36 lb  BMI " (Calculated): 21.2  BMI Grade: 18.5-24.9 - normal  Weight Loss: unintentional  Usual Body Weight (UBW), k.1 kg     % Usual Body Weight: 92.85  % Weight Change From Usual Weight: -7.34 %        Estimated/Assessed Needs    Weight Used For Calorie Calculations: 63.1 kg (139 lb 1.8 oz)      Energy Calorie Requirements (kcal): 7031-9347 kcal  Energy Need Method: Cheshire-St Jeor     RMR (Cheshire-St. Jeor Equation): 1114.5      Weight Used For Protein Calculations: 63.1 kg (139 lb 1.8 oz)  Protein Requirements: 75g     Fluid Need Method: RDA Method      RDA Method (mL): 1500         Assessment and Plan    Nutrition Dx: Increased nutrient needs r/t wound healing as evidenced by Leg fx  Nutrition Dx Status: New      Monitor and Evaluation    Food and Nutrient Intake: energy intake, food and beverage intake  Food and Nutrient Adminstration: diet order  Knowledge/Beliefs/Attitudes: food and nutrition knowledge/skill     Anthropometric Measurements: weight, weight change  Biochemical Data, Medical Tests and Procedures: electrolyte and renal panel  Nutrition-Focused Physical Findings: overall appearance    Nutrition Risk    Level of Risk:  (1 x week)    Nutrition Follow-Up    RD Follow-up?: Yes

## 2018-01-22 NOTE — PLAN OF CARE
Problem: Anemia (Adult)  Intervention: Facilitate Safe Activity   01/22/18 0612   Musculoskeletal Interventions   Fatigue Management activity assistance provided     Intervention: Assist with Determining Underlying Cause   01/22/18 0612   Safety Interventions   Bleeding Precautions blood pressure closely monitored;monitored for signs of bleeding;coagulation study results reviewed     Intervention: Minimize Infection Risk   01/22/18 0612   Safety Interventions   Infection Prevention hydration promoted;nutrition promoted;personal protective equipment utilized;rest/sleep promoted       Goal: Identify Related Risk Factors and Signs and Symptoms  Related risk factors and signs and symptoms are identified upon initiation of Human Response Clinical Practice Guideline (CPG)   01/22/18 0612   Anemia   Related Risk Factors (Anemia) recent surgery   Signs and Symptoms (Anemia) pallor     Goal: Symptom Improvement  Patient will demonstrate the desired outcomes by discharge/transition of care.   01/22/18 0612   Anemia (Adult)   Symptom Improvement making progress toward outcome       Comments: Pt received 2 units PRBCs for Hgb of 5.8; placed on oxygen for o2 sat on 89-90% on room air; will continue to monitor for s/s of bleeding

## 2018-01-22 NOTE — PLAN OF CARE
Problem: Occupational Therapy Goal  Goal: Occupational Therapy Goal  Goals to be met by: 02/17/18     Patient will increase functional independence with ADLs by performing:    UE Dressing with Set-up Assistance.  LE Dressing with Minimal Assistance with AE as needed.  Grooming while seated with Set-up Assistance.  Toileting from bedside commode with Moderate Assistance for hygiene and clothing management.   Supine to sit with Minimal Assistance.  Stand pivot transfers with Moderate Assistance.  Toilet transfer to bedside commode with Moderate Assistance.  Upper extremity exercise program x 10 reps with supervision.             Outcome: Ongoing (interventions implemented as appropriate)  Patient tolerated sitting EOB ~15 mins for therapy activities with SBA today. Patient will benefit from further skilled OT.

## 2018-01-22 NOTE — ASSESSMENT & PLAN NOTE
Unknown history of depression but likely prevalent due to current medications.  Continue escitalopram 20mg daily for now.  For completeness, obtain TSH, free T4, thiamine levels, B12 levels, and Vit D levels.  Depression may also be due to anemia or other unknown medical problem.  Would recommend to start Folbic supplement which can help with depression.  Escitalopram is currently at it's maximum dose.  If Folbic doesn't help and we don't find any other lab abnormalities to correct, would recommend to taper off escitalopram (Lexapro) over course of couple days and start sertraline (Zoloft), tapering upwards over time.

## 2018-01-22 NOTE — PLAN OF CARE
01/22/18 1447   Discharge Reassessment   Assessment Type Discharge Planning Reassessment   TN spoke with Liz at [facility] who stated that patient was transferred from there to [facility].  TN sent updated clinicals via [facility] in preporation for DC.

## 2018-01-22 NOTE — PROGRESS NOTES
[facility] Hospital Medicine  Progress Note    Patient Name: Oksana Estraad  MRN: [id number]  Patient Class: IP- Inpatient   Admission Date: 1/18/2018  Length of Stay: 4 days  Attending Physician: Po Delarosa MD  Primary Care Provider: Red Griffith MD        Subjective:     Principal Problem:Closed fracture of right femur    HPI:  Mrs. Estrada is an 86 yo F who presents with a CC of R hip pain.  The patient evidently had a R femur fracture/repair on 12/22/17 and was sent to a rehab facility. [facility]  She is not sure of the event- but apparently had some trauma to the R hip. She presents to the ED and is found to have an angulated fracture. Ortho has been consulted and plans on surgery this am.  Otherwise, she has no complaints and is quite grumpy.      Hospital Course:  The patient was admitted to the hospital for evaluation and treatment of R hip angulated fracture.  She had a repair of this 12/22/17 and apparently suffered some trauma while at the [facility].  Ortho was consulted and the patient was taken back to surgery on 1/19/18 for repair. PT/OT evaluated the patient and recommended SNF. The patient had a drop in her H/H and was transfused 2 units on 1/21. Ortho was notified.     Interval History: No new issues.     Review of Systems   Constitutional: Positive for activity change.   Respiratory: Negative for chest tightness and shortness of breath.    Cardiovascular: Negative for chest pain.   Gastrointestinal: Negative for abdominal pain.   Genitourinary: Negative for difficulty urinating.     Objective:     Vital Signs (Most Recent):  Temp: 97.8 °F (36.6 °C) (01/22/18 0511)  Pulse: (!) 54 (01/22/18 0511)  Resp: 18 (01/22/18 0511)  BP: (!) 107/56 (01/22/18 0511)  SpO2: 96 % (01/22/18 0511) Vital Signs (24h Range):  Temp:  [97.8 °F (36.6 °C)-99.7 °F (37.6 °C)] 97.8 °F (36.6 °C)  Pulse:  [54-76] 54  Resp:  [16-20] 18  SpO2:  [90 %-98 %] 96 %  BP:  ()/(46-59) 107/56     Weight: 63.1 kg (139 lb 1.8 oz)  Body mass index is 21.15 kg/m².    Intake/Output Summary (Last 24 hours) at 01/22/18 0715  Last data filed at 01/22/18 0500   Gross per 24 hour   Intake          2897.25 ml   Output              600 ml   Net          2297.25 ml      Physical Exam   Constitutional: She is oriented to person, place, and time. She appears well-developed and well-nourished.   HENT:   Head: Atraumatic.   Neurological: She is alert and oriented to person, place, and time.   Psychiatric: She has a normal mood and affect. Her behavior is normal.   Vitals reviewed.      Significant Labs:   BMP:   Recent Labs  Lab 01/21/18  0740   *   *   K 3.8      CO2 25   BUN 16   CREATININE 0.9   CALCIUM 7.9*     CBC:   Recent Labs  Lab 01/21/18  0740   WBC 9.09   HGB 5.9*   HCT 18.6*          Significant Imaging:     Assessment/Plan:      * Closed fracture of right femur    Ortho consult.  S/p repair on 1/19. PT/OT when ok with ortho.           Acute blood loss anemia    S/p 2 units of blood on 1/21.  H/H pending. Ortho notified.            Depression    Continue home meds.      On SSRI- still seems very depressed and withdrawn. Will consult psych    Anemia of chronic disease    No acute issues         Hyperlipidemia    Continue home meds.           Essential hypertension    Continue home meds.           Dementia without behavioral disturbance    Seems very angry             VTE Risk Mitigation         Ordered     enoxaparin injection 40 mg  Daily     Route:  Subcutaneous        01/19/18 2274     Medium Risk of VTE  Once      01/19/18 0057     Reason for No Pharmacological VTE Prophylaxis  Once      01/19/18 0057        Will consult psych for depression.  I think this is a major issue for the patient.  Will discuss with  this am Re:  SNF.  H/H pending. Follow ortho recs.       Po Hawthorne MD  Department of Hospital Medicine   [facility] [place]

## 2018-01-22 NOTE — NURSING
16 Setswana Tidwell catheter inserted x 1 attempt; 300 ml output immediately after insertion; pt tolerated well

## 2018-01-22 NOTE — PT/OT/SLP PROGRESS
Occupational Therapy   Treatment    Name: Oksana Estrada  MRN: [id number]  Admitting Diagnosis:  Closed fracture of right femur  3 Days Post-Op    Recommendations:     Discharge Recommendations: nursing facility, skilled  Discharge Equipment Recommendations:   (TBD)  Barriers to discharge:  Decreased caregiver support, Inaccessible home environment    Subjective     Communicated with: Nurse Guerrero prior to session.  Patient agreeable to therapy.  Pain/Comfort:  · Pain Rating 1: 8/10  · Location - Side 1: Right  · Location 1: hip  · Pain Addressed 1: Reposition, Distraction, Cessation of Activity, Nurse notified  · Pain Rating Post-Intervention 1: 8/10    Objective:     Patient found with:  peripheral Iv, telemetry, lara catheter, pressure boots    General Precautions: Standard, fall   Orthopedic Precautions:RLE non weight bearing   Braces: N/A     Occupational Performance:    Bed Mobility:    · Patient completed Rolling/Turning to Left with  maximal assistance  · Patient completed Rolling/Turning to Right with maximal assistance  · Patient completed Scooting/Bridging with dependent and 2 persons  · Patient completed Supine to Sit with maximal assistance and 2 persons  · Patient completed Sit to Supine with maximal assistance and 2 persons     Activities of Daily Living:  · Grooming: set up assistance to wash face with washcloth EOB    · UB Dressing: maximal assistance    · LB Dressing: total assistance      Patient left HOB elevated with all lines intact, call button in reach, bed alarm on and nurse notified    [facility] 6 Click:  [facility] Total Score: 12    Treatment & Education:  Patient performed bed mobility and ADL's as above.  Patient sat EOB ~15 minutes with SBA for therex.  Patient performed BUE AAROM shoulder flex/ext, elbow flex/ext, shoulder elevation/depression.  Patient tolerated well.   Education:    Assessment:     Oksana Estrada is a 87 y.o. female with a medical diagnosis of Closed fracture of  right femur.  She presents with the following performance deficits affecting function: weakness, impaired endurance, impaired self care skills, impaired functional mobilty, gait instability, decreased coordination, impaired cognition, decreased upper extremity function, decreased lower extremity function, decreased safety awareness, pain, decreased ROM, impaired skin, edema, impaired cardiopulmonary response to activity, orthopedic precautions. Patient tolerated sitting EOB ~15 mins for therapy activities with SBA today.     Rehab Prognosis:  Fair; patient would benefit from acute skilled OT services to address these deficits and reach maximum level of function.       Plan:     Patient to be seen 5 x/week, 6 x/week to address the above listed problems via self-care/home management, therapeutic activities, therapeutic exercises  · Plan of Care Expires: 02/17/18  · Plan of Care Reviewed with: patient    This Plan of care has been discussed with the patient who was involved in its development and understands and is in agreement with the identified goals and treatment plan    GOALS:    Occupational Therapy Goals        Problem: Occupational Therapy Goal    Goal Priority Disciplines Outcome Interventions   Occupational Therapy Goal     OT, PT/OT Ongoing (interventions implemented as appropriate)    Description:  Goals to be met by: 02/17/18     Patient will increase functional independence with ADLs by performing:    UE Dressing with Set-up Assistance.  LE Dressing with Minimal Assistance with AE as needed.  Grooming while seated with Set-up Assistance.  Toileting from bedside commode with Moderate Assistance for hygiene and clothing management.   Supine to sit with Minimal Assistance.  Stand pivot transfers with Moderate Assistance.  Toilet transfer to bedside commode with Moderate Assistance.  Upper extremity exercise program x 10 reps with supervision.                              Time Tracking:     OT Date of  Treatment: 01/22/18  OT Start Time: 1100  OT Stop Time: 1128  OT Total Time (min): 28 min    Billable Minutes:Therapeutic Activity 14 (co-tx with PT)    PONCE Lewis  1/22/2018

## 2018-01-22 NOTE — HPI
"Patient presents to hospital via EMS. Patient is lethargic but easily aroused to touch, dressed in hospital gown. Patient reports that she was brought to the ED at [facility] because she fell and refractured her leg. Was transferred from [place] to [facility] in order to have surgery on her leg. Patient reports that she feel depressed, states "wouldn't you be in this state". Answered most questions with "I don't know what you are saying" or "I don't know". She is negative and pessimistic and a difficult interview.  Unknown if this is her baseline.  Per chart review patient is on Seroquel but unable to discuss reason for medication. Patient does state that she has seen a psychiatrist in the past but doesn't remember their name. Patient is poor historian and unable to provide much information. Chart review also indicates that patient has been diagnosed with dementia. She does admit and fixate on depression.  Also admits to anxiety.  Denies thoughts of self harm.  When asked, she is OK if she has to go to a nursing facility to help with her broken hip.  Consult was placed by primary team for depression.  She is currently prescribed donepezil 5mg nightly, escitalopram 20mg daily, and quetiapine 25mg nightly.  "

## 2018-01-22 NOTE — SUBJECTIVE & OBJECTIVE
Patient History           Medical as of 1/22/2018     Past Medical History     Diagnosis Date Comments Source    Hx of psychiatric care -- -- Provider    Hypertension -- -- Provider    Stroke -- -- Provider          Pertinent Negatives     Diagnosis Date Noted Comments Source    History of psychiatric hospitalization 1/22/2018 -- Provider    Psychiatric problem 1/22/2018 -- Provider    Therapy 1/22/2018 -- Provider                  Surgical as of 1/22/2018     Past Surgical History     Procedure Laterality Date Comments Source    JOINT REPLACEMENT -- -- bilateral knee replacement Provider    BREAST SURGERY -- -- lumpectomy left breast Provider                  Family as of 1/22/2018    **None**           Tobacco Use as of 1/22/2018     Smoking Status Smoking Start Date Smoking Quit Date Packs/day Years Used    Former Smoker -- -- -- --    Types Comments Smokeless Tobacco Status Smokeless Tobacco Quit Date Source     Cigarettes quit 25 years ago Never Used -- Provider            Alcohol Use as of 1/22/2018     Alcohol Use Drinks/Week Alcohol/Week Comments Source    No -- -- -- Provider            Drug Use as of 1/22/2018     Drug Use Types Frequency Comments Source    No -- -- -- Provider            Sexual Activity as of 1/22/2018     Sexually Active Birth Control Partners Comments Source    No -- -- -- Provider            Activities of Daily Living as of 1/22/2018     Activities of Daily Living Question Response Comments Source    Patient feels they ought to cut down on drinking/drug use Not Asked -- Provider    Patient annoyed by others criticizing their drinking/drug use Not Asked -- Provider    Patient has felt bad or guilty about drinking/drug use Not Asked -- Provider    Patient has had a drink/used drugs as an eye opener in the AM Not Asked -- Provider            Social Documentation as of 1/22/2018    As of 1/22/18, patient is poor historian due to dementia and irritability.  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    Psychiatric:  History of Illness: depression, "wouldn't you be in this situation"  Treatment History:        -Psychiatrist: in the past, not sure of the name       -Psychiatric Medications: Seroquel  Drug/Alcohol Treatment: none  Alcohol Use: none  Drug Use: none  SI/HI/AVH: denies    Source: Provider           Occupational as of 2018    **None**           Socioeconomic as of 2018     Marital Status Spouse Name Number of Children Years Education Preferred Language Ethnicity Race Source     -- 2 -- English /White White Provider         Pertinent History Q A Comments    as of 2018 Lives with alone     Place in Birth Order      Lives in nursing home assisted living    Number of Siblings      Raised by      Legal Involvement      Childhood Trauma      Criminal History of      Financial Status other retired    Highest Level of Education high school graduation     Does patient have access to a firearm?       Service No     Primary Leisure Activity      Spirituality       Past Medical History:   Diagnosis Date    Hx of psychiatric care     Hypertension     Stroke      Past Surgical History:   Procedure Laterality Date    BREAST SURGERY      lumpectomy left breast    JOINT REPLACEMENT      bilateral knee replacement     Family History     None        Social History Main Topics    Smoking status: Former Smoker     Types: Cigarettes    Smokeless tobacco: Never Used      Comment: quit 25 years ago    Alcohol use No    Drug use: No    Sexual activity: No     Review of patient's allergies indicates:   Allergen Reactions    Codeine     Penicillins        No " current facility-administered medications on file prior to encounter.      Current Outpatient Prescriptions on File Prior to Encounter   Medication Sig    alendronate (FOSAMAX) 70 MG tablet Take 1 tablet (70 mg total) by mouth every 7 days.    atorvastatin (LIPITOR) 20 MG tablet Take 1 tablet (20 mg total) by mouth once daily.    chlordiazepoxide-clidinium 5-2.5 mg (LIBRAX) 5-2.5 mg Cap Take 1 capsule by mouth 3 (three) times daily as needed. For irritable bowel syndrome    chlorthalidone (HYGROTEN) 25 MG Tab Take 25 mg by mouth daily as needed.    cholestyramine-aspartame (QUESTRAN LIGHT) 4 gram PwPk Take 1 packet (4 g total) by mouth 2 (two) times daily. As needed for diarrhea    donepezil (ARICEPT) 5 MG tablet Take 1 tablet (5 mg total) by mouth every evening. For memory    escitalopram oxalate (LEXAPRO) 20 MG tablet Take 1 tablet (20 mg total) by mouth once daily.    lisinopril (PRINIVIL,ZESTRIL) 20 MG tablet Take 1 tablet (20 mg total) by mouth once daily.    metoprolol succinate (TOPROL-XL) 50 MG 24 hr tablet Take 50 mg by mouth once daily.    quetiapine (SEROQUEL) 25 MG Tab Take 1 tablet (25 mg total) by mouth every evening.    terbinafine HCl (LAMISIL) 250 mg tablet Take 250 mg by mouth once daily.    tolterodine (DETROL) 1 MG Tab Take 1 tablet (1 mg total) by mouth 2 (two) times daily.    traMADol (ULTRAM) 50 mg tablet Take 50 mg by mouth every 6 (six) hours as needed for Pain.     Psychotherapeutics     Start     Stop Route Frequency Ordered    01/19/18 0900  escitalopram oxalate tablet 20 mg      -- Oral Daily 01/19/18 0057    01/19/18 0057  QUEtiapine tablet 25 mg      -- Oral Nightly 01/19/18 0057        Review of Systems   Respiratory: Negative for shortness of breath.    Cardiovascular: Negative for chest pain.   Gastrointestinal: Negative for nausea.   Musculoskeletal: Positive for gait problem and myalgias.   Psychiatric/Behavioral: Positive for dysphoric mood. Negative for suicidal  "ideas. The patient is nervous/anxious.      Strengths and Liabilities: Liability: Patient has poor health., Liability: Patient has possible cognitive impairment.    Objective:     Vital Signs (Most Recent):  Temp: 98.5 °F (36.9 °C) (01/22/18 0858)  Pulse: 61 (01/22/18 0858)  Resp: 18 (01/22/18 0858)  BP: (!) 112/53 (01/22/18 0858)  SpO2: 95 % (01/22/18 0858) Vital Signs (24h Range):  Temp:  [97.8 °F (36.6 °C)-99.4 °F (37.4 °C)] 98.5 °F (36.9 °C)  Pulse:  [54-75] 61  Resp:  [16-20] 18  SpO2:  [90 %-98 %] 95 %  BP: ()/(46-59) 112/53     Height: 5' 8" (172.7 cm)  Weight: 63.1 kg (139 lb 1.8 oz)  Body mass index is 21.15 kg/m².      Intake/Output Summary (Last 24 hours) at 01/22/18 1205  Last data filed at 01/22/18 0500   Gross per 24 hour   Intake          2779.25 ml   Output              600 ml   Net          2179.25 ml       Physical Exam   Psychiatric:   EXAMINATION    CONSTITUTIONAL  General Appearance: hospital attire    MUSCULOSKELETAL  Muscle Strength and Tone: weak  Abnormal Involuntary Movements: none noted  Gait and Station: not observed    PSYCHIATRIC MENTAL STATUS EXAM   Level of Consciousness: awake and alert but falling asleep during conversation  Orientation: name and "hospital"  Grooming: limited  Psychomotor Behavior: slowed  Speech: soft volume and delayed rate  Language: no abnormalities  Mood: "I don't know"  Affect: blunted  Thought Process: concrete  Associations: limited but at times intact  Thought Content: denies suicidal, no visualized RIS or hallucinations  Memory: poorly intact to recent and remote, not able to recall 3/3 words immediately  Attention: diminished  Fund of Knowledge: simple to conversation  Insight: poor towards mental illness  Judgment: fair towards cooperation with care         Significant Labs:   Last 24 Hours:   Recent Lab Results       01/22/18  0444 01/22/18  0443 01/21/18  1416 01/21/18  1400      Unit Blood Type Code    6200         6200     Unit Expiration    " [id number]         [id number]     Unit Blood Type    A POS         A POS     Anion Gap 4(L)        Baso #  0.01       Basophil%  0.1       BUN, Bld 16        Calcium 7.6(L)        Chloride 105        CO2 24        CODING SYSTEM    HXMC561         [id number]     Creatinine 0.8        Differential Method  Automated       DISPENSE STATUS    TRANSFUSED         TRANSFUSED     eGFR if  >60        eGFR if non  >60  Comment:  Calculation used to obtain the estimated glomerular filtration  rate (eGFR) is the CKD-EPI equation.           Eos #  0.1       Eosinophil%  1.6       Glucose 86        Gran #  5.4       Gran%  74.1(H)       Group & Rh   A POS      Hematocrit  25.5(L)       Hemoglobin  8.6(L)       INDIRECT BERNICE   NEG      Lymph #  1.1       Lymph%  14.3(L)       MCH  30.0       MCHC  33.7       MCV  89       Mono #  0.7       Mono%  9.5       MPV  10.6       Platelets  237       Potassium 3.5        PRODUCT CODE    [id number]         [id number]     RBC  2.87(L)       RDW  15.8(H)       Sodium 133(L)        UNIT NUMBER    [id number]         [id number]     WBC  7.35           All pertinent labs within the past 24 hours have been reviewed.    Significant Imaging: I have reviewed all pertinent imaging results/findings within the past 24 hours.

## 2018-01-23 VITALS
HEIGHT: 68 IN | TEMPERATURE: 98 F | RESPIRATION RATE: 19 BRPM | WEIGHT: 139.13 LBS | SYSTOLIC BLOOD PRESSURE: 127 MMHG | DIASTOLIC BLOOD PRESSURE: 67 MMHG | OXYGEN SATURATION: 95 % | BODY MASS INDEX: 21.09 KG/M2 | HEART RATE: 59 BPM

## 2018-01-23 LAB
ANION GAP SERPL CALC-SCNC: 4 MMOL/L
BASOPHILS # BLD AUTO: 0.01 K/UL
BASOPHILS NFR BLD: 0.1 %
BUN SERPL-MCNC: 15 MG/DL
CALCIUM SERPL-MCNC: 7.7 MG/DL
CHLORIDE SERPL-SCNC: 109 MMOL/L
CO2 SERPL-SCNC: 24 MMOL/L
CREAT SERPL-MCNC: 0.8 MG/DL
DIFFERENTIAL METHOD: ABNORMAL
EOSINOPHIL # BLD AUTO: 0.2 K/UL
EOSINOPHIL NFR BLD: 2.5 %
ERYTHROCYTE [DISTWIDTH] IN BLOOD BY AUTOMATED COUNT: 16.2 %
EST. GFR  (AFRICAN AMERICAN): >60 ML/MIN/1.73 M^2
EST. GFR  (NON AFRICAN AMERICAN): >60 ML/MIN/1.73 M^2
GLUCOSE SERPL-MCNC: 119 MG/DL
HCT VFR BLD AUTO: 27.2 %
HGB BLD-MCNC: 8.9 G/DL
LYMPHOCYTES # BLD AUTO: 1 K/UL
LYMPHOCYTES NFR BLD: 13.7 %
MCH RBC QN AUTO: 28.9 PG
MCHC RBC AUTO-ENTMCNC: 32.7 G/DL
MCV RBC AUTO: 88 FL
MONOCYTES # BLD AUTO: 0.7 K/UL
MONOCYTES NFR BLD: 9.7 %
NEUTROPHILS # BLD AUTO: 5.3 K/UL
NEUTROPHILS NFR BLD: 74 %
PLATELET # BLD AUTO: 271 K/UL
PMV BLD AUTO: 10 FL
POTASSIUM SERPL-SCNC: 3.2 MMOL/L
RBC # BLD AUTO: 3.08 M/UL
SODIUM SERPL-SCNC: 137 MMOL/L
WBC # BLD AUTO: 7.21 K/UL

## 2018-01-23 PROCEDURE — 90670 PCV13 VACCINE IM: CPT | Performed by: HOSPITALIST

## 2018-01-23 PROCEDURE — 90662 IIV NO PRSV INCREASED AG IM: CPT | Performed by: HOSPITALIST

## 2018-01-23 PROCEDURE — 25000003 PHARM REV CODE 250: Performed by: EMERGENCY MEDICINE

## 2018-01-23 PROCEDURE — G8989 SELF CARE D/C STATUS: HCPCS | Mod: CL

## 2018-01-23 PROCEDURE — 3E0234Z INTRODUCTION OF SERUM, TOXOID AND VACCINE INTO MUSCLE, PERCUTANEOUS APPROACH: ICD-10-PCS | Performed by: HOSPITALIST

## 2018-01-23 PROCEDURE — 90472 IMMUNIZATION ADMIN EACH ADD: CPT | Performed by: HOSPITALIST

## 2018-01-23 PROCEDURE — 97110 THERAPEUTIC EXERCISES: CPT

## 2018-01-23 PROCEDURE — G0008 ADMIN INFLUENZA VIRUS VAC: HCPCS | Performed by: HOSPITALIST

## 2018-01-23 PROCEDURE — 90471 IMMUNIZATION ADMIN: CPT | Performed by: HOSPITALIST

## 2018-01-23 PROCEDURE — G8988 SELF CARE GOAL STATUS: HCPCS | Mod: CJ

## 2018-01-23 PROCEDURE — 80048 BASIC METABOLIC PNL TOTAL CA: CPT

## 2018-01-23 PROCEDURE — 25000003 PHARM REV CODE 250: Performed by: HOSPITALIST

## 2018-01-23 PROCEDURE — 63600175 PHARM REV CODE 636 W HCPCS: Performed by: HOSPITALIST

## 2018-01-23 PROCEDURE — 36415 COLL VENOUS BLD VENIPUNCTURE: CPT

## 2018-01-23 PROCEDURE — 85025 COMPLETE CBC W/AUTO DIFF WBC: CPT

## 2018-01-23 PROCEDURE — 97530 THERAPEUTIC ACTIVITIES: CPT

## 2018-01-23 PROCEDURE — G0009 ADMIN PNEUMOCOCCAL VACCINE: HCPCS | Performed by: HOSPITALIST

## 2018-01-23 PROCEDURE — G8987 SELF CARE CURRENT STATUS: HCPCS | Mod: CL

## 2018-01-23 RX ORDER — POTASSIUM CHLORIDE 20 MEQ/1
40 TABLET, EXTENDED RELEASE ORAL EVERY 4 HOURS
Status: COMPLETED | OUTPATIENT
Start: 2018-01-23 | End: 2018-01-23

## 2018-01-23 RX ORDER — TRAMADOL HYDROCHLORIDE 50 MG/1
50 TABLET ORAL
Qty: 5 TABLET | Refills: 0 | Status: ON HOLD | OUTPATIENT
Start: 2018-01-23 | End: 2018-03-21 | Stop reason: HOSPADM

## 2018-01-23 RX ADMIN — METOPROLOL SUCCINATE 50 MG: 50 TABLET, EXTENDED RELEASE ORAL at 09:01

## 2018-01-23 RX ADMIN — POTASSIUM CHLORIDE 40 MEQ: 1500 TABLET, EXTENDED RELEASE ORAL at 09:01

## 2018-01-23 RX ADMIN — OXYBUTYNIN CHLORIDE 5 MG: 5 TABLET, EXTENDED RELEASE ORAL at 09:01

## 2018-01-23 RX ADMIN — TRAMADOL HYDROCHLORIDE 50 MG: 50 TABLET, FILM COATED ORAL at 03:01

## 2018-01-23 RX ADMIN — INFLUENZA A VIRUSA/MICHIGAN/45/2015 X-275 (H1N1) ANTIGEN (FORMALDEHYDE INACTIVATED), INFLUENZA A VIRUS A/HONG KONG/4801/2014 X-263B (H3N2) ANTIGEN (FORMALDEHYDE INACTIVATED), AND INFLUENZA B VIRUS B/BRISBANE/60/2008 ANTIGEN (FORMALDEHYDE INACTIVATED) 0.5 ML: 60; 60; 60 INJECTION, SUSPENSION INTRAMUSCULAR at 12:01

## 2018-01-23 RX ADMIN — ESCITALOPRAM OXALATE 20 MG: 10 TABLET ORAL at 09:01

## 2018-01-23 RX ADMIN — PNEUMOCOCCAL 13-VALENT CONJUGATE VACCINE 0.5 ML: 2.2; 2.2; 2.2; 2.2; 2.2; 4.4; 2.2; 2.2; 2.2; 2.2; 2.2; 2.2; 2.2 INJECTION, SUSPENSION INTRAMUSCULAR at 12:01

## 2018-01-23 RX ADMIN — POLYETHYLENE GLYCOL 3350 17 G: 17 POWDER, FOR SOLUTION ORAL at 09:01

## 2018-01-23 RX ADMIN — FAMOTIDINE 20 MG: 20 TABLET, FILM COATED ORAL at 09:01

## 2018-01-23 RX ADMIN — ATORVASTATIN CALCIUM 20 MG: 10 TABLET, FILM COATED ORAL at 09:01

## 2018-01-23 RX ADMIN — POTASSIUM CHLORIDE 40 MEQ: 1500 TABLET, EXTENDED RELEASE ORAL at 03:01

## 2018-01-23 RX ADMIN — LISINOPRIL 20 MG: 20 TABLET ORAL at 09:01

## 2018-01-23 NOTE — PROGRESS NOTES
Called to confirm pick-up time.  Pt's ambulance crew is running approx. 30 minutes behind and should be here around 4:30

## 2018-01-23 NOTE — PROGRESS NOTES
Follow-up Information     [facility]. Go on 1/23/2018.    Specialties:  Nursing Home Agency, SNF Agency  Why:  SNF Facility  Contact information:  [address] [place] [address]  [phone]             Shashi Garcia III, MD. Go on 1/31/2018.    Specialty:  Orthopedic Surgery  Why:  Outpatient Services, Ortho Surgery Follow-up Appointment, Please arrive to clinic for 9:00AM  Contact information:  [address] [facility]  [address]  [place] [address]  [phone]                   [facility]

## 2018-01-23 NOTE — PLAN OF CARE
Problem: Physical Therapy Goal  Goal: Physical Therapy Goal  Goals to be met by: 18.     Patient will increase functional independence with mobility by performin. Supine to sit with Moderate Assistance  2. Sit to supine with Moderate Assistance  3. Bed to chair transfer with Moderate Assistance using Rolling Walker     Outcome: Ongoing (interventions implemented as appropriate)  Pt progressing, remains appropriate for SNF.

## 2018-01-23 NOTE — PLAN OF CARE
[facility]     Department of Hospital Medicine     [address] [place] [address]       NURSING HOME ORDERS    01/23/2018    Admit to Nursing Home:  Skilled Bed                                   Diagnoses:  Active Hospital Problems    Diagnosis  POA    *Closed fracture of right femur [S72.91XA]  Yes    Acute blood loss anemia [D62]  No    Major depressive disorder, recurrent episode [F33.9]  Yes    Anemia of chronic disease [D63.8]  Yes     Chronic    Essential hypertension [I10]  Yes     Chronic    Hyperlipidemia [E78.5]  Yes     Chronic    Dementia without behavioral disturbance [F03.90]  Yes     Chronic      Resolved Hospital Problems    Diagnosis Date Resolved POA    Closed fracture of distal end of right femur [S72.401A] 01/19/2018 Yes       Patient is homebound due to:  Closed fracture of right femur    Allergies:  Review of patient's allergies indicates:   Allergen Reactions    Codeine     Penicillins        Vitals:       Every shift (Skilled Nursing patients)    Diet: Low salt (<2g daily) diet    Acitivities:      - Up in a chair each morning as tolerated   - Ambulate with assistance to bathroom   - Scheduled walks once each shift (every 8 hours)   - May use walker, cane, or self-propelled wheelchair    LABS:  CMP, CBC each month for 3 months    Nursing Precautions:    - Aspiration precautions:             - Total assistance with meals            -  Upright 90 degrees befor during and after meals             -  Suction at bedside          - Fall precautions per nursing home protocol   - Decubitus precautions:        -  for positioning   - Pressure reducing foam mattress   - Turn patient every two hours. Use wedge pillows to anchor patient  - If no urine output in >6 hours, in and out cath. For urinary retention.     CONSULTS:    Physical Therapy to evaluate and treat- non weight bearing on R leg     Occupational Therapy to evaluate and treat- non weight bearing on R  leg     Psychiatry to evaluate and follow patients for delirium    MISCELLANEOUS CARE:       Routine Skin for Bedridden Patients:  Apply moisture barrier cream to all    skin folds and wet areas in perineal area daily and after baths and                           all bowel movements.    Medications: Discontinue all previous medication orders, if any. See new list below.     Oksana Estrada   Home Medication Instructions DENZEL:[id number]    Printed on:01/23/18 3459   Medication Information                      alendronate (FOSAMAX) 70 MG tablet  Take 1 tablet (70 mg total) by mouth every 7 days.             atorvastatin (LIPITOR) 20 MG tablet  Take 1 tablet (20 mg total) by mouth once daily.             chlordiazepoxide-clidinium 5-2.5 mg (LIBRAX) 5-2.5 mg Cap  Take 1 capsule by mouth 3 (three) times daily as needed. For irritable bowel syndrome             cholestyramine-aspartame (QUESTRAN LIGHT) 4 gram PwPk  Take 1 packet (4 g total) by mouth 2 (two) times daily. As needed for diarrhea             donepezil (ARICEPT) 5 MG tablet  Take 1 tablet (5 mg total) by mouth every evening. For memory             escitalopram oxalate (LEXAPRO) 20 MG tablet  Take 1 tablet (20 mg total) by mouth once daily.             lisinopril (PRINIVIL,ZESTRIL) 20 MG tablet  Take 1 tablet (20 mg total) by mouth once daily.             metoprolol succinate (TOPROL-XL) 50 MG 24 hr tablet  Take 50 mg by mouth once daily.             quetiapine (SEROQUEL) 25 MG Tab  Take 1 tablet (25 mg total) by mouth every evening.             tolterodine (DETROL) 1 MG Tab  Take 1 tablet (1 mg total) by mouth 2 (two) times daily.             traMADol (ULTRAM) 50 mg tablet  Take 50 mg by mouth every 6 (six) hours as needed for Pain.                   _________________________________  Kaci Rodriguez MD  01/23/2018

## 2018-01-23 NOTE — PLAN OF CARE
Problem: Occupational Therapy Goal  Goal: Occupational Therapy Goal  Goals to be met by: 02/17/18     Patient will increase functional independence with ADLs by performing:    UE Dressing with Set-up Assistance.  LE Dressing with Minimal Assistance with AE as needed.  Grooming while seated with Set-up Assistance.  Toileting from bedside commode with Moderate Assistance for hygiene and clothing management.   Supine to sit with Minimal Assistance.  Stand pivot transfers with Moderate Assistance.  Toilet transfer to bedside commode with Moderate Assistance.  Upper extremity exercise program x 10 reps with supervision.             Outcome: Ongoing (interventions implemented as appropriate)  Patient will benefit from continued skilled OT.

## 2018-01-23 NOTE — PROGRESS NOTES
Pt in bed a little more alert today          Temp:  [97.3 °F (36.3 °C)-98.8 °F (37.1 °C)] 98.1 °F (36.7 °C)  Pulse:  [54-64] 54  Resp:  [17-19] 19  SpO2:  [91 %-97 %] 91 %  BP: (117-136)/(58-71) 126/61        PE:    Dressings clean R thigh        Recent Labs  Lab 01/23/18  0646   WBC 7.21   RBC 3.08*   HGB 8.9*   HCT 27.2*      MCV 88   MCH 28.9   MCHC 32.7         Current Facility-Administered Medications:     0.9%  NaCl infusion (for blood administration), , Intravenous, Q24H PRN, Po Delarosa MD    0.9%  NaCl infusion, , Intravenous, Continuous, Beatrice Morales MD, Last Rate: 100 mL/hr at 01/22/18 1323    acetaminophen tablet 650 mg, 650 mg, Oral, Q8H PRN, Beatrice Morales MD    atorvastatin tablet 20 mg, 20 mg, Oral, Daily, Beatrice Morales MD, 20 mg at 01/23/18 0941    donepezil tablet 5 mg, 5 mg, Oral, QHS, Beatrice Morales MD, 5 mg at 01/22/18 2009    enoxaparin injection 40 mg, 40 mg, Subcutaneous, Daily, Shashi Garcia III, MD, 40 mg at 01/22/18 1653    escitalopram oxalate tablet 20 mg, 20 mg, Oral, Daily, Beatrice Morlaes MD, 20 mg at 01/23/18 0942    famotidine tablet 20 mg, 20 mg, Oral, Daily, Beatrice Morales MD, 20 mg at 01/23/18 0942    lisinopril tablet 20 mg, 20 mg, Oral, Daily, Beatrice Morales MD, 20 mg at 01/23/18 0942    metoprolol succinate (TOPROL-XL) 24 hr tablet 50 mg, 50 mg, Oral, Daily, Beatrice Morales MD, 50 mg at 01/23/18 0941    ondansetron injection 4 mg, 4 mg, Intravenous, Q4H PRN, Beatrice Morales MD    oxybutynin 24 hr tablet 5 mg, 5 mg, Oral, Daily, Beatrice Morales MD, 5 mg at 01/23/18 0942    polyethylene glycol packet 17 g, 17 g, Oral, Daily, Beatrice Morales MD, 17 g at 01/23/18 0942    potassium chloride SA CR tablet 40 mEq, 40 mEq, Oral, Q4H, Kaci Rodriguez MD, 40 mEq at 01/23/18 0942    QUEtiapine tablet 25 mg, 25 mg, Oral, QHS, Beatrice Morales MD, 25 mg at 01/22/18 2009    traMADol tablet 50 mg, 50 mg, Oral, Q6H  PRN, Beatrice Morales MD, 50 mg at 01/22/18 1327      A/P:    S/p HWR and revision ORIF of R femur periprosthetic fracture 1/19     Anemia - blood loss, improved s/p transfusion     PT/OT - strict NWB R LE     dvt proph - lovenox     Placement pending

## 2018-01-23 NOTE — PT/OT/SLP PROGRESS
Occupational Therapy   Treatment    Name: Oksana Estrada  MRN: [id number]  Admitting Diagnosis:  Closed fracture of right femur  4 Days Post-Op    Recommendations:     Discharge Recommendations: nursing facility, skilled  Discharge Equipment Recommendations:   (TBD)  Barriers to discharge:  Decreased caregiver support, Inaccessible home environment    Subjective     Communicated with: Nurse prior to session.  Patient agreeable to therapy.  Pain/Comfort:  · Pain Rating 1:  (yes; did not rate)  · Pain Addressed 1: Pre-medicate for activity, Reposition, Distraction, Cessation of Activity, Nurse notified    Patients cultural, spiritual, Caodaism conflicts given the current situation:      Objective:     Patient found with: bed alarm, peripheral IV, telemetry, pressure relief boots, lara catheter    General Precautions: Standard, fall   Orthopedic Precautions:RLE non weight bearing   Braces: N/A     Occupational Performance:    Bed Mobility:    · Patient completed Rolling/Turning to Right with total assistance  · Patient completed Scooting/Bridging with maximal assistance and total assistance  · Patient completed Supine to Sit with total assistance  · Patient completed Sit to Supine with total assistance     Functional Mobility/Transfers:  · Patient completed Bed <> Chair Transfer using Slide Board technique with total assistance with slide board; VC for technique    Activities of Daily Living:  · Grooming: set up assistance    · LB Dressing: total assistance      Patient left HOB elevated with all lines intact, call button in reach, bed alarm on and nurse notified    [facility] 6 Click:  [facility] Total Score: 12    Treatment & Education:  Patient performed bed mobility, transfers, and ADL's as above.  Patient performed BUE AAROM shoulder flex/ext, elbow flex/ext, and shoulder shrugs x 10 reps.  P  Education:    Assessment:     Oksana Estrada is a 87 y.o. female with a medical diagnosis of Closed fracture of right  femur.  She presents with the following performance deficits affecting functionweakness, impaired endurance, impaired self care skills, impaired functional mobilty, gait instability, impaired balance, decreased upper extremity function, decreased lower extremity function, decreased coordination, decreased safety awareness, pain, impaired skin, edema, decreased ROM, impaired coordination, orthopedic precautions.      Rehab Prognosis:  good; patient would benefit from acute skilled OT services to address these deficits and reach maximum level of function.       Plan:     Patient to be seen 5 x/week, 6 x/week to address the above listed problems via self-care/home management, therapeutic activities, therapeutic exercises  · Plan of Care Expires: 02/17/18  · Plan of Care Reviewed with: patient    This Plan of care has been discussed with the patient who was involved in its development and understands and is in agreement with the identified goals and treatment plan    GOALS:    Occupational Therapy Goals        Problem: Occupational Therapy Goal    Goal Priority Disciplines Outcome Interventions   Occupational Therapy Goal     OT, PT/OT Ongoing (interventions implemented as appropriate)    Description:  Goals to be met by: 02/17/18     Patient will increase functional independence with ADLs by performing:    UE Dressing with Set-up Assistance.  LE Dressing with Minimal Assistance with AE as needed.  Grooming while seated with Set-up Assistance.  Toileting from bedside commode with Moderate Assistance for hygiene and clothing management.   Supine to sit with Minimal Assistance.  Stand pivot transfers with Moderate Assistance.  Toilet transfer to bedside commode with Moderate Assistance.  Upper extremity exercise program x 10 reps with supervision.                              Time Tracking:     OT Date of Treatment: 01/23/18  OT Start Time: 1047  OT Stop Time: 1140  OT Total Time (min): 53 min    Billable  Minutes:Therapeutic Activity 15  Therapeutic Exercise 15 ( co-tx with PT)    PONCE Lewis  1/23/2018

## 2018-01-23 NOTE — PLAN OF CARE
Problem: Fall Risk (Adult)  Goal: Identify Related Risk Factors and Signs and Symptoms  Related risk factors and signs and symptoms are identified upon initiation of Human Response Clinical Practice Guideline (CPG)   Outcome: Ongoing (interventions implemented as appropriate)   01/22/18 1936   Fall Risk   Related Risk Factors (Fall Risk) age-related changes;culprit medication(s);bladder function altered;fatigue/slow reaction;gait/mobility problems;history of falls;polypharmacy

## 2018-01-23 NOTE — PROGRESS NOTES
SW forwarded narcotic prescription and follow-up appointment via [facility] to [facility].

## 2018-01-23 NOTE — PT/OT/SLP PROGRESS
Physical Therapy Treatment    Patient Name:  Oksana Estrada   MRN:  [id number]    Recommendations:     Discharge Recommendations:  nursing facility, skilled   Discharge Equipment Recommendations:  (per SNF)   Barriers to discharge: None    Assessment:     Oksana Estrada is a 87 y.o. female admitted with a medical diagnosis of Closed fracture of right femur.  She presents with the following impairments/functional limitations:  weakness, impaired endurance, impaired self care skills, gait instability, impaired functional mobilty, impaired balance, decreased coordination, decreased upper extremity function, decreased lower extremity function, decreased safety awareness, pain, decreased ROM, impaired coordination, impaired skin, edema, impaired fine motor, orthopedic precautions .    Rehab Prognosis:  Good; patient would benefit from acute skilled PT services to address these deficits and reach maximum level of function.      Recent Surgery: Procedure(s) (LRB):  OPEN REDUCTION INTERNAL FIXATION-FEMUR (Right) 4 Days Post-Op    Plan:     During this hospitalization, patient to be seen daily to address the above listed problems via therapeutic activities, therapeutic exercises, wheelchair management/training  · Plan of Care Expires:      Plan of Care Reviewed with: patient    Subjective     Communicated with nsg prior to session.  Patient found HOB upon PT entry to room, agreeable to treatment.      Chief Complaint: pain  Patient comments/goals: return to SNF  Pain/Comfort:  · Pain Rating 1:  (unable to rate)  · Location 1:  (R LE)  · Pain Addressed 1: Pre-medicate for activity, Reposition, Distraction, Cessation of Activity, Nurse notified    Patients cultural, spiritual, Quaker conflicts given the current situation: none    Objective:     Patient found with: bed alarm, peripheral IV, telemetry, pressure relief boots, lara catheter     General Precautions: Standard, fall   Orthopedic Precautions:RLE non  weight bearing   Braces: N/A     Functional Mobility:  · Bed Mobility:     · Rolling Right: total assistance with VC for logrolling  · Scooting: total assistance  · Bridging: maximal assistance  · Supine to Sit: total assistance  · Sit to Supine: total assistance  · Transfers:     · Bed to Chair: total assistance with  slide board  using  Slide Board with VC for hand placement, safety, and forward weight shift over ZARA  · Gait: activity did not occur  · Balance: FAir- sit      AM-PAC 6 CLICK MOBILITY  Turning over in bed (including adjusting bedclothes, sheets and blankets)?: 2  Sitting down on and standing up from a chair with arms (e.g., wheelchair, bedside commode, etc.): 2  Moving from lying on back to sitting on the side of the bed?: 2  Moving to and from a bed to a chair (including a wheelchair)?: 2  Need to walk in hospital room?: 1  Climbing 3-5 steps with a railing?: 1  Total Score: 10       Therapeutic Activities and Exercises:   Pt sat at EOB with Min A and performed Reaching activities in all direction, B FAQ's and HS's x 5 reps    Patient left HOB elevated with all lines intact, call button in reach, bed alarm on and nsg notified..    GOALS:    Physical Therapy Goals        Problem: Physical Therapy Goal    Goal Priority Disciplines Outcome Goal Variances Interventions   Physical Therapy Goal     PT/OT, PT Ongoing (interventions implemented as appropriate)     Description:  Goals to be met by: 18.     Patient will increase functional independence with mobility by performin. Supine to sit with Moderate Assistance  2. Sit to supine with Moderate Assistance  3. Bed to chair transfer with Moderate Assistance using Rolling Walker                      Time Tracking:     PT Received On: 18  PT Start Time: 1047     PT Stop Time: 1140  PT Total Time (min): 53 min     Billable Minutes: Therapeutic Activity 15 and Therapeutic Exercise 8    Treatment Type: Treatment  PT/PTA: PT     PTA Visit  Number: 0     Sandra العلي, PT  01/23/2018

## 2018-01-23 NOTE — PROGRESS NOTES
ALMA contacted patient's son Michele @ [phone] to report patient is being discharged today back to [facility] today. Michele stated to ALMA he was not informed patient was being discharged today and the surgeon told him patient will be non-weight bearing for the next 3 months. Michele reports patient will still need nursing care; patient can not go home alone. ALMA explained to Michele if patient is not receiving SNF she will be considered as a long term resident and medicare does not cover that cost. ALMA explained to Michele he will need to contact [facility] to find out if patient will qualify for long term care medicaid, which would cover majority of cost to stay in a nursing home. Michele stated he will contact Liz (admissions coordinator) with [facility]. Michele was also requesting patient move to another facility; ALMA explained most facilities does not have medicaid beds available at this time therefore options for other nursing homes will be limited.

## 2018-01-23 NOTE — PROGRESS NOTES
Attending MD contacted SW to report she updated NH orders by adding weight status and in/out cath care for patient, SW forwarded updated orders to [facility] via [facility].  provided nurse Kerns with call report information (room# [phone], ask for nurse Oh).

## 2018-01-23 NOTE — PROGRESS NOTES
Pt in bed reports wants to go home          Temp:  [97.3 °F (36.3 °C)-99.4 °F (37.4 °C)] 97.3 °F (36.3 °C)  Pulse:  [54-69] 64  Resp:  [16-19] 18  SpO2:  [90 %-98 %] 96 %  BP: ()/(53-71) 117/71        PE:    R thigh incisions intact look good, no drainage, no erythema        Recent Labs  Lab 01/22/18  0443   WBC 7.35   RBC 2.87*   HGB 8.6*   HCT 25.5*      MCV 89   MCH 30.0   MCHC 33.7         Current Facility-Administered Medications:     0.9%  NaCl infusion (for blood administration), , Intravenous, Q24H PRN, Po Delarosa MD    0.9%  NaCl infusion, , Intravenous, Continuous, Beatrice Morales MD, Last Rate: 100 mL/hr at 01/22/18 1323    acetaminophen tablet 650 mg, 650 mg, Oral, Q8H PRN, Beatrice Morales MD    atorvastatin tablet 20 mg, 20 mg, Oral, Daily, Beatrice Morales MD, 20 mg at 01/22/18 0859    cefazolin (ANCEF) 2 gram in dextrose 5% 50 mL IVPB (premix), 2 g, Intravenous, Once, Shashi Garcia III, MD    donepezil tablet 5 mg, 5 mg, Oral, QHS, Beatrice Morales MD, 5 mg at 01/21/18 2029    enoxaparin injection 40 mg, 40 mg, Subcutaneous, Daily, Shashi Garcia III, MD, 40 mg at 01/22/18 1653    escitalopram oxalate tablet 20 mg, 20 mg, Oral, Daily, Beatrice Morales MD, 20 mg at 01/22/18 0859    famotidine tablet 20 mg, 20 mg, Oral, Daily, Beatrice Morales MD, 20 mg at 01/22/18 0859    lisinopril tablet 20 mg, 20 mg, Oral, Daily, Beatrice Morales MD, 20 mg at 01/22/18 0859    metoprolol succinate (TOPROL-XL) 24 hr tablet 50 mg, 50 mg, Oral, Daily, Beatrice Morales MD, 50 mg at 01/22/18 0859    ondansetron injection 4 mg, 4 mg, Intravenous, Q4H PRN, Beatrice Morales MD    oxybutynin 24 hr tablet 5 mg, 5 mg, Oral, Daily, Beatrice Morales MD, 5 mg at 01/22/18 0859    polyethylene glycol packet 17 g, 17 g, Oral, Daily, Beatrice Morales MD, 17 g at 01/22/18 0859    QUEtiapine tablet 25 mg, 25 mg, Oral, QHS, Beatrice Morales MD, 25 mg at 01/21/18 2029     traMADol tablet 50 mg, 50 mg, Oral, Q6H PRN, Beatrice Morales MD, 50 mg at 01/22/18 1327      A/P:    S/p HWR and revision ORIF of R femur periprosthetic fracture 1/19     Anemia - blood loss, improved s/p transfusion     PT/OT - strict NWB R LE     dvt proph - lovenox     Placement     Dressing change

## 2018-01-23 NOTE — PLAN OF CARE
Problem: Pressure Ulcer Risk (Talha Scale) (Adult,Obstetrics,Pediatric)  Goal: Identify Related Risk Factors and Signs and Symptoms  Related risk factors and signs and symptoms are identified upon initiation of Human Response Clinical Practice Guideline (CPG)   Outcome: Ongoing (interventions implemented as appropriate)   01/22/18 1937   Pressure Ulcer Risk (Talha Scale)   Related Risk Factors (Pressure Ulcer Risk (Talha Scale)) age extremes;body weight extremes;hospitalization prolonged;medication;mobility impaired;nutritional deficiencies

## 2018-01-23 NOTE — PLAN OF CARE
01/23/18 1459   Final Note   Assessment Type Final Discharge Note   Discharge Disposition SNF  ([facility])   Hospital Follow Up  Appt(s) scheduled? Yes   Right Care Referral Info   Post Acute Recommendation SNF / Sub-Acute Rehab

## 2018-01-23 NOTE — PROGRESS NOTES
ALMA followed back up with patient's son in regards to patient discharging today, Michele in agreement with patient leaving today.

## 2018-01-23 NOTE — PT/OT/SLP DISCHARGE
Physical Therapy Discharge Summary    Name: Oksana Estrada  MRN: [id number]   Principal Problem: Closed fracture of right femur     Patient Discharged from acute Physical Therapy on 2018.  Please refer to prior PT noted date on 2018 for functional status.     Assessment:     Patient appropriate for care in another setting.    Objective:     GOALS:    Physical Therapy Goals        Problem: Physical Therapy Goal    Goal Priority Disciplines Outcome Goal Variances Interventions   Physical Therapy Goal     PT/OT, PT Ongoing (interventions implemented as appropriate)     Description:  Goals to be met by: 18.     Patient will increase functional independence with mobility by performin. Supine to sit with Moderate Assistance  2. Sit to supine with Moderate Assistance  3. Bed to chair transfer with Moderate Assistance using Rolling Walker                      Reasons for Discontinuation of Therapy Services  Transfer to alternate level of care.      Plan:     Patient Discharged to: Skilled Nursing Facility.    Sandra العلي, PT  2018

## 2018-01-23 NOTE — PROGRESS NOTES
ALMA contacted [facility] @ [phone] and spoke to Иван to arrange a stretcher to transport patient back to [facility], ALMA requested for portable oxygen and ETA will be 4:00pm. ALMA provided son Michele with pick-up time.

## 2018-01-23 NOTE — PROGRESS NOTES
ALMA contacted Dr. Garcia office @ [phone] to schedule ortho follow-up, ALMA spoke to Mariluz, appointment scheduled for 1/31/18 @ 9:00am.

## 2018-01-24 ENCOUNTER — PATIENT OUTREACH (OUTPATIENT)
Dept: ADMINISTRATIVE | Facility: CLINIC | Age: 83
End: 2018-01-24

## 2018-01-24 NOTE — DISCHARGE SUMMARY
[facility] Hospital Medicine  Discharge Summary      Patient Name: Oksana Estrada  MRN: [id number]  Admission Date: 1/18/2018  Hospital Length of Stay: 5 days  Discharge Date and Time:  01/23/2018 9:23 PM  Attending Physician: No att. providers found   Discharging Provider: Kaci Rodriguez MD  Primary Care Provider: Red Griffith MD      HPI:   Mrs. Estrada is an 88 yo F who presents with a CC of R hip pain.  The patient evidently had a R femur fracture/repair on 12/22/17 and was sent to a rehab facility. [facility] was then [facility]  She is not sure of the event- but apparently had some trauma to the R hip. She presents to the ED and is found to have an angulated fracture. Ortho has been consulted and plans on surgery this am.  Otherwise, she has no complaints and is quite grumpy.      Procedure(s) (LRB):  OPEN REDUCTION INTERNAL FIXATION-FEMUR (Right)      Hospital Course:   The patient was admitted to the hospital for evaluation and treatment of R hip angulated fracture.  She had a repair of this 12/22/17 and apparently suffered some trauma while at [facility].  Ortho was consulted and the patient was taken back to surgery on 1/19/18 for repair. PT/OT evaluated the patient and recommended SNF. The patient had a drop in her H/H and was transfused 2 units on 1/21. Ortho was notified. Discharged to SNF.        Physical Exam  Vitals:    01/23/18 0136 01/23/18 0502 01/23/18 0732 01/23/18 1627   BP: (!) 124/59 136/62 126/61 127/67   Patient Position: Lying Lying     Pulse: 60 (!) 57 (!) 54 (!) 59   Resp: 18 18 19 19   Temp: 98.8 °F (37.1 °C) 98.3 °F (36.8 °C) 98.1 °F (36.7 °C) 98 °F (36.7 °C)   TempSrc: Oral Oral  Oral   SpO2:  96% (!) 91% 95%   Weight:       Height:         Physical Exam   Constitutional: She is oriented to person, place, and time. She appears well-developed and well-nourished.   HENT:   Head: Atraumatic. Anicteric sclera, no pallor, MMM  Cv: RRR no m/g/r  PULM: CTAB on  room air  MSK: R leg bandages clean dry intact  Neurological: She is alert and oriented to person, place, and time.   Psychiatric: Appears withdrawn, poor eye contact, poverty of speech  Vitals reviewed.    Consults:   Consults         Status Ordering Provider     Inpatient consult to Orthopedic Surgery  Once     Provider:  Jordan Ferrera MD    Completed GAIL RODRIGUES     Inpatient consult to Psychiatry  Once     Provider:  Jose Castro MD    Completed CARON MILLER          * Closed fracture of right femur     S/p repair on 1/19.   PT/OT- strict non weight bearing R leg          Acute blood loss anemia    S/p 2 units of blood on 1/21 with appropriate response  Blood loss as expected post op          Major depressive disorder, recurrent episode    Continue home meds.         Urinary retention    Post op urinary retention  Lara in place during admission  Discharged without lara to SNF  Q6 in and out cath PRN          Anemia of chronic disease    Required transfusion for anemia  Anemia as expected post op        Hyperlipidemia    Continue home meds.           Essential hypertension    Continue home meds.           Dementia without behavioral disturbance    Patient appears depressed with poor eye contact and decreased speech  Psychiatry consulted  Continue home medications            Final Active Diagnoses:    Diagnosis Date Noted POA    PRINCIPAL PROBLEM:  Closed fracture of right femur [S72.91XA] 01/18/2018 Yes    Acute blood loss anemia [D62] 01/22/2018 No    Major depressive disorder, recurrent episode [F33.9] 01/19/2018 Yes    Urinary retention [R33.9] 12/23/2017 Yes    Anemia of chronic disease [D63.8] 12/20/2017 Yes     Chronic    Essential hypertension [I10] 12/20/2017 Yes     Chronic    Hyperlipidemia [E78.5] 12/20/2017 Yes     Chronic    Dementia without behavioral disturbance [F03.90] 09/25/2017 Yes     Chronic      Problems Resolved During this Admission:    Diagnosis  Date Noted Date Resolved POA    Closed fracture of distal end of right femur [S72.401A] 12/20/2017 01/19/2018 Yes       Discharged Condition: good    Disposition: Skilled Nursing Facility    Follow Up:  Follow-up Information     [facility]. Go on 1/23/2018.    Specialties:  Nursing Home Agency, SNF Agency  Why:  SNF Facility  Contact information:  [address] [place] [address]  [phone]             Shashi Garcia III, MD. Go on 1/31/2018.    Specialty:  Orthopedic Surgery  Why:  Outpatient Services, Ortho Surgery Follow-up Appointment, Please arrive to clinic for 9:00AM  Contact information:  [address]  [address]  [place] [address]  [phone]                 Patient Instructions:     Weight bearing restrictions (specify)   Order Comments: Non weight bearing on right leg     Notify your health care provider if you experience any of the following:  temperature >100.4     Notify your health care provider if you experience any of the following:  persistent nausea and vomiting or diarrhea     Notify your health care provider if you experience any of the following:  severe uncontrolled pain     Notify your health care provider if you experience any of the following:  redness, tenderness, or signs of infection (pain, swelling, redness, odor or green/yellow discharge around incision site)     Notify your health care provider if you experience any of the following:  difficulty breathing or increased cough     Notify your health care provider if you experience any of the following:  severe persistent headache     Notify your health care provider if you experience any of the following:  worsening rash     Notify your health care provider if you experience any of the following:  persistent dizziness, light-headedness, or visual disturbances     Notify your health care provider if you experience any of the following:  increased confusion or weakness         Significant Diagnostic  Studies: Labs:   BMP:   Recent Labs  Lab 01/22/18  0444 01/23/18  0646   GLU 86 119*   * 137   K 3.5 3.2*    109   CO2 24 24   BUN 16 15   CREATININE 0.8 0.8   CALCIUM 7.6* 7.7*    and CBC   Recent Labs  Lab 01/22/18  0443 01/23/18  0646   WBC 7.35 7.21   HGB 8.6* 8.9*   HCT 25.5* 27.2*    271       Pending Diagnostic Studies:     None         Medications:  Reconciled Home Medications:   Discharge Medication List as of 1/23/2018  5:01 PM      CONTINUE these medications which have CHANGED    Details   traMADol (ULTRAM) 50 mg tablet Take 1 tablet (50 mg total) by mouth every 24 hours as needed for Pain., Starting Tue 1/23/2018, Print         CONTINUE these medications which have NOT CHANGED    Details   alendronate (FOSAMAX) 70 MG tablet Take 1 tablet (70 mg total) by mouth every 7 days., Starting 2/20/2017, Until Discontinued, Normal      atorvastatin (LIPITOR) 20 MG tablet Take 1 tablet (20 mg total) by mouth once daily., Starting 3/30/2017, Until Discontinued, Normal      chlordiazepoxide-clidinium 5-2.5 mg (LIBRAX) 5-2.5 mg Cap Take 1 capsule by mouth 3 (three) times daily as needed. For irritable bowel syndrome, Starting 3/30/2017, Until Discontinued, Print      cholestyramine-aspartame (QUESTRAN LIGHT) 4 gram PwPk Take 1 packet (4 g total) by mouth 2 (two) times daily. As needed for diarrhea, Starting 3/30/2017, Until Fri 3/30/18, Normal      donepezil (ARICEPT) 5 MG tablet Take 1 tablet (5 mg total) by mouth every evening. For memory, Starting 3/30/2017, Until Fri 3/30/18, Normal      escitalopram oxalate (LEXAPRO) 20 MG tablet Take 1 tablet (20 mg total) by mouth once daily., Starting 3/5/2017, Until Discontinued, Normal      lisinopril (PRINIVIL,ZESTRIL) 20 MG tablet Take 1 tablet (20 mg total) by mouth once daily., Starting 3/30/2017, Until Discontinued, Normal      metoprolol succinate (TOPROL-XL) 50 MG 24 hr tablet Take 50 mg by mouth once daily., Historical Med      quetiapine  (SEROQUEL) 25 MG Tab Take 1 tablet (25 mg total) by mouth every evening., Starting 2/20/2017, Until Tue 2/20/18, Normal      tolterodine (DETROL) 1 MG Tab Take 1 tablet (1 mg total) by mouth 2 (two) times daily., Starting 3/30/2017, Until Fri 3/30/18, Normal         STOP taking these medications       chlorthalidone (HYGROTEN) 25 MG Tab Comments:   Reason for Stopping:         terbinafine HCl (LAMISIL) 250 mg tablet Comments:   Reason for Stopping:               Indwelling Lines/Drains at time of discharge:   Lines/Drains/Airways          No matching active lines, drains, or airways          Time spent on the discharge of patient: >30 minutes  Patient was seen and examined on the date of discharge and determined to be suitable for discharge.           Kaci Rodriguez MD  Department of Hospital Medicine  [facility]

## 2018-01-24 NOTE — ASSESSMENT & PLAN NOTE
Post op urinary retention  Lara in place during admission  Discharged without lara to SNF  Q6 in and out cath PRN

## 2018-01-24 NOTE — ASSESSMENT & PLAN NOTE
Patient appears depressed with poor eye contact and decreased speech  Psychiatry consulted  Continue home medications

## 2018-02-08 ENCOUNTER — OUTSIDE PLACE OF SERVICE (OUTPATIENT)
Dept: INTERNAL MEDICINE | Facility: CLINIC | Age: 83
End: 2018-02-08
Payer: MEDICARE

## 2018-02-08 PROCEDURE — 99309 SBSQ NF CARE MODERATE MDM 30: CPT | Mod: ,,, | Performed by: INTERNAL MEDICINE

## 2018-02-22 ENCOUNTER — OUTSIDE PLACE OF SERVICE (OUTPATIENT)
Dept: INTERNAL MEDICINE | Facility: CLINIC | Age: 83
End: 2018-02-22
Payer: MEDICARE

## 2018-02-22 PROCEDURE — 99309 SBSQ NF CARE MODERATE MDM 30: CPT | Mod: ,,, | Performed by: INTERNAL MEDICINE

## 2018-03-19 ENCOUNTER — HOSPITAL ENCOUNTER (INPATIENT)
Facility: HOSPITAL | Age: 83
LOS: 3 days | Discharge: HOSPICE | DRG: 682 | End: 2018-03-22
Attending: EMERGENCY MEDICINE | Admitting: HOSPITALIST
Payer: MEDICARE

## 2018-03-19 DIAGNOSIS — N39.0 URINARY TRACT INFECTION WITHOUT HEMATURIA, SITE UNSPECIFIED: Primary | ICD-10-CM

## 2018-03-19 DIAGNOSIS — N17.9 AKI (ACUTE KIDNEY INJURY): ICD-10-CM

## 2018-03-19 PROBLEM — E86.9 FLUID VOLUME DEPLETION: Status: ACTIVE | Noted: 2018-03-19

## 2018-03-19 PROBLEM — R62.7 FAILURE TO THRIVE IN ADULT: Status: ACTIVE | Noted: 2018-03-19

## 2018-03-19 PROBLEM — Z66 DNR (DO NOT RESUSCITATE): Status: ACTIVE | Noted: 2018-03-19

## 2018-03-19 PROBLEM — R62.51 FAILURE TO THRIVE (0-17): Status: ACTIVE | Noted: 2018-03-19

## 2018-03-19 PROBLEM — L89.302 DECUBITUS ULCER OF BUTTOCK, STAGE 2: Status: ACTIVE | Noted: 2018-03-19

## 2018-03-19 LAB
ALBUMIN SERPL BCP-MCNC: 2.7 G/DL
ALP SERPL-CCNC: 170 U/L
ALT SERPL W/O P-5'-P-CCNC: 48 U/L
ANION GAP SERPL CALC-SCNC: 18 MMOL/L
AST SERPL-CCNC: 64 U/L
BACTERIA #/AREA URNS HPF: ABNORMAL /HPF
BASOPHILS # BLD AUTO: ABNORMAL K/UL
BASOPHILS NFR BLD: 0 %
BILIRUB SERPL-MCNC: 0.3 MG/DL
BILIRUB UR QL STRIP: NEGATIVE
BUN SERPL-MCNC: 109 MG/DL
CALCIUM SERPL-MCNC: 10.2 MG/DL
CHLORIDE SERPL-SCNC: 106 MMOL/L
CLARITY UR: ABNORMAL
CO2 SERPL-SCNC: 14 MMOL/L
COLOR UR: YELLOW
CREAT SERPL-MCNC: 2.5 MG/DL
DIFFERENTIAL METHOD: ABNORMAL
EOSINOPHIL # BLD AUTO: ABNORMAL K/UL
EOSINOPHIL NFR BLD: 0 %
ERYTHROCYTE [DISTWIDTH] IN BLOOD BY AUTOMATED COUNT: 16.4 %
EST. GFR  (AFRICAN AMERICAN): 19 ML/MIN/1.73 M^2
EST. GFR  (NON AFRICAN AMERICAN): 17 ML/MIN/1.73 M^2
GLUCOSE SERPL-MCNC: 109 MG/DL
GLUCOSE UR QL STRIP: NEGATIVE
HCT VFR BLD AUTO: 32.1 %
HGB BLD-MCNC: 10.4 G/DL
HGB UR QL STRIP: ABNORMAL
HYALINE CASTS #/AREA URNS LPF: 0 /LPF
KETONES UR QL STRIP: NEGATIVE
LACTATE SERPL-SCNC: 1.4 MMOL/L
LEUKOCYTE ESTERASE UR QL STRIP: ABNORMAL
LYMPHOCYTES # BLD AUTO: ABNORMAL K/UL
LYMPHOCYTES NFR BLD: 4 %
MCH RBC QN AUTO: 29.2 PG
MCHC RBC AUTO-ENTMCNC: 32.4 G/DL
MCV RBC AUTO: 90 FL
MICROSCOPIC COMMENT: ABNORMAL
MONOCYTES # BLD AUTO: ABNORMAL K/UL
MONOCYTES NFR BLD: 4 %
NEUTROPHILS NFR BLD: 92 %
NITRITE UR QL STRIP: NEGATIVE
PH UR STRIP: 8 [PH] (ref 5–8)
PLATELET # BLD AUTO: 674 K/UL
PMV BLD AUTO: 10.3 FL
POTASSIUM SERPL-SCNC: 5.1 MMOL/L
PROT SERPL-MCNC: 8.1 G/DL
PROT UR QL STRIP: ABNORMAL
RBC # BLD AUTO: 3.56 M/UL
RBC #/AREA URNS HPF: 0 /HPF (ref 0–4)
SODIUM SERPL-SCNC: 138 MMOL/L
SP GR UR STRIP: 1.01 (ref 1–1.03)
SQUAMOUS #/AREA URNS HPF: 0 /HPF
URN SPEC COLLECT METH UR: ABNORMAL
UROBILINOGEN UR STRIP-ACNC: NEGATIVE EU/DL
WBC # BLD AUTO: 32.08 K/UL
WBC #/AREA URNS HPF: >100 /HPF (ref 0–5)
YEAST URNS QL MICRO: ABNORMAL

## 2018-03-19 PROCEDURE — 80053 COMPREHEN METABOLIC PANEL: CPT

## 2018-03-19 PROCEDURE — 85027 COMPLETE CBC AUTOMATED: CPT

## 2018-03-19 PROCEDURE — 83605 ASSAY OF LACTIC ACID: CPT

## 2018-03-19 PROCEDURE — 25000003 PHARM REV CODE 250: Performed by: NURSE PRACTITIONER

## 2018-03-19 PROCEDURE — 87088 URINE BACTERIA CULTURE: CPT

## 2018-03-19 PROCEDURE — 87186 SC STD MICRODIL/AGAR DIL: CPT

## 2018-03-19 PROCEDURE — 96365 THER/PROPH/DIAG IV INF INIT: CPT

## 2018-03-19 PROCEDURE — 87077 CULTURE AEROBIC IDENTIFY: CPT | Mod: 59

## 2018-03-19 PROCEDURE — 85007 BL SMEAR W/DIFF WBC COUNT: CPT

## 2018-03-19 PROCEDURE — 81000 URINALYSIS NONAUTO W/SCOPE: CPT

## 2018-03-19 PROCEDURE — 11000001 HC ACUTE MED/SURG PRIVATE ROOM

## 2018-03-19 PROCEDURE — 25000003 PHARM REV CODE 250: Performed by: EMERGENCY MEDICINE

## 2018-03-19 PROCEDURE — 87040 BLOOD CULTURE FOR BACTERIA: CPT

## 2018-03-19 PROCEDURE — 99285 EMERGENCY DEPT VISIT HI MDM: CPT | Mod: 25

## 2018-03-19 PROCEDURE — 87086 URINE CULTURE/COLONY COUNT: CPT

## 2018-03-19 PROCEDURE — 96361 HYDRATE IV INFUSION ADD-ON: CPT

## 2018-03-19 PROCEDURE — 63600175 PHARM REV CODE 636 W HCPCS: Performed by: EMERGENCY MEDICINE

## 2018-03-19 RX ORDER — CIPROFLOXACIN 2 MG/ML
400 INJECTION, SOLUTION INTRAVENOUS
Status: DISCONTINUED | OUTPATIENT
Start: 2018-03-20 | End: 2018-03-22

## 2018-03-19 RX ORDER — OXYBUTYNIN CHLORIDE 5 MG/1
5 TABLET, EXTENDED RELEASE ORAL DAILY
Status: DISCONTINUED | OUTPATIENT
Start: 2018-03-20 | End: 2018-03-20

## 2018-03-19 RX ORDER — TRAMADOL HYDROCHLORIDE 50 MG/1
50 TABLET ORAL
Status: DISCONTINUED | OUTPATIENT
Start: 2018-03-19 | End: 2018-03-21

## 2018-03-19 RX ORDER — CHOLESTYRAMINE 4 G/4.8G
1 POWDER, FOR SUSPENSION ORAL 2 TIMES DAILY
Status: DISCONTINUED | OUTPATIENT
Start: 2018-03-19 | End: 2018-03-20

## 2018-03-19 RX ORDER — IBUPROFEN 100 MG/5ML
1000 SUSPENSION, ORAL (FINAL DOSE FORM) ORAL DAILY
Status: ON HOLD | COMMUNITY
End: 2018-03-21 | Stop reason: HOSPADM

## 2018-03-19 RX ORDER — SODIUM CHLORIDE 0.9 % (FLUSH) 0.9 %
5 SYRINGE (ML) INJECTION
Status: DISCONTINUED | OUTPATIENT
Start: 2018-03-19 | End: 2018-03-22 | Stop reason: HOSPADM

## 2018-03-19 RX ORDER — DONEPEZIL HYDROCHLORIDE 5 MG/1
5 TABLET, FILM COATED ORAL NIGHTLY
Status: DISCONTINUED | OUTPATIENT
Start: 2018-03-19 | End: 2018-03-21

## 2018-03-19 RX ORDER — CHLORDIAZEPOXIDE HYDROCHLORIDE AND CLIDINIUM BROMIDE 5; 2.5 MG/1; MG/1
1 CAPSULE ORAL 3 TIMES DAILY PRN
Status: DISCONTINUED | OUTPATIENT
Start: 2018-03-19 | End: 2018-03-21

## 2018-03-19 RX ORDER — POTASSIUM CHLORIDE 750 MG/1
10 CAPSULE, EXTENDED RELEASE ORAL DAILY
Status: ON HOLD | COMMUNITY
End: 2018-03-21 | Stop reason: HOSPADM

## 2018-03-19 RX ORDER — ASCORBIC ACID 500 MG
1000 TABLET ORAL DAILY
Status: DISCONTINUED | OUTPATIENT
Start: 2018-03-20 | End: 2018-03-20

## 2018-03-19 RX ORDER — ESCITALOPRAM OXALATE 20 MG/1
20 TABLET ORAL DAILY
Status: DISCONTINUED | OUTPATIENT
Start: 2018-03-20 | End: 2018-03-21

## 2018-03-19 RX ORDER — ONDANSETRON 8 MG/1
8 TABLET, ORALLY DISINTEGRATING ORAL EVERY 8 HOURS PRN
Status: DISCONTINUED | OUTPATIENT
Start: 2018-03-19 | End: 2018-03-21

## 2018-03-19 RX ORDER — ATORVASTATIN CALCIUM 20 MG/1
20 TABLET, FILM COATED ORAL DAILY
Status: DISCONTINUED | OUTPATIENT
Start: 2018-03-20 | End: 2018-03-20

## 2018-03-19 RX ORDER — ACETAMINOPHEN 325 MG/1
650 TABLET ORAL EVERY 4 HOURS PRN
Status: DISCONTINUED | OUTPATIENT
Start: 2018-03-19 | End: 2018-03-21

## 2018-03-19 RX ORDER — CIPROFLOXACIN 2 MG/ML
400 INJECTION, SOLUTION INTRAVENOUS
Status: COMPLETED | OUTPATIENT
Start: 2018-03-19 | End: 2018-03-19

## 2018-03-19 RX ORDER — SODIUM CHLORIDE 9 MG/ML
INJECTION, SOLUTION INTRAVENOUS CONTINUOUS
Status: DISCONTINUED | OUTPATIENT
Start: 2018-03-19 | End: 2018-03-22

## 2018-03-19 RX ADMIN — SODIUM CHLORIDE: 0.9 INJECTION, SOLUTION INTRAVENOUS at 11:03

## 2018-03-19 RX ADMIN — SODIUM CHLORIDE 1000 ML: 0.9 INJECTION, SOLUTION INTRAVENOUS at 10:03

## 2018-03-19 RX ADMIN — CIPROFLOXACIN 400 MG: 2 INJECTION, SOLUTION INTRAVENOUS at 09:03

## 2018-03-19 RX ADMIN — SODIUM CHLORIDE 500 ML: 0.9 INJECTION, SOLUTION INTRAVENOUS at 06:03

## 2018-03-19 NOTE — ED PROVIDER NOTES
Encounter Date: 3/19/2018    SCRIBE #1 NOTE: I, Angelina Shah, am scribing for, and in the presence of,  Dr. Velazquez. I have scribed the entire note.       History     Chief Complaint   Patient presents with    Failure To Thrive     brought in from [facility] for failure to thrive. pt has been refusing po intake over the last few days. pt was placed in [facility] after a hip fx a few months ago.      Time seen by provider: 6:33 PM     This is a 87 y.o. female with PMHx of dementia and CVA who was brought in to the ED from [facility] for failure to thrive. Pt was placed in [facility] after a hip fracture 3 months ago. A few days ago she learned that she would not be returning home, and she began refusing PO intake. She denies any other medical complaints or injuries at this time.        The history is provided by the patient and medical records. The history is limited by the condition of the patient.     Review of patient's allergies indicates:   Allergen Reactions    Codeine     Penicillins      Past Medical History:   Diagnosis Date    Hx of psychiatric care     Hypertension     Stroke      Past Surgical History:   Procedure Laterality Date    BREAST SURGERY      lumpectomy left breast    JOINT REPLACEMENT      bilateral knee replacement     History reviewed. No pertinent family history.  Social History   Substance Use Topics    Smoking status: Former Smoker     Types: Cigarettes    Smokeless tobacco: Never Used      Comment: quit 25 years ago    Alcohol use No     Review of Systems   Unable to perform ROS: Mental status change   Constitutional: Positive for activity change (refusing PO intact), appetite change and unexpected weight change (Failure to thrive). Negative for fever.   Respiratory: Negative for chest tightness and shortness of breath.    Cardiovascular: Negative for chest pain.   Gastrointestinal: Negative for vomiting.   Skin: Negative for rash.   All other systems reviewed  and are negative.      Physical Exam     Initial Vitals [03/19/18 1620]   BP Pulse Resp Temp SpO2   (!) 95/50 62 16 96.5 °F (35.8 °C) 95 %      MAP       65         Physical Exam    Nursing note and vitals reviewed.  Constitutional: She appears well-developed. No distress.   Frail 86 y/o F in no distress, appears dehydrated   HENT:   Head: Normocephalic and atraumatic.   Mouth/Throat: Mucous membranes are dry.   Poor turgor. Tongue dry and cracked.   Eyes: Conjunctivae are normal. Pupils are equal, round, and reactive to light.   Neck: Normal range of motion.   Cardiovascular: Normal rate, regular rhythm and normal heart sounds.   No murmur heard.  Pulmonary/Chest: Breath sounds normal. No respiratory distress.   Abdominal: Soft. Bowel sounds are normal. She exhibits no distension. There is no tenderness.   Neurological: She is alert.   Oriented to person, place only   Skin: Skin is warm and dry.   Poor turgor   Psychiatric:    Pt is appropriate         ED Course   Procedures  Labs Reviewed   CBC W/ AUTO DIFFERENTIAL - Abnormal; Notable for the following:        Result Value    WBC 32.08 (*)     RBC 3.56 (*)     Hemoglobin 10.4 (*)     Hematocrit 32.1 (*)     RDW 16.4 (*)     Platelets 674 (*)     Gran% 92.0 (*)     Lymph% 4.0 (*)     All other components within normal limits   COMPREHENSIVE METABOLIC PANEL - Abnormal; Notable for the following:     CO2 14 (*)     BUN, Bld 109 (*)     Creatinine 2.5 (*)     Albumin 2.7 (*)     Alkaline Phosphatase 170 (*)     AST 64 (*)     ALT 48 (*)     Anion Gap 18 (*)     eGFR if  19 (*)     eGFR if non  17 (*)     All other components within normal limits   URINALYSIS - Abnormal; Notable for the following:     Appearance, UA Cloudy (*)     Protein, UA 2+ (*)     Occult Blood UA 2+ (*)     Leukocytes, UA 3+ (*)     All other components within normal limits   URINALYSIS MICROSCOPIC - Abnormal; Notable for the following:     WBC, UA >100 (*)      Bacteria, UA Many (*)     All other components within normal limits   CULTURE, URINE   CULTURE, URINE   CULTURE, BLOOD   CULTURE, BLOOD   LACTIC ACID, PLASMA             Medical Decision Making:   Initial Assessment:   87 y.o. female presents for failure to thrive and refusal of PO intake.  Differential Diagnosis:   Dehydration, metabolic derangement, UTI, acute kidney failure  Clinical Tests:   Lab Tests: Ordered and Reviewed  ED Management:     pt work up is + for dehydration, SYBIL and UTI.  She has been treated with IVF and Abx.    DNR on chart from nursing home signed on 01/4/18    I have consulted [facility] hospitalist for admission.  BC x 2, urine culture, IV abx and additional fluids given in ED.      07:55PM: Spoke to son, Michele, and updated him on pt's condition and plan. He states he will come by the hospital tomorrow.  Other:   I have discussed this case with another health care provider.                      Clinical Impression:     1. Urinary tract infection without hematuria, site unspecified    2. SYBIL (acute kidney injury)          Disposition:   Disposition: Admitted  Condition: Stable         I, Dr. Jayne Ramos, personally performed the services described in this documentation. All medical record entries made by the scribe were at my direction and in my presence.  I have reviewed the chart and agree that the record reflects my personal performance and is accurate and complete. Jayne Ramos MD.  7:55 PM 03/19/2018                   Jayne Ramos MD  03/21/18 0625

## 2018-03-20 PROBLEM — D62 ACUTE BLOOD LOSS ANEMIA: Status: RESOLVED | Noted: 2018-01-22 | Resolved: 2018-03-20

## 2018-03-20 PROBLEM — R33.9 URINARY RETENTION: Status: RESOLVED | Noted: 2017-12-23 | Resolved: 2018-03-20

## 2018-03-20 PROBLEM — S72.91XA CLOSED FRACTURE OF RIGHT FEMUR: Status: RESOLVED | Noted: 2018-01-18 | Resolved: 2018-03-20

## 2018-03-20 PROBLEM — D72.829 LEUKOCYTOSIS: Status: ACTIVE | Noted: 2018-03-20

## 2018-03-20 LAB
ANION GAP SERPL CALC-SCNC: 13 MMOL/L
BASOPHILS # BLD AUTO: 0.02 K/UL
BASOPHILS NFR BLD: 0.1 %
BUN SERPL-MCNC: 103 MG/DL
CALCIUM SERPL-MCNC: 9.2 MG/DL
CHLORIDE SERPL-SCNC: 113 MMOL/L
CO2 SERPL-SCNC: 15 MMOL/L
CREAT SERPL-MCNC: 1.9 MG/DL
DIFFERENTIAL METHOD: ABNORMAL
EOSINOPHIL # BLD AUTO: 0 K/UL
EOSINOPHIL NFR BLD: 0 %
ERYTHROCYTE [DISTWIDTH] IN BLOOD BY AUTOMATED COUNT: 16.4 %
EST. GFR  (AFRICAN AMERICAN): 27 ML/MIN/1.73 M^2
EST. GFR  (NON AFRICAN AMERICAN): 23 ML/MIN/1.73 M^2
GLUCOSE SERPL-MCNC: 115 MG/DL
HCT VFR BLD AUTO: 32.1 %
HGB BLD-MCNC: 10.2 G/DL
LYMPHOCYTES # BLD AUTO: 0.5 K/UL
LYMPHOCYTES NFR BLD: 2.2 %
MAGNESIUM SERPL-MCNC: 2.2 MG/DL
MCH RBC QN AUTO: 29 PG
MCHC RBC AUTO-ENTMCNC: 31.8 G/DL
MCV RBC AUTO: 91 FL
MONOCYTES # BLD AUTO: 1 K/UL
MONOCYTES NFR BLD: 4.4 %
NEUTROPHILS # BLD AUTO: 21.1 K/UL
NEUTROPHILS NFR BLD: 93.3 %
PHOSPHATE SERPL-MCNC: 3.5 MG/DL
PLATELET # BLD AUTO: 504 K/UL
PMV BLD AUTO: 9.8 FL
POTASSIUM SERPL-SCNC: 4.2 MMOL/L
RBC # BLD AUTO: 3.52 M/UL
SODIUM SERPL-SCNC: 141 MMOL/L
WBC # BLD AUTO: 22.83 K/UL

## 2018-03-20 PROCEDURE — 36415 COLL VENOUS BLD VENIPUNCTURE: CPT

## 2018-03-20 PROCEDURE — G8996 SWALLOW CURRENT STATUS: HCPCS | Mod: CN

## 2018-03-20 PROCEDURE — 63600175 PHARM REV CODE 636 W HCPCS: Performed by: NURSE PRACTITIONER

## 2018-03-20 PROCEDURE — 97161 PT EVAL LOW COMPLEX 20 MIN: CPT

## 2018-03-20 PROCEDURE — G8997 SWALLOW GOAL STATUS: HCPCS | Mod: CM

## 2018-03-20 PROCEDURE — 94761 N-INVAS EAR/PLS OXIMETRY MLT: CPT

## 2018-03-20 PROCEDURE — 63600175 PHARM REV CODE 636 W HCPCS: Performed by: PHYSICIAN ASSISTANT

## 2018-03-20 PROCEDURE — 25000003 PHARM REV CODE 250: Performed by: NURSE PRACTITIONER

## 2018-03-20 PROCEDURE — 84100 ASSAY OF PHOSPHORUS: CPT

## 2018-03-20 PROCEDURE — 85025 COMPLETE CBC W/AUTO DIFF WBC: CPT

## 2018-03-20 PROCEDURE — G8982 BODY POS GOAL STATUS: HCPCS | Mod: CM

## 2018-03-20 PROCEDURE — G8981 BODY POS CURRENT STATUS: HCPCS | Mod: CN

## 2018-03-20 PROCEDURE — 83735 ASSAY OF MAGNESIUM: CPT

## 2018-03-20 PROCEDURE — 80048 BASIC METABOLIC PNL TOTAL CA: CPT

## 2018-03-20 PROCEDURE — 97165 OT EVAL LOW COMPLEX 30 MIN: CPT

## 2018-03-20 PROCEDURE — 11000001 HC ACUTE MED/SURG PRIVATE ROOM

## 2018-03-20 PROCEDURE — 92610 EVALUATE SWALLOWING FUNCTION: CPT

## 2018-03-20 RX ORDER — ENOXAPARIN SODIUM 100 MG/ML
30 INJECTION SUBCUTANEOUS EVERY 24 HOURS
Status: DISCONTINUED | OUTPATIENT
Start: 2018-03-20 | End: 2018-03-22 | Stop reason: HOSPADM

## 2018-03-20 RX ADMIN — CIPROFLOXACIN 400 MG: 2 INJECTION, SOLUTION INTRAVENOUS at 08:03

## 2018-03-20 RX ADMIN — ENOXAPARIN SODIUM 30 MG: 100 INJECTION SUBCUTANEOUS at 07:03

## 2018-03-20 RX ADMIN — SODIUM CHLORIDE: 0.9 INJECTION, SOLUTION INTRAVENOUS at 08:03

## 2018-03-20 RX ADMIN — SODIUM CHLORIDE: 0.9 INJECTION, SOLUTION INTRAVENOUS at 11:03

## 2018-03-20 NOTE — ED NOTES
Received from local nursing home c/o patient refuses to eat or dring anything in past 4 days after being told she will not be returning home but will remain in nursing facility .. Pale ,weak appearance , very sluggish to respond ,mucous membranes dry and cracked ,

## 2018-03-20 NOTE — ASSESSMENT & PLAN NOTE
Patient appears depressed, but states that she is not sad. Continue home escitalopram if able to take PO.  Seen by Psychiatry 1/22/18 during last hospitalization who recommended labs and medication changes.  I ordered labs for the morning. Will discuss medication changes with son as patient not taking anything oral at this time.

## 2018-03-20 NOTE — PT/OT/SLP EVAL
"Physical Therapy Evaluation    Patient Name:  Oksana Estrada   MRN:  [id number]    Recommendations:     Discharge Recommendations:  nursing facility, basic   Discharge Equipment Recommendations: none   Barriers to discharge: None    Assessment:     Oksana Estrada is a 87 y.o. female admitted with a medical diagnosis of SYBIL (acute kidney injury).  She presents with the following impairments/functional limitations:  weakness, impaired endurance, impaired self care skills, impaired functional mobilty, impaired balance, impaired cognition, decreased upper extremity function, decreased lower extremity function, decreased safety awareness, pain, abnormal tone, decreased ROM, impaired coordination. Bed level evaluation completed today, pt does not provide assist to complete bed mobility and yells out "ouch" with mobility. Recommending return to NH nursing home.    Rehab Prognosis: Fair; patient would benefit from acute skilled PT services to address these deficits and reach maximum level of function.      Recent Surgery: * No surgery found *      Plan:     During this hospitalization, patient to be seen 3 x/week to address the above listed problems via therapeutic activities, therapeutic exercises, neuromuscular re-education  · Plan of Care Expires:  04/20/18   Plan of Care Reviewed with: patient    Subjective     Communicated with JARROD Emmanuel prior to session.  Patient found R sidelying with HOB elevated upon PT entry to room, agreeable to evaluation.      Chief Complaint: pt only states her first name and birth month and day but does not answer any other questions  Pain/Comfort:  · Pain Rating 1:  (did not rate but yells out "ouch" with mobility and when touching L knee)    Patients cultural, spiritual, Jew conflicts given the current situation: none reported    Living Environment:  Pt is a resident at [facility]. Spoke with NH staff who reports pt requires total assist x 2 for transfers to wheelchair and " "is still NWB through her RLE. Pt requires total assist for ADLs and was feeding herself until recently when she began not eating. Pt was discharged from skilled PT/OT as she was not making progress.   Patient has the following equipment: wheelchair, hospital bed.  DME owned (not currently used): none.  Upon discharge, patient will have assistance from NH staff.    Objective:     Patient found with: bed alarm, telemetry     General Precautions: Standard, fall, NPO   Orthopedic Precautions:RLE non weight bearing (per NH staff)   Braces: N/A     Exams:  · Cognitive Exam:  Patient is oriented to first name and birth month and birthday- pt does not answer any other questions  · Pt with very minimal command following, only squeezing L hand 1x when asked but does not follow other commands  · Gross Motor Coordination:  LLE in flexion and IR and yells "ouch" on attempt of extending LE in neutral- once hand positioned away from her L knee pt with improved tolerace to positioning but still with increased tone  · Postural Exam:  Patient presented with the following abnormalities:    · -       unable to assess 2/2 did not tolerate sitting EOB  · Sensation: pt does not answer questions to assess sensation  · Skin Integrity/Edema:      · -       Skin integrity: Visible skin intact  · -       Edema: None noted    · RLE ROM: Deficits: ankle DF lacking ~5 deg from neutral, increased tone with hip/knee flexion with limited ROM  · RLE Strength: does not follow cues to assess MMT  · LLE ROM: Deficits: ankle DF lacking ~5 deg and LLE help in flexion and IR  · LLE Strength: does not follow cues to assess strength    Functional Mobility:  · Bed Mobility:     · Scooting: dependence and of 2 persons  · Supine to Sit: total assistance and of 2 persons  · Sit to Supine: total assistance and of 2 persons    AM-PAC 6 CLICK MOBILITY  Total Score:6       Therapeutic Activities and Exercises:  Pt in R sidelying upon PT/OT entry to room. Pt " "instructed in rolling R/L and OT completed hygiene following small bowel accident. Pt yells "ouch" with mobility and does not provide assist. Positioned pt in L sidelying with pillow under R side.    Patient left left sidelying with all lines intact, call button in reach, bed alarm on and RN notified.    GOALS:    Physical Therapy Goals        Problem: Physical Therapy Goal    Goal Priority Disciplines Outcome Goal Variances Interventions   Physical Therapy Goal     PT/OT, PT Ongoing (interventions implemented as appropriate)     Description:  Goals to be met by: 18     Patient will increase functional independence with mobility by performin. Supine <> sit with Moderate Assistance  2. Sit to stand transfer with Moderate Assistance x 2  3. Bed to chair transfer with Maximum Assistance x 2 and maintaining weight-bearing precaution(s)   4. Sitting at edge of bed x 15 minutes with Minimal Assistance                      History:     Past Medical History:   Diagnosis Date    Hx of psychiatric care     Hypertension     Stroke        Past Surgical History:   Procedure Laterality Date    BREAST SURGERY      lumpectomy left breast    JOINT REPLACEMENT      bilateral knee replacement       Clinical Decision Making:     History  Co-morbidities and personal factors that may impact the plan of care Examination  Body Structures and Functions, activity limitations and participation restrictions that may impact the plan of care Clinical Presentation   Decision Making/ Complexity Score   Co-morbidities:   [x] Time since onset of injury / illness / exacerbation  [x] Status of current condition  [x]Patient's cognitive status and safety concerns    [x] Multiple Medical Problems (see med hx)  Personal Factors:   [x] Patient's age  [x] Prior Level of function   [] Patient's home situation (environment and family support)  [x] Patient's level of motivation  [x] Expected progression of patient      HISTORY:(criteria)    [] " 09021 - no personal factors/history    [] 76561 - has 1-2 personal factor/comorbidity     [x] 24926 - has >3 personal factor/comorbidity     Body Regions:  [] Objective examination findings  [] Head     []  Neck  [] Trunk   [x] Upper Extremity  [x] Lower Extremity    Body Systems:  [x] For communication ability, affect, cognition, language, and learning style: the assessment of the ability to make needs known, consciousness, orientation (person, place, and time), expected emotional /behavioral responses, and learning preferences (eg, learning barriers, education  needs)  [x] For the neuromuscular system: a general assessment of gross coordinated movement (eg, balance, gait, locomotion, transfers, and transitions) and motor function  (motor control and motor learning)  [x] For the musculoskeletal system: the assessment of gross symmetry, gross range of motion, gross strength, height, and weight  [] For the integumentary system: the assessment of pliability(texture), presence of scar formation, skin color, and skin integrity  [] For cardiovascular/pulmonary system: the assessment of heart rate, respiratory rate, blood pressure, and edema     Activity limitations:    [x] Patient's cognitive status and saf ety concerns          [] Status of current condition      [x] Weight bearing restriction  [] Cardiopulmunary Restriction    Participation Restrictions:   [] Goals and goal agreement with the patient     [x] Rehab potential (prognosis) and probable outcome      Examination of Body System: (criteria)    [] 36303 - addressing 1-2 elements    [] 81360 - addressing a total of 3 or more elements     [x] 88415 -  Addressing a total of 4 or more elements         Clinical Presentation: (criteria)  Stable - 76054     On examination of body system using standardized tests and measures patient presents with 4 or more elements from any of the following: body structures and functions, activity limitations, and/or participation  restrictions.  Leading to a clinical presentation that is considered stable and/or uncomplicated                              Clinical Decision Making  (Eval Complexity):  Low- 25559     Time Tracking:     PT Received On: 03/20/18  PT Start Time: 1000     PT Stop Time: 1016  PT Total Time (min): 16 min with OT    Billable Minutes: Evaluation 16      Emani Bradshaw, PT  03/20/2018

## 2018-03-20 NOTE — HOSPITAL COURSE
WBC 32K >> 23K >> 15K.   Creatinine 2.5 >> 1.9 >> 1.1. Spoke with son, Michele, in patient's room who confirms interest in Hospice.  Pt continues to refuse food and drink except for small sips of water and bites of pureed food as she requests.  Seen by Psychiatry during January 2018 admission who recommended labs and medication changes. No labs to explain change in mental status. Son states she was seen by Psych at the NH just 2-3 weeks ago who changed her medication.  Discharging with hospice at nursing home to focus on palliative care.

## 2018-03-20 NOTE — PLAN OF CARE
Patient unaccompanied, [place] contacted patient's son to complete DC assessment.  He would like for patient to return to [facility] for hospice care once ready for DC.  TN sent hospice referral to [facility] through [employer] as they are not answering their phone in admissions, DC likely not today as per Rekha Maya PAC.       03/20/18 1103   Discharge Assessment   Assessment Type Discharge Planning Assessment   Confirmed/corrected address and phone number on facesheet? Yes   Assessment information obtained from? Patient   Communicated expected length of stay with patient/caregiver yes   Prior to hospitilization cognitive status: Unable to Assess   Prior to hospitalization functional status: Assistive Equipment;Needs Assistance   Current Functional Status: Completely Dependent   Facility Arrived From: [facility]   Lives With facility resident   Able to Return to Prior Arrangements yes   Is patient able to care for self after discharge? No   Who are your caregiver(s) and their phone number(s)? Michele Estrada Son [phone]    Patient's perception of discharge disposition home or selfcare   Patient currently being followed by outpatient case management? No   Patient currently receives any other outside agency services? No   Equipment Currently Used at Home hospital bed  (Per NH)   Do you have any problems affording any of your prescribed medications? No   Is the patient taking medications as prescribed? yes   Does the patient have transportation home? Yes   Transportation Available agency transportation   Dialysis Name and Scheduled days n/a   Discharge Plan A Hospice/home;Return to nursing home   Patient/Family In Agreement With Plan yes

## 2018-03-20 NOTE — ED NOTES
Pt more responsive . C/o right hip pain when moved , fingers remain blue with poor cap refills . Infusing bolus

## 2018-03-20 NOTE — SUBJECTIVE & OBJECTIVE
Interval History: Pt minimally responsive.  No family present.     Review of Systems   Unable to perform ROS: Acuity of condition     Objective:     Vital Signs (Most Recent):  Temp: 97.8 °F (36.6 °C) (03/20/18 1219)  Pulse: 91 (03/20/18 1219)  Resp: 20 (03/20/18 1219)  BP: (!) 128/58 (03/20/18 1219)  SpO2: 97 % (03/20/18 0755) Vital Signs (24h Range):  Temp:  [96.5 °F (35.8 °C)-98.3 °F (36.8 °C)] 97.8 °F (36.6 °C)  Pulse:  [60-91] 91  Resp:  [14-20] 20  SpO2:  [95 %-99 %] 97 %  BP: ()/(50-66) 128/58     Weight: 63 kg (139 lb)  Body mass index is 21.13 kg/m².    Intake/Output Summary (Last 24 hours) at 03/20/18 1429  Last data filed at 03/20/18 1400   Gross per 24 hour   Intake                0 ml   Output                0 ml   Net                0 ml      Physical Exam   Constitutional: No distress.   HENT:   Dry, cracked lips and dry mucous membranes   Cardiovascular: Normal rate and regular rhythm.    Pulmonary/Chest: Effort normal and breath sounds normal. No respiratory distress. She has no wheezes.   Abdominal: Soft. There is no tenderness.   Musculoskeletal: She exhibits no edema.   Neurological: She is alert.   Wakes up, mumbles. Does not move right leg.     Psychiatric:   Flat/depressed affect.    Nursing note and vitals reviewed.      Significant Labs:   BMP:   Recent Labs  Lab 03/20/18  0419   *      K 4.2   *   CO2 15*   *   CREATININE 1.9*   CALCIUM 9.2   MG 2.2     CBC:   Recent Labs  Lab 03/19/18  1821 03/20/18  0419   WBC 32.08* 22.83*   HGB 10.4* 10.2*   HCT 32.1* 32.1*   * 504*     Magnesium:   Recent Labs  Lab 03/20/18  0419   MG 2.2       Significant Imaging: no new

## 2018-03-20 NOTE — SUBJECTIVE & OBJECTIVE
Past Medical History:   Diagnosis Date    Hx of psychiatric care     Hypertension     Stroke        Past Surgical History:   Procedure Laterality Date    BREAST SURGERY      lumpectomy left breast    JOINT REPLACEMENT      bilateral knee replacement       Review of patient's allergies indicates:   Allergen Reactions    Codeine     Penicillins        No current facility-administered medications on file prior to encounter.      Current Outpatient Prescriptions on File Prior to Encounter   Medication Sig    alendronate (FOSAMAX) 70 MG tablet Take 1 tablet (70 mg total) by mouth every 7 days.    atorvastatin (LIPITOR) 20 MG tablet Take 1 tablet (20 mg total) by mouth once daily.    chlordiazepoxide-clidinium 5-2.5 mg (LIBRAX) 5-2.5 mg Cap Take 1 capsule by mouth 3 (three) times daily as needed. For irritable bowel syndrome    cholestyramine-aspartame (QUESTRAN LIGHT) 4 gram PwPk Take 1 packet (4 g total) by mouth 2 (two) times daily. As needed for diarrhea    donepezil (ARICEPT) 5 MG tablet Take 1 tablet (5 mg total) by mouth every evening. For memory    escitalopram oxalate (LEXAPRO) 20 MG tablet Take 1 tablet (20 mg total) by mouth once daily.    lisinopril (PRINIVIL,ZESTRIL) 20 MG tablet Take 1 tablet (20 mg total) by mouth once daily.    metoprolol succinate (TOPROL-XL) 50 MG 24 hr tablet Take 50 mg by mouth once daily.    tolterodine (DETROL) 1 MG Tab Take 1 tablet (1 mg total) by mouth 2 (two) times daily.    traMADol (ULTRAM) 50 mg tablet Take 1 tablet (50 mg total) by mouth every 24 hours as needed for Pain.    quetiapine (SEROQUEL) 25 MG Tab Take 1 tablet (25 mg total) by mouth every evening.     Family History     None        Social History Main Topics    Smoking status: Former Smoker     Types: Cigarettes    Smokeless tobacco: Never Used      Comment: quit 25 years ago    Alcohol use No    Drug use: No    Sexual activity: No     Review of Systems   Unable to perform ROS: Acuity of  condition (Denies complaints excpet Hip Pain. ROS very Limited. )     Objective:     Vital Signs (Most Recent):  Temp: 96.5 °F (35.8 °C) (03/19/18 1620)  Pulse: 62 (03/19/18 1717)  Resp: 16 (03/19/18 1620)  BP: (!) 116/56 (03/19/18 1717)  SpO2: 98 % (03/19/18 1717) Vital Signs (24h Range):  Temp:  [96.5 °F (35.8 °C)] 96.5 °F (35.8 °C)  Pulse:  [62] 62  Resp:  [16] 16  SpO2:  [95 %-98 %] 98 %  BP: ()/(50-56) 116/56     Weight: 63 kg (139 lb)  Body mass index is 21.13 kg/m².    Physical Exam   Constitutional: She appears lethargic. She appears cachectic. She is easily aroused. She has a sickly appearance. No distress. Face mask in place.   HENT:   Head: Normocephalic and atraumatic.   Mouth/Throat: No oropharyngeal exudate.   Oropharynx pale and dry   Eyes: Conjunctivae are normal. Pupils are equal, round, and reactive to light. No scleral icterus.   Neck: Neck supple.   Cardiovascular: Normal rate, regular rhythm, normal heart sounds and intact distal pulses.  Exam reveals no gallop and no friction rub.    No murmur heard.  All fingertips cool and cyanotic   Pulmonary/Chest: Effort normal and breath sounds normal. No respiratory distress. She has no wheezes. She has no rales.   Abdominal: Soft. Bowel sounds are normal. She exhibits no distension. There is no tenderness. There is no rebound and no guarding.   Musculoskeletal: She exhibits tenderness (Right Hip ) and deformity (Contractures). She exhibits no edema.   Heal Protectors in Place with heal decubiti   Neurological: She is easily aroused. She appears lethargic. She exhibits normal muscle tone.   Skin: Skin is warm and dry. Capillary refill takes 2 to 3 seconds. Rash (Sacral) noted. She is not diaphoretic. There is pallor.   Stage 2 and # decubiti on Buttocks   Psychiatric: She has a normal mood and affect. Her behavior is normal.   Nursing note and vitals reviewed.        CRANIAL NERVES     CN III, IV, VI   Pupils are equal, round, and reactive to  light.       Significant Labs:   ABGs: No results for input(s): PH, PCO2, HCO3, POCSATURATED, BE, TOTALHB, COHB, METHB, O2HB, POCFIO2 in the last 48 hours.  CBC:   Recent Labs  Lab 03/19/18  1821   WBC 32.08*   HGB 10.4*   HCT 32.1*   *     CMP:   Recent Labs  Lab 03/19/18  1821      K 5.1      CO2 14*      *   CREATININE 2.5*   CALCIUM 10.2   PROT 8.1   ALBUMIN 2.7*   BILITOT 0.3   ALKPHOS 170*   AST 64*   ALT 48*   ANIONGAP 18*   EGFRNONAA 17*       Lactic Acid:   Recent Labs  Lab 03/19/18  1821   LACTATE 1.4     : Urine Studies:   Recent Labs  Lab 03/19/18  1856   COLORU Yellow   APPEARANCEUA Cloudy*   PHUR 8.0   SPECGRAV 1.015   PROTEINUA 2+*   GLUCUA Negative   KETONESU Negative   BILIRUBINUA Negative   OCCULTUA 2+*   NITRITE Negative   UROBILINOGEN Negative   LEUKOCYTESUR 3+*   RBCUA 0   WBCUA >100*   BACTERIA Many*   SQUAMEPITHEL 0   HYALINECASTS 0       Significant Imaging: I have reviewed all pertinent imaging results/findings within the past 24 hours.   Imaging Results    None

## 2018-03-20 NOTE — ASSESSMENT & PLAN NOTE
Patient appears depressed, but states that she is not sad. Continue home escitalopram if able to take PO.

## 2018-03-20 NOTE — ASSESSMENT & PLAN NOTE
H/O CVA (without residual effects)  SBP . Holding home metoprolol and lisinopril while not eating or drinking.

## 2018-03-20 NOTE — PLAN OF CARE
Problem: SLP Goal  Goal: SLP Goal  Short Term Goals:  1. Pt will participate in ongoing swallow assessment to determine least restrictive diet.     Outcome: Ongoing (interventions implemented as appropriate)  Attempted swallow eval with minimal success, pt holding tsp of water in oral cavity for over 2 minutes, max cues to externally remove water. Pt with very minimal participation in PO trials.

## 2018-03-20 NOTE — ED NOTES
Diaper changed , large loose brown stool . pericare given , pt moans and says wait when you move her

## 2018-03-20 NOTE — PLAN OF CARE
Problem: Patient Care Overview  Goal: Plan of Care Review  Outcome: Ongoing (interventions implemented as appropriate)  Pt is in bed resting with no S/S of pain or distress . Went over plan of care with pt's son, verbalized understanding. She is on telemetry, NSR, with HR in 80s and 90s. She has been sleeping all day. Pt has IV fluids going at 125ml/hr. She is currently NPO. Pt is stable and will continue to monitor. Will give report to oncoming nurse.

## 2018-03-20 NOTE — ASSESSMENT & PLAN NOTE
Fluid Volume Depletion  87 year old female, DNR status has chosen not to eat or drink.  Sodium 138, CO2 14,  (up from 15), Cr 2.5 (up from 0.8),   --IV Fluids  --Strict Intake and Output, Avoid nephrotoxins, and renally dose medications.

## 2018-03-20 NOTE — ASSESSMENT & PLAN NOTE
Fluid Volume Depletion  Cr 2.5 > 1.9 with IVF.  Pt refusing food or drink. Family requests Hospice at nursing home.  Avoid nephrotoxins and renally dose medications. Monitor until hospice arranged.

## 2018-03-20 NOTE — ASSESSMENT & PLAN NOTE
Nursing Home Resident  Adult Failure to Thrive  Buttocks Decubiti Stage 2 and 3  From [facility], since January 2018. Patient stopped eating and drinking when she found out she would not be leaving nursing home.  Patient's son requesting Hospice Consult for NH. Seen by PT/OT who note patient appropriate for group home care only.  ST recommending NPO while patient refusing to swallow.  Turn every 2 hours with Decubiti care per nursing protocol.

## 2018-03-20 NOTE — PT/OT/SLP EVAL
Speech Language Pathology Evaluation  Bedside Swallow    Patient Name:  Oksana Estrada   MRN:  [id number]  Admitting Diagnosis: SYBIL (acute kidney injury)    Recommendations:                 General Recommendations:  Family discussion with MD re: long term goals   Diet recommendations:  NPO, NPO   Aspiration Precautions: aspiration precautions due to bolus holding not accepting oral PO   General Precautions: Standard, fall, NPO  Communication strategies:  provide increased time to answer    History:     Past Medical History:   Diagnosis Date    Hx of psychiatric care     Hypertension     Stroke        Past Surgical History:   Procedure Laterality Date    BREAST SURGERY      lumpectomy left breast    JOINT REPLACEMENT      bilateral knee replacement   Chief Complaint:        Chief Complaint   Patient presents with    Failure To Thrive       brought in from [facility] for failure to thrive. pt has been refusing po intake over the last few days. pt was placed in [facility] after a hip fx a few months ago.          HPI: Oksana Estrada is a 87 year old female with a PMHx of dementia, cognitive communication deficit, hypertension, hyperlipidemia, anemia of chronic disease, CVA without residual effects, overactive bladder, urinary retention, depression, and Right femur fracture.  She is a resident of [facility].  She has pressure sores and contractures. She is a DNR.       She was brought to [facility]  ED from [facility] for failure to thrive. Pt was placed in [facility] in January 2018 after in ability to rehab due to a hip fracture in December 2017. A few days ago she learned that she would not be returning home, and she began refusing PO intake. She denies any pain except for some right hip pain.  All of her fingers tips are cool and cyanotic. +1 radial pulses. Her mouth and skin is dry and pale.  ED workup revealed WBC 32, PLTS and Hgb/Hct above baseline, Sodium 138, CO2  "14,  (up from 15), Cr 2.5 (up from 0.8), Alk Phos 170, AST 64, ALT 48.  Her urine shows UTI with WBC > 100.  She is severely volume depleted, as to be expected.  She was treated with Ciprofloxacin, as she has an allergy to PNC, and was given 1.5 liters of IV Normal Saline.  She is admitted to [facility] for Acute Kidney Injury.     Upon interview, patient is frail and withdrawn.  She denies feeling sad.  I spoke with patient's son, Michele Estrada (his wife is a Nurse Practitioner) about goals of care.  They would likely not opt for tube feedings or other heroic measures.  He amiable about discussing Hospice care.          Social History: Patient lives at [facility].     Prior diet: Refusing PO since learning she cannot return home.     Subjective     Consult received for clinical swallow eval this date, SLP did communicate with RN prior to eval/treat.    Patient goals: "I don't know" when asked what she wanted to do.     Pain/Comfort:  · Pain Rating 1:  (no signs of pain)    Objective:     Pt seen for swallow eval s/p PT/OT eval.   Pt with flat affect, minimal verbalizations, delay in response time.   Minimal participation in oral motor following.   Pt did not want to accept tsp of water.     Oral Musculature Evaluation  · Oral Musculature: unable to assess due to poor participation/comprehension  · Volitional Cough: not elicited  · Volitional Swallow: delayed swallow oral and pharyngeal  · Voice Prior to PO Intake: low volume    Bedside Swallow Eval: limited trials due to pt refusing. She finally allowed one tsp of water in oral cavity.   Consistencies Assessed:  · Thin liquids water by tsp only     Oral Phase:   · Poor oral acceptance, significant bolus holding for over 2 minutes, had to externally remove, no initiation to swallow water    Pharyngeal Phase:   · No trigger or attempt to swallow water    Treatment: discussed with MD concern for pt holding water and refusing " further trials.     Assessment:     Oksana Estrada is a 87 y.o. female with an SLP diagnosis of failure to thrive, refusing PO, significant delay and holding of oral water in oral cavity. Pt not safe for oral diet.     Goals:    SLP Goals        Problem: SLP Goal    Goal Priority Disciplines Outcome   SLP Goal     SLP Ongoing (interventions implemented as appropriate)   Description:  Short Term Goals:  1. Pt will participate in ongoing swallow assessment to determine least restrictive diet.                       Plan:     · Patient to be seen:  2 x/week   · Plan of Care expires:  04/09/18  · Plan of Care reviewed with:  patient (MD)   · SLP Follow-Up:  Yes       Discharge recommendations:   (pending discussion with MD, possible hospice?)       Time Tracking:     SLP Treatment Date:   03/20/18  Speech Start Time:  1015  Speech Stop Time:  1024     Speech Total Time (min):  9 min    Billable Minutes: Eval Swallow and Oral Function 9    GOPAL Galvan, CCC-SLP  03/20/2018

## 2018-03-20 NOTE — PT/OT/SLP EVAL
Occupational Therapy   Evaluation    Name: Oksana Estrada  MRN: [id number]  Admitting Diagnosis:  SYBIL (acute kidney injury)      Recommendations:     Discharge Recommendations:  (per MD; return to NH long term )  Discharge Equipment Recommendations:  none  Barriers to discharge:  None    History:     Occupational Profile:  Living Environment: Pt minimally conversational throughout session. Per NH staff, pt was total A for all axs, except able to feed herself. Recently d/c from SNF to basic NH 2/2 limited progression in therapy  Previous level of function: total A except for feeding  Equipment Owned:  wheelchair, hospital bed  Assistance upon Discharge: from NH staff     Past Medical History:   Diagnosis Date    Hx of psychiatric care     Hypertension     Stroke        Past Surgical History:   Procedure Laterality Date    BREAST SURGERY      lumpectomy left breast    JOINT REPLACEMENT      bilateral knee replacement       Subjective     Chief Complaint: pain; yelling out with all movement and attempts at repositioned   Patient/Family stated goals: none stated from pt   Communicated with: nsg prior to session.  Pain/Comfort:  · Pain Rating 1:  (did not rate; yells out when moved UE/LEs)    Patients cultural, spiritual, Holiness conflicts given the current situation:      Objective:     Patient found with:  (R side lying )    General Precautions: Standard, fall, NPO   Orthopedic Precautions:RLE non weight bearing (per NH, pt was NWB R hip)   Braces: N/A     Occupational Performance:    Bed Mobility:    · Patient completed Rolling/Turning to Left with  total assistance and 2 persons  · Patient completed Rolling/Turning to Right with total assistance and 2 persons  · Patient completed Scooting/Bridging with total assistance and 2 persons    Functional Mobility/Transfers:  · CANNOT PERFORM     Activities of Daily Living:  · LB Dressing: total assistance    · Toileting: total assistance     Cognitive/Visual  Perceptual:  Cognitive/Psychosocial Skills:     -       Oriented to: Person   -       Follows Commands/attention:Inattentive, Easily distracted and Follows one-step commands  -       Communication: limited   -       Memory: Impaired STM, Impaired LTM and Poor immediate recall  -       Safety awareness/insight to disability: impaired   -       Mood/Affect/Coping skills/emotional control: Labile    Physical Exam:  Balance:    -       unable to perform sitting EOB and /or standing   Skin integrity: Wound sacrum- mepilex; healed surgery scar R hip   Upper Extremity Range of Motion:   RUE PROM limited at end shoulder range; LUE limited at shoulder, unable to accurately assess 2/2 pain  Upper Extremity Strength:  Pt did not actively participate in MMT; did hold onto bed rail when turned    Strength:  Poor; did not follow commands to participate   Fine Motor Coordination: did not participate     Patient left left sidelying with all lines intact, call button in reach, bed alarm on and nsg notified    [facility] 6 Click:  [facility] Total Score: 6    Treatment & Education:  Pt unable to tolerate treatment this date  Education:    Assessment:     Oksana Estrada is a 87 y.o. female with a medical diagnosis of SYBIL (acute kidney injury).  She presents with failure to thrive.  Performance deficits affecting function are weakness, gait instability, impaired balance, impaired endurance, impaired self care skills, impaired functional mobilty, decreased lower extremity function, decreased upper extremity function, decreased ROM, impaired cognition, pain, impaired skin, orthopedic precautions, decreased coordination, edema, decreased safety awareness, impaired coordination.  Pt would benefit from cont OT services in order to maximize functional independence. Recommendations ongoing at this time. Per NH, pt was d/c from SNF and now long-term 2/2 pt not progressing with therapy. NH staff did report pt able to feed herself until 4 days  "prior to hospital admission. Will progress pt as able. Per chart, speaking about hospice     Rehab Prognosis:  Poor +; patient would benefit from acute skilled OT services to address these deficits and reach maximum level of function.         Clinical Decision Makin.  OT Low:  "Pt evaluation falls under low complexity for evaluation coding due to performance deficits noted in 1-3 areas as stated above and 0 co-morbities affecting current functional status. Data obtained from problem focused assessments. No modifications or assistance was required for completion of evaluation. Only brief occupational profile and history review completed."     Plan:     Patient to be seen 3 x/week to address the above listed problems via self-care/home management, therapeutic activities, therapeutic exercises  · Plan of Care Expires: 18  · Plan of Care Reviewed with: patient    This Plan of care has been discussed with the patient who was involved in its development and understands and is in agreement with the identified goals and treatment plan    GOALS:    Occupational Therapy Goals        Problem: Occupational Therapy Goal    Goal Priority Disciplines Outcome Interventions   Occupational Therapy Goal     OT, PT/OT Ongoing (interventions implemented as appropriate)    Description:  Goals to be met by: 18     Patient will increase functional independence with ADLs by performing:    Feeding with Minimal Assistance.  Sitting at edge of bed x10 minutes with Moderate Assistance.  Rolling to Bilateral with Moderate Assistance.   Supine to sit with Moderate Assistance.  Increased functional strength to 3/5 for self care skills and functional mobility.  Upper extremity exercise program x10 reps per handout, with assistance as needed.                      Time Tracking:     OT Date of Treatment: 18  OT Start Time: 1000  OT Stop Time: 1016  OT Total Time (min): 16 min    Billable Minutes:Evaluation 16    Michelle TELLEZ " Aylin OT  3/20/2018

## 2018-03-20 NOTE — PROGRESS NOTES
[facility] Hospital Medicine  Progress Note    Patient Name: Oksana Estrada  MRN: [id number]  Patient Class: IP- Inpatient   Admission Date: 3/19/2018  Length of Stay: 1 days  Attending Physician: Smith Conteh MD  Primary Care Provider: José Ramos MD        Subjective:     Principal Problem:SYBIL (acute kidney injury)    HPI:  Oksana Estrada is a 87 year old female with dementia, cognitive communication deficit, hypertension, hyperlipidemia, anemia of chronic disease, CVA without residual effects, overactive bladder, urinary retention, depression, and Right femur fracture.  She is a resident of [facility].  She has pressure sores and contractures. She is DNR.      She was brought to [facility] ED on 3/19/2018 from [facility] for failure to thrive. Pt was placed in [facility] in January 2018 after inability to rehab due to a hip fracture in December 2017. A few days ago, she learned that she would not be returning home, and she began refusing PO intake. She denies any pain except for some right hip pain.  All of her fingers tips are cool and cyanotic. +1 radial pulses. Her mouth and skin is dry and pale.  ED workup revealed WBC 32, PLTS and Hgb/Hct above baseline, Sodium 138, CO2 14,  (up from 15), Cr 2.5 (up from 0.8), Alk Phos 170, AST 64, ALT 48.  Her urine shows UTI with WBC > 100.  She is severely volume depleted.  She was treated with Ciprofloxacin, as she has an allergy to penicillin, and was given 1.5 liters of IV Normal Saline.  She is admitted to [facility] Medicine for Acute Kidney Injury.    Upon interview, patient is frail and withdrawn.  She denies feeling sad.  I spoke with patient's son, Michele Estrada (his wife is a Nurse Practitioner) about goals of care.  They would likely not opt for tube feedings or other heroic measures.  He amiable about discussing Hospice care.       Hospital Course:  WBC 32K >> 23K.  Creatinine 2.5  >> 1.9.  Case Management confirmed Hospice consult with son.  Seen by Psychiatry during January 2018 admission who recommended labs and medication changes not done in [facility] system.  Ordered AM labs.      Interval History: Pt minimally responsive.  No family present.     Review of Systems   Unable to perform ROS: Acuity of condition     Objective:     Vital Signs (Most Recent):  Temp: 97.8 °F (36.6 °C) (03/20/18 1219)  Pulse: 91 (03/20/18 1219)  Resp: 20 (03/20/18 1219)  BP: (!) 128/58 (03/20/18 1219)  SpO2: 97 % (03/20/18 0755) Vital Signs (24h Range):  Temp:  [96.5 °F (35.8 °C)-98.3 °F (36.8 °C)] 97.8 °F (36.6 °C)  Pulse:  [60-91] 91  Resp:  [14-20] 20  SpO2:  [95 %-99 %] 97 %  BP: ()/(50-66) 128/58     Weight: 63 kg (139 lb)  Body mass index is 21.13 kg/m².    Intake/Output Summary (Last 24 hours) at 03/20/18 1429  Last data filed at 03/20/18 1400   Gross per 24 hour   Intake                0 ml   Output                0 ml   Net                0 ml      Physical Exam   Constitutional: No distress.   HENT:   Dry, cracked lips and dry mucous membranes   Cardiovascular: Normal rate and regular rhythm.    Pulmonary/Chest: Effort normal and breath sounds normal. No respiratory distress. She has no wheezes.   Abdominal: Soft. There is no tenderness.   Musculoskeletal: She exhibits no edema.   Neurological: She is alert.   Wakes up, mumbles. Does not move right leg.     Psychiatric:   Flat/depressed affect.    Nursing note and vitals reviewed.      Significant Labs:   BMP:   Recent Labs  Lab 03/20/18  0419   *      K 4.2   *   CO2 15*   *   CREATININE 1.9*   CALCIUM 9.2   MG 2.2     CBC:   Recent Labs  Lab 03/19/18  1821 03/20/18  0419   WBC 32.08* 22.83*   HGB 10.4* 10.2*   HCT 32.1* 32.1*   * 504*     Magnesium:   Recent Labs  Lab 03/20/18  0419   MG 2.2       Significant Imaging: no new    Assessment/Plan:      * SYBIL (acute kidney injury)    Fluid Volume Depletion  Cr 2.5 >  1.9 with IVF.  Pt refusing food or drink. Family requests Hospice at nursing home.  Avoid nephrotoxins and renally dose medications. Monitor until hospice arranged.           Leukocytosis    WBC 32K >> 23K today. Suspect from dehydration and UTI.  Afebrile. Monitor. Continue IVF.           Major depressive disorder, recurrent episode    Patient appears depressed, but states that she is not sad. Continue home escitalopram if able to take PO.  Seen by Psychiatry 1/22/18 during last hospitalization who recommended labs and medication changes.  I ordered labs for the morning. Will discuss medication changes with son as patient not taking anything oral at this time.             Dementia without behavioral disturbance    Continue home donepezil 5 mg daily if able to take po.         Essential hypertension    H/O CVA (without residual effects)  SBP . Holding home metoprolol and lisinopril while not eating or drinking.          Hyperlipidemia    Continue home atorvastatin 20 mg daily            Anemia of chronic disease    Appears baseline hgb is ~11.  Hgb 10.2 today. No visible, active bleeding. Monitor.          DNR (do not resuscitate)    Nursing Home Resident  Adult Failure to Thrive  Buttocks Decubiti Stage 2 and 3  From [facility], since January 2018. Patient stopped eating and drinking when she found out she would not be leaving nursing home.  Patient's son requesting Hospice Consult for NH. Seen by PT/OT who note patient appropriate for long term care only.  ST recommending NPO while patient refusing to swallow.  Turn every 2 hours with Decubiti care per nursing protocol.                VTE Risk Mitigation         Ordered     IP VTE HIGH RISK PATIENT  Once      03/19/18 2317     Place VENUS hose  Until discontinued      03/19/18 2317     Place sequential compression device  Until discontinued      03/19/18 2317              Rekha Maya PA-C  Department of Hospital Medicine   [facility]  Pager: [phone]

## 2018-03-20 NOTE — ASSESSMENT & PLAN NOTE
Nursing Home Resident  Adult Failure to Thrive  Buttocks Decubiti Stage 2 and 3  From [facility], since January 2018. Patient stopped eating and drinking when she found out she would not be leaving nursing.  Patient's son state they would likely not opt for heroic measures.  --Hospice Consult  --PT/OT  --Nursing Swallow assessment before attempting PO Medications  --Turn every 2 hours  --Decubiti care per nursing

## 2018-03-20 NOTE — PLAN OF CARE
Plan of care reviewed with patient.Fall precautions maintained. Bed in lowest position, locked, call light within reach and bed alarm is on. Side rails up x's 2 with slip resistant socks on. Nurse attempted swallow study with patient. Patient demonstrated extreme swallowing delay. The patient is very reserved, not speaking much. Nurse held oral meds. Nurse assessed patient throughout the shift for needs Nurse re-position the patient to prevent further skin break down. Patient on telemetry throughout shift. Will continue to monitor.

## 2018-03-20 NOTE — HPI
Oksana Estrada is a 87 year old female with dementia, cognitive communication deficit, hypertension, hyperlipidemia, anemia of chronic disease, CVA without residual effects, overactive bladder, urinary retention, depression, and Right femur fracture.  She is a resident of [facility].  She has pressure sores and contractures. She is DNR.      She was brought to [facility] ED on 3/19/2018 from [facility] for failure to thrive. Pt was placed in [facility] in January 2018 after inability to rehab due to a hip fracture in December 2017. Son states that patient changed after her first surgery for right hip fracture.  She needed a second surgery when the hip broke again during Rehab and was placed on strict non-weight-bearing restrictions.  A few days ago, she learned that she would not be returning home, and she began refusing PO intake. She denies any pain except for some right hip pain.  All of her fingers tips are cool and cyanotic. +1 radial pulses. Her mouth and skin is dry and pale.  ED workup revealed WBC 32, PLTS and Hgb/Hct above baseline, Sodium 138, CO2 14,  (up from 15), Cr 2.5 (up from 0.8), Alk Phos 170, AST 64, ALT 48.  Her urine shows UTI with WBC > 100.  She is severely volume depleted.  She was treated with Ciprofloxacin, as she has an allergy to penicillin, and was given 1.5 liters of IV Normal Saline.  She was admitted to [facility] Medicine for Acute Kidney Injury.    Upon interview, patient is frail and withdrawn.  She denies feeling sad.  I spoke with patient's son, Michele Estrada (his wife is a Nurse Practitioner) about goals of care.  They are amiable about discussing Hospice care.

## 2018-03-20 NOTE — PLAN OF CARE
"Problem: Physical Therapy Goal  Goal: Physical Therapy Goal  Goals to be met by: 18     Patient will increase functional independence with mobility by performin. Supine <> sit with Moderate Assistance  2. Sit to stand transfer with Moderate Assistance x 2  3. Bed to chair transfer with Maximum Assistance x 2 and maintaining weight-bearing precaution(s)   4. Sitting at edge of bed x 15 minutes with Minimal Assistance    Outcome: Ongoing (interventions implemented as appropriate)  PT evaluation completed and was limited to bed level activities as pt saying "ouch" and not providing assist with bed mobility. Recommending return to NH [facility]. Will follow pt during IP stay for 3x/week.      "

## 2018-03-21 LAB
25(OH)D3+25(OH)D2 SERPL-MCNC: 24 NG/ML
ANION GAP SERPL CALC-SCNC: 10 MMOL/L
BASOPHILS # BLD AUTO: 0.02 K/UL
BASOPHILS NFR BLD: 0.1 %
BUN SERPL-MCNC: 66 MG/DL
CALCIUM SERPL-MCNC: 8.5 MG/DL
CHLORIDE SERPL-SCNC: 124 MMOL/L
CO2 SERPL-SCNC: 15 MMOL/L
CREAT SERPL-MCNC: 1.1 MG/DL
DIFFERENTIAL METHOD: ABNORMAL
EOSINOPHIL # BLD AUTO: 0 K/UL
EOSINOPHIL NFR BLD: 0.1 %
ERYTHROCYTE [DISTWIDTH] IN BLOOD BY AUTOMATED COUNT: 16.9 %
EST. GFR  (AFRICAN AMERICAN): 52 ML/MIN/1.73 M^2
EST. GFR  (NON AFRICAN AMERICAN): 45 ML/MIN/1.73 M^2
GLUCOSE SERPL-MCNC: 97 MG/DL
HCT VFR BLD AUTO: 27.8 %
HGB BLD-MCNC: 8.7 G/DL
LYMPHOCYTES # BLD AUTO: 0.8 K/UL
LYMPHOCYTES NFR BLD: 4.9 %
MAGNESIUM SERPL-MCNC: 1.8 MG/DL
MCH RBC QN AUTO: 28.7 PG
MCHC RBC AUTO-ENTMCNC: 31.3 G/DL
MCV RBC AUTO: 92 FL
MONOCYTES # BLD AUTO: 0.7 K/UL
MONOCYTES NFR BLD: 4.6 %
NEUTROPHILS # BLD AUTO: 13.6 K/UL
NEUTROPHILS NFR BLD: 88.5 %
PHOSPHATE SERPL-MCNC: 2.4 MG/DL
PLATELET # BLD AUTO: 467 K/UL
PMV BLD AUTO: 9.4 FL
POTASSIUM SERPL-SCNC: 3.4 MMOL/L
RBC # BLD AUTO: 3.03 M/UL
SODIUM SERPL-SCNC: 149 MMOL/L
TSH SERPL DL<=0.005 MIU/L-ACNC: 3.05 UIU/ML
VIT B12 SERPL-MCNC: 968 PG/ML
WBC # BLD AUTO: 15.4 K/UL

## 2018-03-21 PROCEDURE — 85025 COMPLETE CBC W/AUTO DIFF WBC: CPT

## 2018-03-21 PROCEDURE — 84425 ASSAY OF VITAMIN B-1: CPT

## 2018-03-21 PROCEDURE — 63600175 PHARM REV CODE 636 W HCPCS: Performed by: NURSE PRACTITIONER

## 2018-03-21 PROCEDURE — 36415 COLL VENOUS BLD VENIPUNCTURE: CPT

## 2018-03-21 PROCEDURE — G8998 SWALLOW D/C STATUS: HCPCS | Mod: CN

## 2018-03-21 PROCEDURE — 94760 N-INVAS EAR/PLS OXIMETRY 1: CPT

## 2018-03-21 PROCEDURE — 11000001 HC ACUTE MED/SURG PRIVATE ROOM

## 2018-03-21 PROCEDURE — 94761 N-INVAS EAR/PLS OXIMETRY MLT: CPT

## 2018-03-21 PROCEDURE — 82607 VITAMIN B-12: CPT

## 2018-03-21 PROCEDURE — 92526 ORAL FUNCTION THERAPY: CPT

## 2018-03-21 PROCEDURE — 82306 VITAMIN D 25 HYDROXY: CPT

## 2018-03-21 PROCEDURE — 83735 ASSAY OF MAGNESIUM: CPT

## 2018-03-21 PROCEDURE — 25000003 PHARM REV CODE 250: Performed by: NURSE PRACTITIONER

## 2018-03-21 PROCEDURE — 84443 ASSAY THYROID STIM HORMONE: CPT

## 2018-03-21 PROCEDURE — 84100 ASSAY OF PHOSPHORUS: CPT

## 2018-03-21 PROCEDURE — 80048 BASIC METABOLIC PNL TOTAL CA: CPT

## 2018-03-21 PROCEDURE — 63600175 PHARM REV CODE 636 W HCPCS: Performed by: PHYSICIAN ASSISTANT

## 2018-03-21 PROCEDURE — G8997 SWALLOW GOAL STATUS: HCPCS | Mod: CN

## 2018-03-21 RX ORDER — MORPHINE SULFATE 4 MG/ML
1 INJECTION, SOLUTION INTRAMUSCULAR; INTRAVENOUS EVERY 6 HOURS PRN
Status: DISCONTINUED | OUTPATIENT
Start: 2018-03-21 | End: 2018-03-21

## 2018-03-21 RX ORDER — PROCHLORPERAZINE 25 MG
25 SUPPOSITORY, RECTAL RECTAL EVERY 12 HOURS PRN
Status: DISCONTINUED | OUTPATIENT
Start: 2018-03-21 | End: 2018-03-22 | Stop reason: HOSPADM

## 2018-03-21 RX ORDER — ACETAMINOPHEN 650 MG/1
650 SUPPOSITORY RECTAL EVERY 6 HOURS PRN
Status: DISCONTINUED | OUTPATIENT
Start: 2018-03-21 | End: 2018-03-22 | Stop reason: HOSPADM

## 2018-03-21 RX ADMIN — SODIUM CHLORIDE: 0.9 INJECTION, SOLUTION INTRAVENOUS at 05:03

## 2018-03-21 RX ADMIN — CIPROFLOXACIN 400 MG: 2 INJECTION, SOLUTION INTRAVENOUS at 08:03

## 2018-03-21 RX ADMIN — ENOXAPARIN SODIUM 30 MG: 100 INJECTION SUBCUTANEOUS at 05:03

## 2018-03-21 NOTE — PLAN OF CARE
[facility]  Daquan Michele MD, [facility]   MD Keara Connor PA-C Renee Melancon, PA-C Rosanne Zeringue, [address] [address]  Office Phone: [phone]  Office Fax: [phone]                                     HOSPICE  ORDERS     Patient Name: Oksana Estrada  YOB: 1930    03/22/2018    Admit to Hospice:  [facility]      Diagnoses:  Active Hospital Problems    Diagnosis  POA    *SYBIL (acute kidney injury) [N17.9]  Yes     Priority: 1 - High    Leukocytosis [D72.829]  Yes     Priority: 2     Major depressive disorder, recurrent episode [F33.9]  Yes     Priority: 3     Dementia without behavioral disturbance [F03.90]  Yes     Priority: 3      Chronic    Essential hypertension [I10]  Yes     Priority: 4      Chronic    Hyperlipidemia [E78.5]  Yes     Priority: 5      Chronic    Anemia of chronic disease [D63.8]  Yes     Priority: 6      Chronic    DNR (do not resuscitate) [Z66]  Yes     Priority: 7     Failure to thrive in adult [R62.7]  Yes    Nursing home resident [Z59.3]  Not Applicable    Fluid volume depletion [E86.9]  Yes    Decubitus ulcer of buttock, stage 2 [L89.302]  Yes    History of CVA (cerebrovascular accident) [Z86.73]  Not Applicable     Chronic      Resolved Hospital Problems    Diagnosis Date Resolved POA   No resolved problems to display.       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        Patient has a life expectancy < 6 months due to these conditions.    Vital Signs: Routine per Hospice Protocol.    Allergies:  Review of patient's allergies indicates:   Allergen Reactions    Codeine     Penicillins        Diet: pleasure feeding with small sips of liquid and pureed food      Activities: activity as tolerated    Nursing: Per Hospice Routine    Future Orders:  Hospice Medical Director may dictate new orders for  comfortable care measures & sign death certificate.    Medications:         Comfort Care Medications Only        There are no discharge medications for this patient.

## 2018-03-21 NOTE — ASSESSMENT & PLAN NOTE
Continue home escitalopram if able to take PO.  Seen by Psychiatry 1/22/18 during last hospitalization who recommended labs and medication changes.  I ordered labs for this morning which are pending.  Son states she was seen by Psychiatry 2-3 weeks ago at the NH and medication was changed.

## 2018-03-21 NOTE — ASSESSMENT & PLAN NOTE
H/O CVA (without residual effects)  -131. Holding home metoprolol and lisinopril while not eating or drinking.

## 2018-03-21 NOTE — PT/OT/SLP PROGRESS
"Speech Language Pathology Treatment    Patient Name:  Oksana Estrada   MRN:  [id number]  Admitting Diagnosis: SYBIL (acute kidney injury)    Recommendations:     Diet recommendations:  NPO, Liquid Diet Level:  (free water as tolerated) Aspiration Precautions: frequent oral care, upright for water trials, watch for delay, DC if pt holding water in oral cavity.    General Precautions: Standard, aspiration, fall, NPO    Subjective     Pt seen at bedside for ongoing swallow eval. Pt sleeping but did arouse.   Patient goals: none stated      Pain/Comfort:  · Pain Rating 1: 0/10    Objective:     Has the patient been evaluated by SLP for swallowing?   Yes  Keep patient NPO? Yes   Current Respiratory Status: room air      Pt seen at bedside for water trials. No family at bedside. Pt did arouse when found sleeping. Pt requested water and stated "I do not want to be here."  Pt not oriented to self or situation. Pt presented with tsp swallows of water x5, accepted 4/5 with delay in oral phase and holding on last swallow. Loud, audible gulp noted. Pt refused pudding trials.   Rec: water sparingly for comfort, SLP did notify PA.   DC plan is still hospice.     Assessment:     Oksana Estrada is a 87 y.o. female with an SLP diagnosis of failure to thrive, poor PO intake.     Goals:    SLP Goals        Problem: SLP Goal    Goal Priority Disciplines Outcome   SLP Goal     SLP Ongoing (interventions implemented as appropriate)   Description:  Short Term Goals:  1. Pt will participate in ongoing swallow assessment to determine least restrictive diet.                       Plan:     · Patient to be seen:  2 x/week   · Plan of Care expires:  04/09/18  · Plan of Care reviewed with:  patient (PA)   · SLP Follow-Up:  No         Time Tracking:     SLP Treatment Date:   03/21/18  Speech Start Time:  0930  Speech Stop Time:  0942     Speech Total Time (min):  12 min    Billable Minutes: Treatment Swallowing Dysfunction " 12    GOPAL Galvan, CCC-SLP  03/21/2018

## 2018-03-21 NOTE — PT/OT/SLP DISCHARGE
Physical Therapy Discharge Summary    Name: Oksana Estrada  MRN: [id number]   Principal Problem: SYBIL (acute kidney injury)     Patient Discharged from acute Physical Therapy on 3/21/18.  Please refer to prior PT noted date on 3/20/18 for functional status.     Assessment:     PA-C discontinued orders and orders placed for hospice    Objective:     GOALS:    Physical Therapy Goals        Problem: Physical Therapy Goal    Goal Priority Disciplines Outcome Goal Variances Interventions   Physical Therapy Goal     PT/OT, PT Ongoing (interventions implemented as appropriate)     Description:  Goals to be met by: 18     Patient will increase functional independence with mobility by performin. Supine <> sit with Moderate Assistance  2. Sit to stand transfer with Moderate Assistance x 2  3. Bed to chair transfer with Maximum Assistance x 2 and maintaining weight-bearing precaution(s)   4. Sitting at edge of bed x 15 minutes with Minimal Assistance                      Reasons for Discontinuation of Therapy Services  Referral placed for Hospice      Plan:     Patient Discharged to: Pt still in acute hospital at this time.    Emani Bradshaw, PT  3/21/2018

## 2018-03-21 NOTE — PROGRESS NOTES
[facility] Hospital Medicine  Progress Note    Patient Name: Oksana Estrada  MRN: [id number]  Patient Class: IP- Inpatient   Admission Date: 3/19/2018  Length of Stay: 2 days  Attending Physician: Smith Conteh MD  Primary Care Provider: José Ramos MD        Subjective:     Principal Problem:SYBIL (acute kidney injury)    HPI:  Oksana Estrada is a 87 year old female with dementia, cognitive communication deficit, hypertension, hyperlipidemia, anemia of chronic disease, CVA without residual effects, overactive bladder, urinary retention, depression, and Right femur fracture.  She is a resident of [facility].  She has pressure sores and contractures. She is DNR.      She was brought to [facility] ED on 3/19/2018 from [facility] for failure to thrive. Pt was placed in [facility] in January 2018 after inability to rehab due to a hip fracture in December 2017. A few days ago, she learned that she would not be returning home, and she began refusing PO intake. She denies any pain except for some right hip pain.  All of her fingers tips are cool and cyanotic. +1 radial pulses. Her mouth and skin is dry and pale.  ED workup revealed WBC 32, PLTS and Hgb/Hct above baseline, Sodium 138, CO2 14,  (up from 15), Cr 2.5 (up from 0.8), Alk Phos 170, AST 64, ALT 48.  Her urine shows UTI with WBC > 100.  She is severely volume depleted.  She was treated with Ciprofloxacin, as she has an allergy to penicillin, and was given 1.5 liters of IV Normal Saline.  She is admitted to [facility] for Acute Kidney Injury.    Upon interview, patient is frail and withdrawn.  She denies feeling sad.  I spoke with patient's son, Michele Estrada (his wife is a Nurse Practitioner) about goals of care.  They would likely not opt for tube feedings or other heroic measures.  He amiable about discussing Hospice care.       Hospital Course:  WBC 32K >> 23K.  Creatinine 2.5  >> 1.9.  Case Management confirmed Hospice consult with son.  Seen by Psychiatry during January 2018 admission who recommended labs and medication changes not done in [employer] system. Son states she was seen by Psych at the NH just 2-3 weeks ago who changed her medication.   AM labs pending.      Interval History: Pt more talkative today. States wants some water. Updated son by telephone.     Review of Systems   Unable to perform ROS: Dementia     Objective:     Vital Signs (Most Recent):  Temp: 96.4 °F (35.8 °C) (03/21/18 0403)  Pulse: 78 (03/21/18 0403)  Resp: 16 (03/21/18 0403)  BP: (!) 107/53 (03/21/18 0403)  SpO2: 98 % (03/21/18 0610) Vital Signs (24h Range):  Temp:  [96.4 °F (35.8 °C)-97.8 °F (36.6 °C)] 96.4 °F (35.8 °C)  Pulse:  [47-91] 78  Resp:  [16-20] 16  SpO2:  [97 %-99 %] 98 %  BP: (107-131)/(52-62) 107/53     Weight: 63 kg (139 lb)  Body mass index is 21.13 kg/m².    Intake/Output Summary (Last 24 hours) at 03/21/18 0829  Last data filed at 03/20/18 1900   Gross per 24 hour   Intake                0 ml   Output                0 ml   Net                0 ml      Physical Exam   Constitutional: No distress.   Cardiovascular: Normal rate and regular rhythm.    Pulmonary/Chest: Effort normal and breath sounds normal. No respiratory distress. She has no wheezes.   Abdominal: Soft. There is no tenderness.   Musculoskeletal: She exhibits no edema.   Neurological: She is alert.   More alert today. Able to tell me the name of her son and daughter.    Psychiatric: She has a normal mood and affect.   Nursing note and vitals reviewed.      Significant Labs: AM labs pending    Significant Imaging: no new    Assessment/Plan:      * SYBIL (acute kidney injury)    Fluid Volume Depletion  Cr 2.5 > 1.9 with IVF.  Pt refusing food or drink for past week but more receptive today. Son requests Hospice at nursing home.  Avoid nephrotoxins and renally dose medications. Monitor until hospice arranged.           Leukocytosis     WBC 32K >> 23K. Suspect from dehydration and UTI.  Afebrile. Monitor. Continue IVF.           Major depressive disorder, recurrent episode    Continue home escitalopram if able to take PO.  Seen by Psychiatry 1/22/18 during last hospitalization who recommended labs and medication changes.  I ordered labs for this morning which are pending.  Son states she was seen by Psychiatry 2-3 weeks ago at the NH and medication was changed.              Dementia without behavioral disturbance    Continue home donepezil 5 mg daily if able to take PO safely.         Essential hypertension    H/O CVA (without residual effects)  -131. Holding home metoprolol and lisinopril while not eating or drinking.          Hyperlipidemia    D/C home atorvastatin 20 mg daily as not taking PO.             Anemia of chronic disease    Appears baseline hgb is ~11.  Hgb 10.2. No visible, active bleeding. Monitor.          DNR (do not resuscitate)    Nursing Home Resident  Adult Failure to Thrive  Buttocks Decubiti Stage 2 and 3  From [facility] since January 2018. Patient stopped eating and drinking approx 1 week ago. Requesting water today.  Patient's son requesting Hospice Consult for NH. Seen by PT/OT who note patient appropriate for residential care only.  ST recommending NPO while patient refusing to swallow.  Turn every 2 hours with Decubiti care per nursing protocol.                VTE Risk Mitigation         Ordered     enoxaparin injection 30 mg  Daily     Route:  Subcutaneous        03/20/18 1802     IP VTE HIGH RISK PATIENT  Once      03/19/18 2317     Place VENUS hose  Until discontinued      03/19/18 2317     Place sequential compression device  Until discontinued      03/19/18 2317              Rekha Maya PA-C  Department of Hospital Medicine   [facility]  Pager: [phone]

## 2018-03-21 NOTE — CHAPLAIN
Patient was asleep when I visited with niece.  Clover spoke of losing her  recently to a cardiac arrest.  It seems his doctors kept treating his for GI instead of Cardio when he c/o chest pains.  He was a . Clover was wearing his  Jacket.  She showed compassion to her aunt-in-law and said she would want anointing for the sick if possible. I offered grief support to niesther.

## 2018-03-21 NOTE — ASSESSMENT & PLAN NOTE
Fluid Volume Depletion  Cr 2.5 > 1.9 with IVF.  Pt refusing food or drink for past week but more receptive today. Son requests Hospice at nursing home.  Avoid nephrotoxins and renally dose medications. Monitor until hospice arranged.

## 2018-03-21 NOTE — PLAN OF CARE
Per Liz at [facility], [facility] [place] sent [facility] a consult packet to [phone], expecting family from out of town to arrive around 1pm today.       03/21/18 0922   Discharge Reassessment   Assessment Type Discharge Planning Reassessment   Discharge Plan A Return to nursing home;Hospice/home

## 2018-03-21 NOTE — NURSING
Patient had  an unevenful night. Patient is only oriented to self. 0.9 Normal Saline infusing at 125 cc/hr. Patient currently NPO, oral care provided. Nurse changed dressings to wounds, patient turned Q2.  Fall precautions maintained.  Bed locked and low, side rails X3, room near nurses station. Will continue monitoring.

## 2018-03-21 NOTE — ASSESSMENT & PLAN NOTE
Nursing Home Resident  Adult Failure to Thrive  Buttocks Decubiti Stage 2 and 3  From [facility] since January 2018. Patient stopped eating and drinking approx 1 week ago. Requesting water today.  Patient's son requesting Hospice Consult for NH. Seen by PT/OT who note patient appropriate for [facility] care only.  ST recommending NPO while patient refusing to swallow.  Turn every 2 hours with Decubiti care per nursing protocol.

## 2018-03-21 NOTE — SUBJECTIVE & OBJECTIVE
Interval History: Pt more talkative today. States wants some water. Updated son by telephone.     Review of Systems   Unable to perform ROS: Dementia     Objective:     Vital Signs (Most Recent):  Temp: 96.4 °F (35.8 °C) (03/21/18 0403)  Pulse: 78 (03/21/18 0403)  Resp: 16 (03/21/18 0403)  BP: (!) 107/53 (03/21/18 0403)  SpO2: 98 % (03/21/18 0610) Vital Signs (24h Range):  Temp:  [96.4 °F (35.8 °C)-97.8 °F (36.6 °C)] 96.4 °F (35.8 °C)  Pulse:  [47-91] 78  Resp:  [16-20] 16  SpO2:  [97 %-99 %] 98 %  BP: (107-131)/(52-62) 107/53     Weight: 63 kg (139 lb)  Body mass index is 21.13 kg/m².    Intake/Output Summary (Last 24 hours) at 03/21/18 0829  Last data filed at 03/20/18 1900   Gross per 24 hour   Intake                0 ml   Output                0 ml   Net                0 ml      Physical Exam   Constitutional: No distress.   Cardiovascular: Normal rate and regular rhythm.    Pulmonary/Chest: Effort normal and breath sounds normal. No respiratory distress. She has no wheezes.   Abdominal: Soft. There is no tenderness.   Musculoskeletal: She exhibits no edema.   Neurological: She is alert.   More alert today. Able to tell me the name of her son and daughter.    Psychiatric: She has a normal mood and affect.   Nursing note and vitals reviewed.      Significant Labs: AM labs pending    Significant Imaging: no new

## 2018-03-22 VITALS
OXYGEN SATURATION: 99 % | DIASTOLIC BLOOD PRESSURE: 64 MMHG | TEMPERATURE: 98 F | HEIGHT: 68 IN | SYSTOLIC BLOOD PRESSURE: 135 MMHG | BODY MASS INDEX: 21.07 KG/M2 | RESPIRATION RATE: 20 BRPM | HEART RATE: 81 BPM | WEIGHT: 139 LBS

## 2018-03-22 PROBLEM — E86.9 FLUID VOLUME DEPLETION: Status: RESOLVED | Noted: 2018-03-19 | Resolved: 2018-03-22

## 2018-03-22 PROBLEM — E55.9 VITAMIN D DEFICIENCY: Status: ACTIVE | Noted: 2018-03-22

## 2018-03-22 PROBLEM — N30.01 ACUTE CYSTITIS WITH HEMATURIA: Status: ACTIVE | Noted: 2017-12-26

## 2018-03-22 LAB — BACTERIA UR CULT: NORMAL

## 2018-03-22 PROCEDURE — 63600175 PHARM REV CODE 636 W HCPCS: Performed by: PHYSICIAN ASSISTANT

## 2018-03-22 PROCEDURE — 92526 ORAL FUNCTION THERAPY: CPT

## 2018-03-22 PROCEDURE — 25000003 PHARM REV CODE 250: Performed by: PHYSICIAN ASSISTANT

## 2018-03-22 RX ORDER — ERTAPENEM 1 G/1
1 INJECTION, POWDER, LYOPHILIZED, FOR SOLUTION INTRAMUSCULAR; INTRAVENOUS
Status: DISCONTINUED | OUTPATIENT
Start: 2018-03-22 | End: 2018-03-22 | Stop reason: HOSPADM

## 2018-03-22 RX ORDER — LIDOCAINE HYDROCHLORIDE 10 MG/ML
3.2 INJECTION, SOLUTION EPIDURAL; INFILTRATION; INTRACAUDAL; PERINEURAL ONCE
Status: COMPLETED | OUTPATIENT
Start: 2018-03-22 | End: 2018-03-22

## 2018-03-22 RX ORDER — ACETAMINOPHEN 650 MG/1
650 SUPPOSITORY RECTAL EVERY 6 HOURS PRN
Refills: 0 | COMMUNITY
Start: 2018-03-22

## 2018-03-22 RX ADMIN — ERTAPENEM SODIUM 1 G: 1 INJECTION, POWDER, LYOPHILIZED, FOR SOLUTION INTRAMUSCULAR; INTRAVENOUS at 01:03

## 2018-03-22 RX ADMIN — LIDOCAINE HYDROCHLORIDE 32 MG: 10 INJECTION, SOLUTION EPIDURAL; INFILTRATION; INTRACAUDAL; PERINEURAL at 01:03

## 2018-03-22 NOTE — ASSESSMENT & PLAN NOTE
D/C'ed home escitalopram as patient refusing solids. Son states that patient changed after her first surgery for right hip fracture.  She needed a second surgery when the hip broke again during Rehab and was placed on strict non-weight-bearing restrictions.  Seen by Psychiatry 1/22/18 during last hospitalization who recommended labs and medication changes.  No abnormal labs to explain behavior. Son states she was seen by Psychiatry 2-3 weeks ago at the NH and medication was changed.

## 2018-03-22 NOTE — ASSESSMENT & PLAN NOTE
Nursing Home Resident  Adult Failure to Thrive  Buttocks Decubiti Stage 2 and 3  From [facility] since January 2018. Patient stopped eating and drinking except for small sips of water.  Patient's son requesting Hospice Consult for NH. Seen by PT/OT who note patient appropriate for care home care only.  ST recommending NPO while patient refusing to swallow except for pleasure sips of water.  Turn every 2 hours with Decubiti care per nursing protocol.

## 2018-03-22 NOTE — CHAPLAIN
Family requested  visit via Rekha.  Patient is DNR and discharging to Hospice today.  She was asleep when I visited but can be awakened for  anointing.  I called for  at [facility].

## 2018-03-22 NOTE — DISCHARGE SUMMARY
[facility] Medicine  Discharge Summary      Patient Name: Oksana Estrada  MRN: [id number]  Admission Date: 3/19/2018  Hospital Length of Stay: 3 days  Discharge Date and Time:  03/22/2018 11:02 AM  Attending Physician: Smith Conteh MD   Discharging Provider: Rekha Maya PA-C  Primary Care Provider: José Ramos MD      HPI:   Oksana Estrada is a 87 year old female with dementia, cognitive communication deficit, hypertension, hyperlipidemia, anemia of chronic disease, CVA without residual effects, overactive bladder, urinary retention, depression, and Right femur fracture.  She is a resident of [facility].  She has pressure sores and contractures. She is DNR.      She was brought to [facility] ED on 3/19/2018 from [facility] for failure to thrive. Pt was placed in [facility] in January 2018 after inability to rehab due to a hip fracture in December 2017. Son states that patient changed after her first surgery for right hip fracture.  She needed a second surgery when the hip broke again during Rehab and was placed on strict non-weight-bearing restrictions.  A few days ago, she learned that she would not be returning home, and she began refusing PO intake. She denies any pain except for some right hip pain.  All of her fingers tips are cool and cyanotic. +1 radial pulses. Her mouth and skin is dry and pale.  ED workup revealed WBC 32, PLTS and Hgb/Hct above baseline, Sodium 138, CO2 14,  (up from 15), Cr 2.5 (up from 0.8), Alk Phos 170, AST 64, ALT 48.  Her urine shows UTI with WBC > 100.  She is severely volume depleted.  She was treated with Ciprofloxacin, as she has an allergy to penicillin, and was given 1.5 liters of IV Normal Saline.  She was admitted to [facility] Medicine for Acute Kidney Injury.    Upon interview, patient is frail and withdrawn.  She denies feeling sad.  I spoke with patient's son, Michele Estrada (his  wife is a Nurse Practitioner) about goals of care.  They are amiable about discussing Hospice care.       * No surgery found *      Hospital Course:   WBC 32K >> 23K >> 15K.   Creatinine 2.5 >> 1.9 >> 1.1. Spoke with son, Michele, in patient's room who confirms interest in Hospice.  Pt continues to refuse food and drink except for small sips of water and bites of pureed food as she requests.  Seen by Psychiatry during January 2018 admission who recommended labs and medication changes. No labs to explain change in mental status. Son states she was seen by Psych at the NH just 2-3 weeks ago who changed her medication.  Discharging with hospice at nursing home to focus on palliative care.           Consults:   Consults         Status Ordering Provider     Inpatient consult to Social Work  Once     Provider:  (Not yet assigned)    Acknowledged EDDI GALEANO        Physical Exam   Constitutional: No distress.   Cardiovascular: Normal rate and regular rhythm.    Pulmonary/Chest: Effort normal and breath sounds normal. No respiratory distress. She has no wheezes.   Abdominal: Soft. There is no tenderness.   Neurological: She is alert and oriented today.  States just wants to be comfortable.    Psychiatric: She has a normal mood and affect.   Nursing note and vitals reviewed.      * Acute cystitis with hematuria    Likely 2/2 dehydration from refusal of food and drink at nursing home.  WBC > 100, many bacteria and 3+ leukocytes. Presumptive E. Coli on urine culture which is same organism as on 12/24/17 which was pan-sensitive.  Received renally-dosed Ciprofloxacin IV x 3 days.            Leukocytosis    WBC 32K >> 23K >> 15K. Suspect from dehydration and UTI.  Afebrile. D/C'ed IVF on 3/22. Completed 3 day course of abx for UTI.            SYBIL (acute kidney injury)    Fluid Volume Depletion  Cr 2.5 > 1.9 > 1.1 with IVF which was decreased to 50 cc/hour on 3/21 and d/c'ed on 3/22. BUN still elevated at 66.  Pt refusing  food or drink for past week except small sips of water and pureed food at her request. Son requests Hospice at nursing home.          Major depressive disorder, recurrent episode    D/C'ed home escitalopram as patient refusing solids. Son states that patient changed after her first surgery for right hip fracture.  She needed a second surgery when the hip broke again during Rehab and was placed on strict non-weight-bearing restrictions.  Seen by Psychiatry 1/22/18 during last hospitalization who recommended labs and medication changes.  No abnormal labs to explain behavior. Son states she was seen by Psychiatry 2-3 weeks ago at the NH and medication was changed.              Dementia without behavioral disturbance    D/C'ed home donepezil 5 mg daily as patient refusing solids.          Essential hypertension    H/O CVA (without residual effects)  -146. D/C'ed home metoprolol and lisinopril as refusing solids.  May be added back if needed.          Hyperlipidemia    D/C home atorvastatin 20 mg daily as not taking PO and not needed for patient in Hospice.             Anemia of chronic disease    Appears baseline hgb is ~11.  Hgb 8.7 on 3/21 likely dilutional and poor PO intake. No visible, active bleeding.           DNR (do not resuscitate)    Nursing Home Resident  Adult Failure to Thrive  Buttocks Decubiti Stage 2 and 3  From [facility] since January 2018. Patient stopped eating and drinking except for small sips of water.  Patient's son requesting Hospice Consult for NH. Seen by PT/OT who note patient appropriate for shelter care only.  ST recommending NPO while patient refusing to swallow except for pleasure sips of water.  Turn every 2 hours with Decubiti care per nursing protocol.              Vitamin D deficiency    Vitamin D level 24 on 3/21/2018. No PO replacement as patient not accepting solids consistently.             Final Active Diagnoses:    Diagnosis Date Noted POA    PRINCIPAL PROBLEM:   Acute cystitis with hematuria [N30.01] 12/26/2017 Yes    Leukocytosis [D72.829] 03/20/2018 Yes    SYBIL (acute kidney injury) [N17.9] 03/19/2018 Yes    Major depressive disorder, recurrent episode [F33.9] 01/19/2018 Yes    Dementia without behavioral disturbance [F03.90] 09/25/2017 Yes     Chronic    Essential hypertension [I10] 12/20/2017 Yes     Chronic    Hyperlipidemia [E78.5] 12/20/2017 Yes     Chronic    Anemia of chronic disease [D63.8] 12/20/2017 Yes     Chronic    DNR (do not resuscitate) [Z66] 03/19/2018 Yes    Vitamin D deficiency [E55.9] 03/22/2018 Yes    Failure to thrive in adult [R62.7] 03/19/2018 Yes    Nursing home resident [Z59.3] 03/19/2018 Not Applicable    Decubitus ulcer of buttock, stage 2 [L89.302] 03/19/2018 Yes    History of CVA (cerebrovascular accident) [Z86.73] 10/19/2017 Not Applicable     Chronic      Problems Resolved During this Admission:    Diagnosis Date Noted Date Resolved POA    Fluid volume depletion [E86.9] 03/19/2018 03/22/2018 Yes       Discharged Condition: stable with poor prognosis     Disposition: Hospice/Home    Follow Up:  Follow-up Information     José Ramos MD.    Specialty:  Internal Medicine  Contact information:  [address] [place] [id number] [phone]             [facility].               Patient Instructions:     Activity as tolerated         Significant Diagnostic Studies: Labs:   BMP:   Recent Labs  Lab 03/21/18  0817   GLU 97   *   K 3.4*   *   CO2 15*   BUN 66*   CREATININE 1.1   CALCIUM 8.5*   MG 1.8    and CBC   Recent Labs  Lab 03/21/18  0817   WBC 15.40*   HGB 8.7*   HCT 27.8*   *     Microbiology Results (last 7 days)     Procedure Component Value Units Date/Time    Blood culture [[id number]] Collected:  03/19/18 2141    Order Status:  Completed Specimen:  Blood from Peripheral, Hand, Right Updated:  03/22/18 0822     Blood Culture, Routine No Growth to date     Blood Culture, Routine No  Growth to date     Blood Culture, Routine No Growth to date    Blood Culture #1 **CANNOT BE ORDERED STAT** [[id number]] Collected:  03/19/18 2130    Order Status:  Completed Specimen:  Blood from Peripheral, Antecubital, Left Updated:  03/22/18 0822     Blood Culture, Routine No Growth to date     Blood Culture, Routine No Growth to date     Blood Culture, Routine No Growth to date    Urine culture [[id number]] Collected:  03/19/18 1856    Order Status:  Completed Specimen:  Urine from Clean Catch Updated:  03/21/18 0826     Urine Culture, Routine --     PRESUMPTIVE E COLI  >100,000 cfu/ml  Identification and susceptibility pending       Urine Culture, Routine --     GRAM NEGATIVE EDGARD, NON-LACTOSE   > 100,000 cfu/ml  Identification and susceptibility pending      Urine culture [[id number]]     Order Status:  Completed Specimen:  Urine from Urine, Catheterized         Imaging Results    None           Pending Diagnostic Studies:     Procedure Component Value Units Date/Time    Vitamin B1 [[id number]] Collected:  03/21/18 0816    Order Status:  Sent Lab Status:  In process Updated:  03/21/18 1342    Specimen:  Blood from Blood          Medications:  Reconciled Home Medications:   Current Discharge Medication List      START taking these medications    Details   acetaminophen (TYLENOL) 650 MG Supp Place 1 suppository (650 mg total) rectally every 6 (six) hours as needed (101).  Refills: 0    Associated Diagnoses: Urinary tract infection without hematuria, site unspecified         STOP taking these medications       alendronate (FOSAMAX) 70 MG tablet Comments:   Reason for Stopping:         ascorbic acid, vitamin C, (VITAMIN C) 1000 MG tablet Comments:   Reason for Stopping:         atorvastatin (LIPITOR) 20 MG tablet Comments:   Reason for Stopping:         chlordiazepoxide-clidinium 5-2.5 mg (LIBRAX) 5-2.5 mg Cap Comments:   Reason for Stopping:         cholestyramine-aspartame (QUESTRAN LIGHT) 4 gram PwPk  Comments:   Reason for Stopping:         donepezil (ARICEPT) 5 MG tablet Comments:   Reason for Stopping:         escitalopram oxalate (LEXAPRO) 20 MG tablet Comments:   Reason for Stopping:         lisinopril (PRINIVIL,ZESTRIL) 20 MG tablet Comments:   Reason for Stopping:         metoprolol succinate (TOPROL-XL) 50 MG 24 hr tablet Comments:   Reason for Stopping:         multivitamin (THERAGRAN) tablet Comments:   Reason for Stopping:         potassium chloride (MICRO-K) 10 MEQ CpSR Comments:   Reason for Stopping:         tolterodine (DETROL) 1 MG Tab Comments:   Reason for Stopping:         traMADol (ULTRAM) 50 mg tablet Comments:   Reason for Stopping:         quetiapine (SEROQUEL) 25 MG Tab Comments:   Reason for Stopping:               Indwelling Lines/Drains at time of discharge:   Lines/Drains/Airways     Pressure Ulcer                 Pressure Injury 03/19/18 2331 Coccyx Unstageable 2 days         Pressure Injury 03/19/18 2331 Right Heel Unstageable 2 days         Pressure Injury 03/19/18 2333 Left Heel Unstageable 2 days                Time spent on the discharge of patient: 60 minutes  Patient was seen and examined on the date of discharge and determined to be suitable for discharge.         Rekha Maya PA-C  Department of Hospital Medicine  [facility]  Pager: [phone]

## 2018-03-22 NOTE — ASSESSMENT & PLAN NOTE
Vitamin D level 24 on 3/21/2018. No PO replacement as patient not accepting solids consistently.

## 2018-03-22 NOTE — PLAN OF CARE
Problem: Patient Care Overview  Goal: Plan of Care Review  Outcome: Ongoing (interventions implemented as appropriate)   03/22/18 2162   Coping/Psychosocial   Plan Of Care Reviewed With patient   Care plan reviewed with Pt verbalized plan of care. AAOx1, to her name, family at bedside, no respiratory distress noted, on room air. Denies any pain of discomfort, education provided on medication effect and side effect, voices understanding. Discharge instructions provided to patient, voices understanding. Peripheral line discontinued, tip intact, pressure applied. Safety measures maintained, call light within her reach, no apparent distress noted, bed in low position, bed alarm on, educated on the importance of calling as needed, voices understanding, stable at this time. Report given to nurse Smith at [facility], all questions answered, patient awaiting on transport for discharge.

## 2018-03-22 NOTE — ASSESSMENT & PLAN NOTE
Fluid Volume Depletion  Cr 2.5 > 1.9 > 1.1 with IVF which was decreased to 50 cc/hour on 3/21 and d/c'ed on 3/22. BUN still elevated at 66.  Pt refusing food or drink for past week except small sips of water and pureed food at her request. Son requests Hospice at nursing home.

## 2018-03-22 NOTE — ASSESSMENT & PLAN NOTE
WBC 32K >> 23K >> 15K. Suspect from dehydration and UTI.  Afebrile. D/C'ed IVF on 3/22. Completed 3 day course of abx for UTI.

## 2018-03-22 NOTE — PLAN OF CARE
Per Liz at [facility], patient is clear to arrive and ok to arrange for transport, patient is unsafe to travel in seated position and has decubitus ulcers to sacrum, TN notified patient's son and family members in room of DC expected for 1pm to 1:30pm.    TN notified Amira vicente to call report.       03/22/18 1201   Discharge Reassessment   Assessment Type Final Discharge Note   Discharge Plan A Return to nursing home;Hospice/home

## 2018-03-22 NOTE — PLAN OF CARE
Per Liz at [facility], patient is clear to arrive and ok to arrange for transport, patient is unsafe to travel in seated position and has decubitus ulcers to sacrum, TN notified patient's son and family members in room of MO expected for 1pm to 1:30pm.     TN notified Amira vicente to call report.        03/22/18 9588   Final Note   Assessment Type Final Discharge Note   Discharge Disposition HospiceMedic   Right Care Referral Info   Post Acute Recommendation Other

## 2018-03-22 NOTE — ASSESSMENT & PLAN NOTE
Appears baseline hgb is ~11.  Hgb 8.7 on 3/21 likely dilutional and poor PO intake. No visible, active bleeding.

## 2018-03-22 NOTE — ASSESSMENT & PLAN NOTE
Likely 2/2 dehydration from refusal of food and drink at nursing home.  WBC > 100, many bacteria and 3+ leukocytes. Presumptive E. Coli on urine culture which is same organism as on 12/24/17 which was pan-sensitive.  Received renally-dosed Ciprofloxacin IV x 3 days.

## 2018-03-22 NOTE — PLAN OF CARE
Problem: Patient Care Overview  Goal: Plan of Care Review  Outcome: Ongoing (interventions implemented as appropriate)  Plan of care reviewed with patient. Patient verbalized complete understanding. Fall/safety precautions maintained. Slip resistant socks on. Bed in lowest position, locked, call light within reach. Bed alarm, Side rails up x's 2. Nurse instructed patient to notify staff for any assistance and pt verbalized complete understanding. Pt turned Q2h to prevent skin breakdown. Pt orientated only to person, reorientation strategies implemented. Pt received IV antibiotics. Pt on continuous NS running at 50 ml/hr. Oral care provided, teeth cleansed and lips lubricated. Mepilex to sacrum changed, CDI. Pt resting comfortably throughout shift. No acute distress noted, will continue to monitor. Pt not on telemetry.

## 2018-03-22 NOTE — ASSESSMENT & PLAN NOTE
H/O CVA (without residual effects)  -146. D/C'ed home metoprolol and lisinopril as refusing solids.  May be added back if needed.

## 2018-03-22 NOTE — PROGRESS NOTES
TN sent Hospice orders to [facility] via [employer] and by [facility] to [facility], expect dc today.

## 2018-03-22 NOTE — PT/OT/SLP PROGRESS
"Speech Language Pathology Treatment    Patient Name:  Oksana Estrada   MRN:  [id number]  Admitting Diagnosis: Acute cystitis with hematuria    Recommendations:     Diet recommendations:  Puree, Liquid Diet Level: Thin   Aspiration Precautions: upright for meals, total assist for feeding, small bites/sips   General Precautions: Standard, fall, pureed diet  Communication strategies:  provide increased time to answer    Subjective     Pt seen at bedside for repeat swallow eval.   Patient goals: "How are you?"     Pain/Comfort:  · Pain Rating 1: 0/10    Objective:     Has the patient been evaluated by SLP for swallowing?   Yes  Keep patient NPO? No   Current Respiratory Status: room air      Pt found in room, paged by PA about swallow re-eval. Pt is now alert, talkative and more responsive. Pt asking about PO trials.  Pt administered water by tsp and applesauce by tsp, 1/4 bite of cracker. Increased mastication and oral transit time noted with cracker, pt tolerated water and applesauce with minimal oral delay present.   REC; pureed and thin liquid diet for pleasure and comfort means. Pt still to DC with hospice. PA notified     Assessment:     Oksana Estrada is a 87 y.o. female with an SLP diagnosis of increased alertness, asking for PO, will initiate pleasure feed for now.    Goals:    SLP Goals        Problem: SLP Goal    Goal Priority Disciplines Outcome   SLP Goal     SLP Ongoing (interventions implemented as appropriate)   Description:  Short Term Goals:  1. Pt will participate in ongoing swallow assessment to determine least restrictive diet.                       Plan:     · Patient to be seen:  2 x/week   · Plan of Care expires:  04/09/18  · Plan of Care reviewed with:  patient (PA)   · SLP Follow-Up:  No       Discharge recommendations:  hospice facility     Time Tracking:     SLP Treatment Date:   03/22/18  Speech Start Time:  0940  Speech Stop Time:  0950     Speech Total Time (min):  10 " min    Billable Minutes: Treatment Swallowing Dysfunction 10    GOPAL Galvan, CCC-SLP  03/22/2018

## 2018-03-22 NOTE — PLAN OF CARE
Patient discharged on stable conditions via stretcher accompanied by 2 ambulance personnel, all belongings with patient, no apparent distress noted, denies any pain or discomfort.

## 2018-03-23 LAB — VIT B1 SERPL-MCNC: 58 UG/L (ref 38–122)

## 2018-03-25 LAB
BACTERIA BLD CULT: NORMAL
BACTERIA BLD CULT: NORMAL

## 2019-01-07 NOTE — PT/OT/SLP DISCHARGE
Occupational Therapy Discharge Summary    Oksana Estrada  MRN: [id number]   Principal Problem: History of subdural hematoma      Patient Discharged from acute Occupational Therapy on 2/9/2017.  Please refer to prior OT note for functional status.    Assessment:      Patient appropriate for care in another setting.    Objective:     GOALS:   Multidisciplinary Problems     Occupational Therapy Goals        Problem: Occupational Therapy Goal    Goal Priority Disciplines Outcome Interventions   Occupational Therapy Goal     OT, PT/OT     Description:  Goals to be met by: 2/18/2017    Patient will increase functional independence with ADLs by performing:    Grooming while seated in w/c with Minimal Assistance.  Toileting from bedside commode with Moderate Assistance for hygiene and clothing management.   Bathing from  sitting at sink in w/c with Moderate Assistance.  Toilet transfer to bedside commode with Minimal Assistance to reduce the need for physical assist during transfers.                    Reasons for Discontinuation of Therapy Services  Transfer to alternate level of care.      Plan:     Patient Discharged to: son's Freeman    GLO Munoz  1/7/2019   14-Nov-2017 21:23

## 2019-08-28 NOTE — ANESTHESIA PREPROCEDURE EVALUATION
01/19/2018  Oksana Estrada is a 87 y.o., female.    Anesthesia Evaluation    I have reviewed the Patient Summary Reports.     I have reviewed the Medications.     Review of Systems  Anesthesia Hx:  No problems with previous Anesthesia   Denies Personal Hx of Anesthesia complications.   Social:  Former Smoker, No Alcohol Use    Hematology/Oncology:  Hematology Normal   Oncology Normal     EENT/Dental:EENT/Dental Normal   Cardiovascular:   Hypertension Echo 10/19/17:  CONCLUSIONS     1 - Low normal to mildly depressed left ventricular systolic function (EF 50-55%).     2 - No diagnostic regional wall motion abnormalities.     3 - Impaired LV relaxation, elevated LAP (grade 2 diastolic dysfunction).     4 - Trivial mitral regurgitation.     5 - Trivial tricuspid regurgitation.     6 - Pulmonary hypertension. The estimated PA systolic pressure is 43 mmHg.    Pulmonary:  Pulmonary Normal    Renal/:  Renal/ Normal     Hepatic/GI:  Hepatic/GI Normal    Musculoskeletal:  Musculoskeletal Normal    Neurological:   CVA    Endocrine:  Endocrine Normal    Dermatological:  Skin Normal    Psych:   Psychiatric History          Physical Exam  General:  Well nourished    Airway/Jaw/Neck:  Airway Findings: Mouth Opening: Normal Tongue: Normal  Mallampati: II      Dental:  Dental Findings: In tact   Chest/Lungs:  Chest/Lungs Clear    Heart/Vascular:  Heart Findings: Normal Heart murmur: negative       Mental Status:  Mental Status Findings:  Cooperative, Alert and Oriented         Anesthesia Plan  Type of Anesthesia, risks & benefits discussed:  Anesthesia Type:  general, regional  Patient's Preference:   Intra-op Monitoring Plan: standard ASA monitors  Intra-op Monitoring Plan Comments:   Post Op Pain Control Plan: multimodal analgesia, IV/PO Opioids PRN and per primary service following discharge from PACU  Post Op  Pain Control Plan Comments:   Induction:   IV  Beta Blocker:  Patient is on a Beta-Blocker and has received one dose within the past 24 hours (No further documentation required).       Informed Consent: Patient representative understands risks and agrees with Anesthesia plan.  Questions answered. Anesthesia consent signed with patient representative.  ASA Score: 3     Day of Surgery Review of History & Physical:    H&P update referred to the surgeon.     Anesthesia Plan Notes: Pt's son at bedside, discussed options for anesthesia, I.e spinal vs GETA.  R/b/a discussed, plan for spinal with GA backup        Ready For Surgery From Anesthesia Perspective.        pt was unresponsive with slurred speech.

## 2021-01-21 NOTE — ASSESSMENT & PLAN NOTE
"Patient had a fall and has sustained a fracture.  Plain radiograph was significant for "[a]cute comminuted fracture distal right femur just above the knee arthroplasty."  Her Revised Cardiac Risk Index suggests that she is at low risk for perioperative cardiac events.Echo on 10/2017 show preserved EF, Orthopedic Surgery for evaluation and further recommendations.S/P ORIF right femur on 12.21.17,pain control,PT,OT   " VSS. HR 64, /66, 97% RA.

## 2023-06-02 NOTE — SUBJECTIVE & OBJECTIVE
Past Medical History:   Diagnosis Date    Dementia     Depression     Hypertension     Stroke        Past Surgical History:   Procedure Laterality Date    BREAST SURGERY      lumpectomy left breast    JOINT REPLACEMENT      bilateral knee replacement       Review of patient's allergies indicates:   Allergen Reactions    Codeine     Penicillins        Current Facility-Administered Medications on File Prior to Encounter   Medication    [COMPLETED] fentaNYL injection 50 mcg    [COMPLETED] fentaNYL injection 75 mcg    [COMPLETED] sodium chloride 0.9% bolus 500 mL     Current Outpatient Prescriptions on File Prior to Encounter   Medication Sig    alendronate (FOSAMAX) 70 MG tablet Take 1 tablet (70 mg total) by mouth every 7 days.    atorvastatin (LIPITOR) 20 MG tablet Take 1 tablet (20 mg total) by mouth once daily.    chlordiazepoxide-clidinium 5-2.5 mg (LIBRAX) 5-2.5 mg Cap Take 1 capsule by mouth 3 (three) times daily as needed. For irritable bowel syndrome    chlorthalidone (HYGROTEN) 25 MG Tab Take 25 mg by mouth daily as needed.    cholestyramine-aspartame (QUESTRAN LIGHT) 4 gram PwPk Take 1 packet (4 g total) by mouth 2 (two) times daily. As needed for diarrhea    donepezil (ARICEPT) 5 MG tablet Take 1 tablet (5 mg total) by mouth every evening. For memory    escitalopram oxalate (LEXAPRO) 20 MG tablet Take 1 tablet (20 mg total) by mouth once daily.    lisinopril (PRINIVIL,ZESTRIL) 20 MG tablet Take 1 tablet (20 mg total) by mouth once daily.    metoprolol succinate (TOPROL-XL) 50 MG 24 hr tablet Take 50 mg by mouth once daily.    quetiapine (SEROQUEL) 25 MG Tab Take 1 tablet (25 mg total) by mouth every evening.    terbinafine HCl (LAMISIL) 250 mg tablet Take 250 mg by mouth once daily.    tolterodine (DETROL) 1 MG Tab Take 1 tablet (1 mg total) by mouth 2 (two) times daily.    traMADol (ULTRAM) 50 mg tablet Take 50 mg by mouth every 6 (six) hours as needed for Pain.     Family History   Received call from PCP's office, is there anyway we can get pt in at [facility] office before her 6/27 appt at [facility]     None        Social History Main Topics    Smoking status: Former Smoker     Types: Cigarettes    Smokeless tobacco: Never Used      Comment: quit 25 years ago    Alcohol use No    Drug use: No    Sexual activity: No     Review of Systems   Constitutional: Positive for activity change. Negative for appetite change, diaphoresis, fatigue and fever.   Eyes: Negative for discharge.   Respiratory: Negative for chest tightness and shortness of breath.    Cardiovascular: Negative for chest pain and leg swelling.   Gastrointestinal: Negative for abdominal distention, diarrhea, nausea and vomiting.   Genitourinary: Negative for difficulty urinating.     Objective:     Vital Signs (Most Recent):  Temp: 98 °F (36.7 °C) (01/19/18 0755)  Pulse: 61 (01/19/18 0755)  Resp: 18 (01/19/18 0755)  BP: (!) 111/53 (01/19/18 0755)  SpO2: 97 % (01/19/18 0755) Vital Signs (24h Range):  Temp:  [97.5 °F (36.4 °C)-98.7 °F (37.1 °C)] 98 °F (36.7 °C)  Pulse:  [59-85] 61  Resp:  [16-18] 18  SpO2:  [95 %-99 %] 97 %  BP: (111-132)/(53-70) 111/53     Weight: 66.1 kg (145 lb 11.6 oz)  Body mass index is 22.16 kg/m².    Physical Exam   Constitutional: She is oriented to person, place, and time. She appears well-developed and well-nourished.   HENT:   Head: Normocephalic and atraumatic.   Cardiovascular: Normal rate.  Exam reveals no gallop and no friction rub.    Pulmonary/Chest: Effort normal and breath sounds normal. No respiratory distress. She has no wheezes. She has no rales.   Abdominal: Soft. She exhibits no distension and no mass. There is no tenderness. There is no guarding.   Neurological: She is alert and oriented to person, place, and time.   Skin: Skin is warm and dry.   Psychiatric: Her behavior is normal.   Angry    Vitals reviewed.          Significant Labs:   BMP:   Recent Labs  Lab 01/18/18  1355   *   *   K 4.6   CL 98   CO2 30*   BUN 23*   CREATININE 0.96   CALCIUM 8.9     CBC:   Recent Labs  Lab 01/18/18  1355    WBC 10.98   HGB 10.1*   HCT 30.8*   *       Significant Imaging: imaging reviewed.   All labs reviewed.

## 2024-01-09 NOTE — ASSESSMENT & PLAN NOTE
Chronic. See above.   HCC coding opportunities          Chart Reviewed number of suggestions sent to Provider: 1     Patients Insurance   E11.22  Medicare Insurance: Medicare

## 2024-02-17 NOTE — PLAN OF CARE
Problem: Adult Inpatient Plan of Care  Goal: Plan of Care Review  Outcome: Ongoing, Progressing  Goal: Optimal Comfort and Wellbeing  Outcome: Ongoing, Progressing     Problem: Diabetes Comorbidity  Goal: Blood Glucose Level Within Targeted Range  Outcome: Ongoing, Progressing      Problem: Occupational Therapy Goal  Goal: Occupational Therapy Goal  Goals to be met by: 4/20/18     Patient will increase functional independence with ADLs by performing:    Feeding with Minimal Assistance.  Sitting at edge of bed x10 minutes with Moderate Assistance.  Rolling to Bilateral with Moderate Assistance.   Supine to sit with Moderate Assistance.  Increased functional strength to 3/5 for self care skills and functional mobility.  Upper extremity exercise program x10 reps per handout, with assistance as needed.    Outcome: Ongoing (interventions implemented as appropriate)  Pt would benefit from cont OT services in order to maximize functional independence. Recommendations ongoing at this time. Per NH, pt was d/c from SNF and now [facility] 2/2 pt not progressing with therapy. NH staff did report pt able to feed herself until 4 days prior to hospital admission. Will progress pt as able. Per chart, speaking about hospice

## (undated) DEVICE — BIT DRILL QC STRL SS 3.2X145

## (undated) DEVICE — SUPPORT ULNA NERVE PROTECTOR

## (undated) DEVICE — Device

## (undated) DEVICE — NDL 18GA X1 1/2 REG BEVEL

## (undated) DEVICE — STOCKING KNEE HIGH MED REGULAR

## (undated) DEVICE — GLOVE SURGICAL LATEX SZ 8

## (undated) DEVICE — BLANKET UPPER BODY 78.7X29.9IN

## (undated) DEVICE — GUIDEWIRE DRILL TIP 2.5X300

## (undated) DEVICE — DRAPE STERI-DRAPE 83X125 IOBAN

## (undated) DEVICE — CANISTER SUCTION 2 LTR

## (undated) DEVICE — PULSAVAC ZIMMER

## (undated) DEVICE — SOL 9P NACL IRR PIC IL

## (undated) DEVICE — SOL IRR NACL .9% 3000ML

## (undated) DEVICE — PAD ABD 8X10 STERILE

## (undated) DEVICE — SEE MEDLINE ITEM 157166

## (undated) DEVICE — SOL BETADINE 5%

## (undated) DEVICE — SYR 10CC LUER LOCK

## (undated) DEVICE — SHEET DRAPE FAN-FOLDED 3/4

## (undated) DEVICE — CHLORAPREP W TINT 26ML APPL

## (undated) DEVICE — DRAPE C-ARM FOR MOBILE XRAY

## (undated) DEVICE — SPONGE LAP 18X18 PREWASHED

## (undated) DEVICE — GAUZE SPONGE 4'X4 12 PLY

## (undated) DEVICE — ELECTRODE REM PLYHSV RETURN 9

## (undated) DEVICE — SEE MEDLINE ITEM 146345

## (undated) DEVICE — BANDAGE ACE ELASTIC 6"

## (undated) DEVICE — SLEEVE SCD EXPRESS CALF MEDIUM

## (undated) DEVICE — SEE MEDLINE ITEM 146292

## (undated) DEVICE — SEE MEDLINE ITEM 107746

## (undated) DEVICE — DRAPE STERI LONG

## (undated) DEVICE — BIT DRILL

## (undated) DEVICE — SUT 0 VICRYL / CT-1

## (undated) DEVICE — SEE L#120831

## (undated) DEVICE — NDL ECLIPSE SAFETY 18GX1-1/2IN

## (undated) DEVICE — STAPLER SKIN ROTATING HEAD

## (undated) DEVICE — SUT 2/0 36IN COATED VICRYL

## (undated) DEVICE — STAPLER SKIN PROXIMATE WIDE

## (undated) DEVICE — MAT QUICK 40X30 FLOOR FLUID LF

## (undated) DEVICE — LINER GLOVE POWDERFREE 8

## (undated) DEVICE — SEE MEDLINE ITEM 152622

## (undated) DEVICE — SUT CTD VICRYL 0 UND BR CT

## (undated) DEVICE — COVER OVERHEAD SURG LT BLUE

## (undated) DEVICE — SUT 1 36IN COATED VICRYL UN

## (undated) DEVICE — PADDING CAST SOFT-ROLL 6 X 4

## (undated) DEVICE — TRAY FOLEY 16FR INFECTION CONT

## (undated) DEVICE — PADDING CAST SPECIALIST 6X4YD

## (undated) DEVICE — SEE MEDLINE ITEM 157117